# Patient Record
Sex: MALE | Race: WHITE | NOT HISPANIC OR LATINO | Employment: OTHER | URBAN - METROPOLITAN AREA
[De-identification: names, ages, dates, MRNs, and addresses within clinical notes are randomized per-mention and may not be internally consistent; named-entity substitution may affect disease eponyms.]

---

## 2017-01-13 PROBLEM — Z72.0 TOBACCO USE: Status: ACTIVE | Noted: 2017-01-13

## 2017-01-13 PROBLEM — I10 HYPERTENSION: Status: ACTIVE | Noted: 2017-01-13

## 2017-01-13 PROBLEM — F41.9 ANXIETY AND DEPRESSION: Status: ACTIVE | Noted: 2017-01-13

## 2017-01-13 PROBLEM — I25.10 CAD (CORONARY ARTERY DISEASE): Status: ACTIVE | Noted: 2017-01-13

## 2017-01-13 PROBLEM — K57.90 DIVERTICULOSIS: Status: ACTIVE | Noted: 2017-01-13

## 2017-01-13 PROBLEM — G62.9 NEUROPATHY: Status: ACTIVE | Noted: 2017-01-13

## 2017-01-13 PROBLEM — F32.A ANXIETY AND DEPRESSION: Status: ACTIVE | Noted: 2017-01-13

## 2017-01-13 PROBLEM — R00.1 SYMPTOMATIC BRADYCARDIA: Status: ACTIVE | Noted: 2017-01-13

## 2017-01-13 PROBLEM — E78.5 DYSLIPIDEMIA: Status: ACTIVE | Noted: 2017-01-13

## 2017-03-07 ENCOUNTER — OFFICE VISIT (OUTPATIENT)
Dept: CARDIOLOGY | Facility: CLINIC | Age: 62
End: 2017-03-07

## 2017-03-07 VITALS
WEIGHT: 272 LBS | DIASTOLIC BLOOD PRESSURE: 80 MMHG | HEIGHT: 70 IN | SYSTOLIC BLOOD PRESSURE: 115 MMHG | HEART RATE: 75 BPM | BODY MASS INDEX: 38.94 KG/M2

## 2017-03-07 DIAGNOSIS — R00.1 SYMPTOMATIC BRADYCARDIA: ICD-10-CM

## 2017-03-07 DIAGNOSIS — I10 ESSENTIAL HYPERTENSION: ICD-10-CM

## 2017-03-07 DIAGNOSIS — E78.5 DYSLIPIDEMIA: ICD-10-CM

## 2017-03-07 DIAGNOSIS — I25.10 CORONARY ARTERY DISEASE INVOLVING NATIVE CORONARY ARTERY OF NATIVE HEART WITHOUT ANGINA PECTORIS: Primary | ICD-10-CM

## 2017-03-07 PROCEDURE — 99212 OFFICE O/P EST SF 10 MIN: CPT | Performed by: INTERNAL MEDICINE

## 2017-03-07 PROCEDURE — 93288 INTERROG EVL PM/LDLS PM IP: CPT | Performed by: INTERNAL MEDICINE

## 2017-03-07 RX ORDER — ROPINIROLE 3 MG/1
3 TABLET, FILM COATED ORAL 2 TIMES DAILY
COMMUNITY

## 2017-03-07 RX ORDER — CETIRIZINE HYDROCHLORIDE 10 MG/1
10 TABLET ORAL DAILY
COMMUNITY

## 2017-03-07 RX ORDER — ASPIRIN 325 MG
325 TABLET ORAL DAILY
COMMUNITY

## 2017-03-07 RX ORDER — FOLIC ACID 1 MG/1
1 TABLET ORAL DAILY
COMMUNITY

## 2017-03-07 RX ORDER — LISINOPRIL 10 MG/1
10 TABLET ORAL DAILY
COMMUNITY
End: 2020-04-13

## 2017-03-07 RX ORDER — TRAZODONE HYDROCHLORIDE 150 MG/1
150 TABLET ORAL NIGHTLY
COMMUNITY
End: 2017-05-11 | Stop reason: SDUPTHER

## 2017-03-07 RX ORDER — HYDRALAZINE HYDROCHLORIDE 10 MG/1
20 TABLET, FILM COATED ORAL 3 TIMES DAILY
COMMUNITY
End: 2017-05-11 | Stop reason: DRUGHIGH

## 2017-03-07 RX ORDER — MONTELUKAST SODIUM 10 MG/1
10 TABLET ORAL NIGHTLY
COMMUNITY

## 2017-03-07 RX ORDER — PROMETHAZINE HYDROCHLORIDE 25 MG/1
25 TABLET ORAL EVERY 6 HOURS PRN
COMMUNITY
End: 2017-11-14

## 2017-03-07 RX ORDER — DILTIAZEM HYDROCHLORIDE 180 MG/1
360 CAPSULE, COATED, EXTENDED RELEASE ORAL DAILY
COMMUNITY
End: 2017-05-11 | Stop reason: DRUGHIGH
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
## 2017-04-07 ENCOUNTER — TELEPHONE (OUTPATIENT)
Dept: CARDIOLOGY | Facility: CLINIC | Age: 62
End: 2017-04-07

## 2017-04-07 NOTE — TELEPHONE ENCOUNTER
Frances called for clearance for colonoscopy. Reviewed with MRVICENTE, minor, nonobstructive CAD two years ago, normal LV function. No issues for colonoscopy. Clearance faxed. AYALA

## 2017-05-11 ENCOUNTER — OFFICE VISIT (OUTPATIENT)
Dept: PSYCHIATRY | Facility: CLINIC | Age: 62
End: 2017-05-11

## 2017-05-11 VITALS
HEIGHT: 70 IN | DIASTOLIC BLOOD PRESSURE: 69 MMHG | BODY MASS INDEX: 38.65 KG/M2 | SYSTOLIC BLOOD PRESSURE: 104 MMHG | HEART RATE: 75 BPM | WEIGHT: 270 LBS

## 2017-05-11 DIAGNOSIS — F33.40 MAJOR DEPRESSIVE DISORDER, RECURRENT, IN REMISSION (HCC): Primary | ICD-10-CM

## 2017-05-11 DIAGNOSIS — F10.21 ALCOHOL DEPENDENCE IN REMISSION (HCC): ICD-10-CM

## 2017-05-11 PROCEDURE — 99213 OFFICE O/P EST LOW 20 MIN: CPT | Performed by: PSYCHIATRY & NEUROLOGY

## 2017-05-11 RX ORDER — DILTIAZEM HYDROCHLORIDE 360 MG/1
360 CAPSULE, EXTENDED RELEASE ORAL DAILY
COMMUNITY
Start: 2017-05-02 | End: 2018-04-11 | Stop reason: DRUGHIGH

## 2017-05-11 RX ORDER — SERTRALINE HYDROCHLORIDE 100 MG/1
TABLET, FILM COATED ORAL
Qty: 138 TABLET | Refills: 1 | Status: SHIPPED | OUTPATIENT
Start: 2017-05-11 | End: 2017-07-11

## 2017-05-11 RX ORDER — HYDRALAZINE HYDROCHLORIDE 25 MG/1
TABLET, FILM COATED ORAL
COMMUNITY
Start: 2017-05-02 | End: 2017-07-11

## 2017-05-11 RX ORDER — AMILORIDE HYDROCHLORIDE 5 MG/1
5 TABLET ORAL DAILY
COMMUNITY
Start: 2017-05-02 | End: 2017-08-28

## 2017-05-11 RX ORDER — PRAVASTATIN SODIUM 40 MG
40 TABLET ORAL DAILY
COMMUNITY
Start: 2017-05-02

## 2017-05-11 RX ORDER — BUMETANIDE 1 MG/1
1 TABLET ORAL 2 TIMES DAILY
COMMUNITY
Start: 2017-05-02

## 2017-05-11 RX ORDER — METOLAZONE 2.5 MG/1
2.5 TABLET ORAL DAILY
COMMUNITY
End: 2017-07-11

## 2017-05-11 RX ORDER — SERTRALINE HYDROCHLORIDE 100 MG/1
TABLET, FILM COATED ORAL
COMMUNITY
Start: 2017-05-02 | End: 2017-05-11 | Stop reason: SDUPTHER

## 2017-05-11 RX ORDER — CYCLOBENZAPRINE HCL 10 MG
TABLET ORAL
COMMUNITY
Start: 2017-05-02 | End: 2017-05-11

## 2017-05-11 RX ORDER — TRAZODONE HYDROCHLORIDE 150 MG/1
150 TABLET ORAL NIGHTLY
Qty: 90 TABLET | Refills: 1 | Status: SHIPPED | OUTPATIENT
Start: 2017-05-11 | End: 2017-07-31 | Stop reason: SDUPTHER

## 2017-05-26 ENCOUNTER — TELEPHONE (OUTPATIENT)
Dept: CARDIOLOGY | Facility: CLINIC | Age: 62
End: 2017-05-26

## 2017-07-11 ENCOUNTER — OFFICE VISIT (OUTPATIENT)
Dept: CARDIOLOGY | Facility: CLINIC | Age: 62
End: 2017-07-11

## 2017-07-11 VITALS
DIASTOLIC BLOOD PRESSURE: 76 MMHG | WEIGHT: 272.2 LBS | BODY MASS INDEX: 38.97 KG/M2 | SYSTOLIC BLOOD PRESSURE: 115 MMHG | HEIGHT: 70 IN | HEART RATE: 76 BPM

## 2017-07-11 DIAGNOSIS — R00.2 PALPITATIONS: Primary | ICD-10-CM

## 2017-07-11 DIAGNOSIS — R00.1 SYMPTOMATIC BRADYCARDIA: ICD-10-CM

## 2017-07-11 PROCEDURE — 93280 PM DEVICE PROGR EVAL DUAL: CPT | Performed by: INTERNAL MEDICINE
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
## 2017-07-24 ENCOUNTER — TELEPHONE (OUTPATIENT)
Dept: CARDIOLOGY | Facility: CLINIC | Age: 62
End: 2017-07-24

## 2017-07-24 NOTE — TELEPHONE ENCOUNTER
"Spoke with patient and palpitations and weakness are \"no better\" since PPM changes made at last two visits with [facility]. Pt needs followup with EP to evaluate further. Chawla implanted in 2003 and Nicole did gen change in 2007. Message with Lore to schedule with EP ASAP. Pt aware. KH  "

## 2017-07-31 ENCOUNTER — OFFICE VISIT (OUTPATIENT)
Dept: PSYCHIATRY | Facility: CLINIC | Age: 62
End: 2017-07-31

## 2017-07-31 VITALS
WEIGHT: 273 LBS | BODY MASS INDEX: 39.08 KG/M2 | HEART RATE: 69 BPM | HEIGHT: 70 IN | SYSTOLIC BLOOD PRESSURE: 144 MMHG | DIASTOLIC BLOOD PRESSURE: 90 MMHG

## 2017-07-31 DIAGNOSIS — F33.1 MAJOR DEPRESSIVE DISORDER, RECURRENT EPISODE, MODERATE (HCC): Primary | ICD-10-CM

## 2017-07-31 DIAGNOSIS — F10.21 ALCOHOL DEPENDENCE IN REMISSION (HCC): ICD-10-CM

## 2017-07-31 PROCEDURE — 99213 OFFICE O/P EST LOW 20 MIN: CPT | Performed by: PSYCHIATRY & NEUROLOGY

## 2017-07-31 RX ORDER — HYDRALAZINE HYDROCHLORIDE 25 MG/1
TABLET, FILM COATED ORAL
COMMUNITY
Start: 2017-07-24 | End: 2017-08-28

## 2017-07-31 RX ORDER — EPLERENONE 50 MG/1
TABLET, FILM COATED ORAL
COMMUNITY
Start: 2017-07-24 | End: 2017-08-28

## 2017-07-31 RX ORDER — DULOXETIN HYDROCHLORIDE 60 MG/1
CAPSULE, DELAYED RELEASE ORAL
COMMUNITY
Start: 2017-07-24 | End: 2017-08-28

## 2017-07-31 RX ORDER — SERTRALINE HYDROCHLORIDE 100 MG/1
TABLET, FILM COATED ORAL
COMMUNITY
Start: 2017-07-24 | End: 2017-08-28

## 2017-07-31 RX ORDER — ALPRAZOLAM 0.5 MG/1
TABLET, EXTENDED RELEASE ORAL
Qty: 60 TABLET | Refills: 0 | Status: SHIPPED | OUTPATIENT
Start: 2017-07-31 | End: 2017-08-28

## 2017-07-31 RX ORDER — TRAZODONE HYDROCHLORIDE 150 MG/1
150 TABLET ORAL NIGHTLY
Qty: 90 TABLET | Refills: 0 | Status: SHIPPED | OUTPATIENT
Start: 2017-07-31 | End: 2017-09-14 | Stop reason: DRUGHIGH

## 2017-07-31 RX ORDER — TAMSULOSIN HYDROCHLORIDE 0.4 MG/1
CAPSULE ORAL
COMMUNITY
Start: 2017-07-24 | End: 2017-08-28

## 2017-07-31 RX ORDER — SPIRONOLACTONE 25 MG/1
TABLET ORAL
COMMUNITY
Start: 2017-07-24 | End: 2017-07-31 | Stop reason: ALTCHOICE

## 2017-07-31 NOTE — PROGRESS NOTES
"Patient ID: Cecilio Fierro is a 62 y.o. male    SERVICE TYPE: EVALUATION AND MANAGEMENT (greater than 50% of the time spent for supportive psychotherapy).      /90  Pulse 69  Ht 70\" (177.8 cm)  Wt 273 lb (124 kg)  BMI 39.17 kg/m2    ALLERGIES:  Review of patient's allergies indicates no known allergies.    CC: \"Nervous wreak, can't sleep\"    FOLLOWED FOR: Depression/alcohol dependency in remission    HPI: Severe back pain that radiates to his left leg 6-7 months, palpations (possible canidate for ablation), \"went back to smoking to calm myself\" Irritable, agitated, not feeling hopeless, no SI. Also having probably with hypokaliemia.   Very restless, anxious, today and distressed, the pain is severe in his back and left leg. .   \"Will never touch alcohol again\".     Rx'ed Cymbalta 60 mg daily per PCP Jose R for the past month, patient thinks it has helped his RLS (also Rx'ed Requip).   Will stop the Zoloft and Rx Alprazolam XR temporarily. ALYSSA done 7/28 negative.     PFSH: reports home life stable, watches his 16 month old Grandson. Also 12 and 8 y/o Grand children till school starts.     Review of Systems   Respiratory: Positive for shortness of breath.    Cardiovascular: Positive for palpitations.   Gastrointestinal: Negative.    Musculoskeletal: Positive for back pain.       SUPPORTIVE PSYCHOTHERAPY: continuing efforts to promote the therapeutic alliance, address the patient’s issues, and strengthen self awareness, insights, and coping skills.       Mental Status Exam  Appearance:  clean and casually dressed, appropriate  Attitude toward clinician:  cooperative and agreeable   Speech:    Rate:  regular rate and rhythm   Volume: normal  Motor:  no abnormal movements present  Mood:  Good  Affect:  euthymic  Thought Processes:  linear, logical, and goal directed  Thought Content:  Normal   Feeling Hopeless: absent  Suicidal Thoughts:  absent  Homicidal Thoughts:  absent  Perceptual Disturbance: no " perceptual disturbance  Attention and Concentration:  good  Insight and Judgement:  good  Memory:  memory appears to be intact    LABS:   Lab Results (last 7 days)     ** No results found for the last 168 hours. **          MEDICATION ISSUES: Have discussed with the patient the medications Risks, Benefits, and Side effects including potential falls, possible impaired driving and  metabolic adversities among others. No medication side effects or related complaints today.     TREATMENT PLAN/GOALS: Continue supportive psychotherapy efforts and medications as indicated.     Current Outpatient Prescriptions   Medication Sig Dispense Refill   • aMILoride (MIDAMOR) 5 MG tablet Take 5 mg by mouth Daily.     • aspirin 325 MG tablet Take 325 mg by mouth Daily.     • bumetanide (BUMEX) 1 MG tablet Take 1 mg by mouth Daily.     • cetirizine (zyrTEC) 10 MG tablet Take 10 mg by mouth Daily.     • diclofenac (VOLTAREN) 1 % gel gel      • diltiaZEM (TIAZAC) 360 MG 24 hr capsule Take 360 mg by mouth Daily.     • DULoxetine (CYMBALTA) 60 MG capsule      • eplerenone (INSPRA) 50 MG tablet      • folic acid (FOLVITE) 1 MG tablet Take 1 mg by mouth Daily.     • hydrALAZINE (APRESOLINE) 25 MG tablet      • lisinopril (PRINIVIL,ZESTRIL) 10 MG tablet Take 10 mg by mouth 2 (Two) Times a Day.     • metoprolol tartrate (LOPRESSOR) 100 MG tablet Take  by mouth 2 (Two) Times a Day.     • montelukast (SINGULAIR) 10 MG tablet Take 10 mg by mouth Every Night.     • omeprazole (priLOSEC) 40 MG capsule Take 40 mg by mouth Daily.     • potassium chloride (K-DUR,KLOR-CON) 20 MEQ CR tablet 20 mEq 2 (Two) Times a Day.     • pravastatin (PRAVACHOL) 40 MG tablet Take 40 mg by mouth Daily.     • promethazine (PHENERGAN) 25 MG tablet Take 25 mg by mouth Every 6 (Six) Hours As Needed for nausea or vomiting.     • raNITIdine (ZANTAC) 300 MG tablet Take 300 mg by mouth 2 (Two) Times a Day.     • rOPINIRole (REQUIP) 1 MG tablet Take 1 mg by mouth Every Night.  Take 1 hour before bedtime.     • sertraline (ZOLOFT) 100 MG tablet      • tamsulosin (FLOMAX) 0.4 MG capsule 24 hr capsule      • traZODone (DESYREL) 150 MG tablet Take 1 tablet by mouth Every Night. 90 tablet 0   • ALPRAZolam XR (XANAX XR) 0.5 MG 24 hr tablet Take one bid 60 tablet 0     No current facility-administered medications for this visit.        COLLATERAL PSYCHOTHERAPEUTIC INTERVENTION: patient not interested in additional psychotherapy.     Encounter Diagnoses   Name Primary?   • Major depressive disorder, recurrent episode, moderate Yes   • Alcohol dependence in remission        Return in about 4 weeks (around 8/28/2017).  Patient knows to call if symptoms worsen or fail to improve between appointments.

## 2017-08-01 RX ORDER — CLONAZEPAM 0.5 MG/1
0.5 TABLET ORAL 2 TIMES DAILY
Qty: 60 TABLET | Refills: 0 | OUTPATIENT
Start: 2017-08-01 | End: 2017-08-28

## 2017-08-28 ENCOUNTER — CONSULT (OUTPATIENT)
Dept: CARDIOLOGY | Facility: CLINIC | Age: 62
End: 2017-08-28

## 2017-08-28 VITALS
HEART RATE: 70 BPM | DIASTOLIC BLOOD PRESSURE: 72 MMHG | BODY MASS INDEX: 38.71 KG/M2 | WEIGHT: 270.4 LBS | HEIGHT: 70 IN | SYSTOLIC BLOOD PRESSURE: 118 MMHG

## 2017-08-28 DIAGNOSIS — R00.1 SYMPTOMATIC BRADYCARDIA: Primary | ICD-10-CM

## 2017-08-28 DIAGNOSIS — I10 ESSENTIAL HYPERTENSION: ICD-10-CM

## 2017-08-28 PROCEDURE — 99243 OFF/OP CNSLTJ NEW/EST LOW 30: CPT | Performed by: INTERNAL MEDICINE

## 2017-08-28 PROCEDURE — 93280 PM DEVICE PROGR EVAL DUAL: CPT | Performed by: INTERNAL MEDICINE
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
## 2017-08-29 RX ORDER — TRAZODONE HYDROCHLORIDE 150 MG/1
150 TABLET ORAL NIGHTLY
Qty: 90 TABLET | Refills: 0 | OUTPATIENT
Start: 2017-08-29

## 2017-08-30 ENCOUNTER — TELEPHONE (OUTPATIENT)
Dept: PSYCHIATRY | Facility: CLINIC | Age: 62
End: 2017-08-30

## 2017-08-30 NOTE — TELEPHONE ENCOUNTER
Patient is requesting refill on Klonopin 0.5mg #60 0 refills. It was last called into [facility] on 7/31/17

## 2017-09-14 ENCOUNTER — TELEPHONE (OUTPATIENT)
Dept: PSYCHIATRY | Facility: CLINIC | Age: 62
End: 2017-09-14

## 2017-09-14 ENCOUNTER — OFFICE VISIT (OUTPATIENT)
Dept: PSYCHIATRY | Facility: CLINIC | Age: 62
End: 2017-09-14

## 2017-09-14 VITALS
WEIGHT: 267.7 LBS | BODY MASS INDEX: 38.33 KG/M2 | DIASTOLIC BLOOD PRESSURE: 76 MMHG | SYSTOLIC BLOOD PRESSURE: 115 MMHG | HEIGHT: 70 IN | HEART RATE: 71 BPM

## 2017-09-14 DIAGNOSIS — F10.21 ALCOHOL DEPENDENCE IN REMISSION (HCC): ICD-10-CM

## 2017-09-14 DIAGNOSIS — F33.1 MAJOR DEPRESSIVE DISORDER, RECURRENT EPISODE, MODERATE (HCC): Primary | ICD-10-CM

## 2017-09-14 PROCEDURE — 99213 OFFICE O/P EST LOW 20 MIN: CPT | Performed by: PSYCHIATRY & NEUROLOGY

## 2017-09-14 RX ORDER — TRAZODONE HYDROCHLORIDE 100 MG/1
TABLET ORAL
Qty: 60 TABLET | Refills: 1 | Status: SHIPPED | OUTPATIENT
Start: 2017-09-14 | End: 2017-11-14 | Stop reason: SDUPTHER

## 2017-09-14 RX ORDER — LORAZEPAM 2 MG/1
TABLET ORAL
Qty: 60 TABLET | Refills: 1 | Status: SHIPPED | OUTPATIENT
Start: 2017-09-14 | End: 2017-11-14 | Stop reason: SDUPTHER

## 2017-09-14 RX ORDER — DULOXETIN HYDROCHLORIDE 60 MG/1
CAPSULE, DELAYED RELEASE ORAL
Qty: 60 CAPSULE | Refills: 1 | Status: SHIPPED | OUTPATIENT
Start: 2017-09-14 | End: 2017-11-14 | Stop reason: SDUPTHER

## 2017-09-14 NOTE — PROGRESS NOTES
"Patient ID: Cecilio Fierro is a 62 y.o. male    SERVICE TYPE: EVALUATION AND MANAGEMENT (greater than 50% of the time spent for supportive psychotherapy).      /76  Pulse 71  Ht 70\" (177.8 cm)  Wt 267 lb 11.2 oz (121 kg)  BMI 38.41 kg/m2    ALLERGIES:  Review of patient's allergies indicates no known allergies.    CC: \"The back and knee are killing me, also have bronchitis\"    FOLLOWED FOR: Depression/alcohol dependency in remission    HPI: Depression worse, not feeling hopeless, no SI/SP. \"Not his happy jolly self\": wife. Sleep interrupted averaging 3-4 hours per night. Weight down 6 lbs, \"no appetite\". Enjoyed his annual \"pig roast\" Labor Day week and projects, so his interest and activity are mostly normal.   ED concerns, chronic pain problems, all superimposed on cardiopulmonary medical issues.     PFSH: Home life stable, continues to enjoy being with his grandson.  Seen with his wife today who supportive.    Review of Systems   Respiratory: Positive for shortness of breath.    Cardiovascular: Positive for palpitations.   Musculoskeletal: Positive for back pain and joint swelling.   Slowly addressing the knee and back issues.     SUPPORTIVE PSYCHOTHERAPY: continuing efforts to promote the therapeutic alliance, address the patient’s issues, and strengthen self awareness, insights, and coping skills.       Mental Status Exam  Appearance:  clean and casually dressed, appropriate  Attitude toward clinician:  cooperative and agreeable   Speech:    Rate:  regular rate and rhythm   Volume: normal  Motor:  no abnormal movements present  Mood:  Depressed/discouraged  Affect:  Stoic as usual  Thought Processes:  linear, logical, and goal directed  Thought Content:  Normal   Feeling Hopeless: absent  Suicidal Thoughts:  absent  Homicidal Thoughts:  absent  Perceptual Disturbance: no perceptual disturbance  Attention and Concentration:  good  Insight and Judgement:  good  Memory:  memory appears to be " intact    LABS: No results found for this or any previous visit (from the past 168 hour(s)).    MEDICATION ISSUES: Have discussed with the patient the medications Risks, Benefits, and Side effects including potential falls, possible impaired driving and  metabolic adversities among others. No medication side effects or related complaints today.     TREATMENT PLAN/GOALS: Continue supportive psychotherapy efforts and medications as indicated.  Increase the Cymbalta to 60 mg twice a day, will try lorazepam 1-2 mg PRN twice a day reluctantly, increase the trazodone to 200 mg at bedtime.    Current Outpatient Prescriptions   Medication Sig Dispense Refill   • aspirin 325 MG tablet Take 325 mg by mouth Daily.     • bumetanide (BUMEX) 1 MG tablet Take 1 mg by mouth Daily.     • cetirizine (zyrTEC) 10 MG tablet Take 10 mg by mouth Daily.     • diltiaZEM (TIAZAC) 360 MG 24 hr capsule Take 360 mg by mouth Daily.     • folic acid (FOLVITE) 1 MG tablet Take 1 mg by mouth Daily.     • lisinopril (PRINIVIL,ZESTRIL) 10 MG tablet Take 10 mg by mouth 2 (Two) Times a Day.     • metoprolol tartrate (LOPRESSOR) 100 MG tablet Take 100 mg by mouth 2 (Two) Times a Day.     • montelukast (SINGULAIR) 10 MG tablet Take 10 mg by mouth Every Night.     • omeprazole (priLOSEC) 40 MG capsule Take 40 mg by mouth Daily.     • potassium chloride (K-DUR,KLOR-CON) 20 MEQ CR tablet Take 20 mEq by mouth 2 (Two) Times a Day.     • pravastatin (PRAVACHOL) 40 MG tablet Take 40 mg by mouth Daily.     • promethazine (PHENERGAN) 25 MG tablet Take 25 mg by mouth Every 6 (Six) Hours As Needed for nausea or vomiting.     • raNITIdine (ZANTAC) 300 MG tablet Take 300 mg by mouth 2 (Two) Times a Day.     • rOPINIRole (REQUIP) 1 MG tablet Take 1 mg by mouth Every Night. Take 1 hour before bedtime.     • DULoxetine (CYMBALTA) 60 MG capsule Take one bid. 60 capsule 1   • LORazepam (ATIVAN) 2 MG tablet One half or 1 twice a day when necessary anxiety 60 tablet 1   •  traZODone (DESYREL) 100 MG tablet Take two at hs 60 tablet 1     No current facility-administered medications for this visit.        COLLATERAL PSYCHOTHERAPEUTIC INTERVENTION: patient not interested in additional psychotherapy.     Encounter Diagnoses   Name Primary?   • Major depressive disorder, recurrent episode, moderate Yes   • Alcohol dependence in remission        Return in about 2 months (around 11/14/2017).  Patient knows to call if symptoms worsen or fail to improve between appointments.

## 2017-09-14 NOTE — TELEPHONE ENCOUNTER
Called in Lorazepam (ATIVAN) 2mg tablet #60   1 refill  Directions: take one half or 1 twice a day when necessary for anxiety

## 2017-11-13 RX ORDER — LORAZEPAM 2 MG/1
TABLET ORAL
Qty: 60 TABLET | Refills: 1 | Status: CANCELLED | OUTPATIENT
Start: 2017-11-13

## 2017-11-13 RX ORDER — TRAZODONE HYDROCHLORIDE 100 MG/1
TABLET ORAL
Qty: 60 TABLET | Refills: 1 | Status: CANCELLED | OUTPATIENT
Start: 2017-11-13

## 2017-11-13 RX ORDER — DULOXETIN HYDROCHLORIDE 60 MG/1
CAPSULE, DELAYED RELEASE ORAL
Qty: 60 CAPSULE | Refills: 1 | Status: CANCELLED | OUTPATIENT
Start: 2017-11-13

## 2017-11-14 ENCOUNTER — OFFICE VISIT (OUTPATIENT)
Dept: PSYCHIATRY | Facility: CLINIC | Age: 62
End: 2017-11-14

## 2017-11-14 ENCOUNTER — TELEPHONE (OUTPATIENT)
Dept: PSYCHIATRY | Facility: CLINIC | Age: 62
End: 2017-11-14

## 2017-11-14 VITALS
DIASTOLIC BLOOD PRESSURE: 78 MMHG | HEIGHT: 70 IN | HEART RATE: 79 BPM | SYSTOLIC BLOOD PRESSURE: 126 MMHG | BODY MASS INDEX: 37.94 KG/M2 | WEIGHT: 265 LBS

## 2017-11-14 DIAGNOSIS — F33.41 MDD (MAJOR DEPRESSIVE DISORDER), RECURRENT, IN PARTIAL REMISSION (HCC): Primary | ICD-10-CM

## 2017-11-14 DIAGNOSIS — F10.21 ALCOHOL DEPENDENCE IN REMISSION (HCC): ICD-10-CM

## 2017-11-14 PROCEDURE — 99213 OFFICE O/P EST LOW 20 MIN: CPT | Performed by: PSYCHIATRY & NEUROLOGY

## 2017-11-14 RX ORDER — HYDRALAZINE HYDROCHLORIDE 25 MG/1
25 TABLET, FILM COATED ORAL 3 TIMES DAILY
COMMUNITY
Start: 2017-11-13 | End: 2018-10-18 | Stop reason: ALTCHOICE

## 2017-11-14 RX ORDER — TAMSULOSIN HYDROCHLORIDE 0.4 MG/1
CAPSULE ORAL DAILY
COMMUNITY
Start: 2017-11-13 | End: 2017-11-14

## 2017-11-14 RX ORDER — EPLERENONE 50 MG/1
50 TABLET, FILM COATED ORAL DAILY
COMMUNITY
Start: 2017-11-13 | End: 2019-02-01

## 2017-11-14 RX ORDER — SPIRONOLACTONE 25 MG/1
TABLET ORAL DAILY
COMMUNITY
Start: 2017-11-13 | End: 2017-11-14 | Stop reason: ALTCHOICE

## 2017-11-14 RX ORDER — DULOXETIN HYDROCHLORIDE 60 MG/1
CAPSULE, DELAYED RELEASE ORAL
Qty: 60 CAPSULE | Refills: 2 | Status: SHIPPED | OUTPATIENT
Start: 2017-11-14 | End: 2018-01-11 | Stop reason: SDUPTHER

## 2017-11-14 RX ORDER — LORAZEPAM 2 MG/1
TABLET ORAL
Qty: 60 TABLET | Refills: 2 | Status: SHIPPED | OUTPATIENT
Start: 2017-11-14 | End: 2018-01-11 | Stop reason: SDUPTHER

## 2017-11-14 RX ORDER — AMILORIDE HYDROCHLORIDE 5 MG/1
5 TABLET ORAL DAILY
COMMUNITY
Start: 2017-11-13 | End: 2019-02-01

## 2017-11-14 RX ORDER — TRAZODONE HYDROCHLORIDE 100 MG/1
TABLET ORAL
Qty: 60 TABLET | Refills: 2 | Status: SHIPPED | OUTPATIENT
Start: 2017-11-14 | End: 2018-01-11 | Stop reason: SDUPTHER

## 2017-11-14 RX ORDER — NITROGLYCERIN 0.4 MG/1
0.4 TABLET SUBLINGUAL
COMMUNITY
Start: 2017-11-13

## 2017-11-14 NOTE — PROGRESS NOTES
"Patient ID: Cecilio Fierro is a 62 y.o. male    SERVICE TYPE: EVALUATION AND MANAGEMENT (greater than 50% of the time spent for supportive psychotherapy).      /78  Pulse 79  Ht 70\" (177.8 cm)  Wt 265 lb (120 kg)  BMI 38.02 kg/m2    ALLERGIES:  Review of patient's allergies indicates no known allergies.    CC: \"So, so, problem with my back and legs\"     FOLLOWED FOR: Depression/ ETOH.     HPI: \"agravated can't do nothing, not sleeping\". Rates his depression @ 6/10, no hopelessness or suicidal thoughts or plains. \"Too much to live for for that\". Interest and activities primarily limited by his physical status.   No ETOH.   Adding Rx for Suvorenxant (Belsomra) 10 mg at hs.     PFSH: continues to enjoy the 18 month grandson who is being raised by the child. The mother is pregnant again. Best friend with recent CVA, terminally ill.     Review of Systems   Respiratory: Positive for shortness of breath.    Cardiovascular: Positive for palpitations.   Gastrointestinal: Positive for diarrhea.   Musculoskeletal: Positive for back pain.    Schedule for a spinal tap and dye study next week.  Pain and paresthesias in both lower extremities    SUPPORTIVE PSYCHOTHERAPY: continuing efforts to promote the therapeutic alliance, address the patient’s issues, and strengthen self awareness, insights, and coping skills.       Mental Status Exam  Appearance:  clean and casually dressed, appropriate  Attitude toward clinician:  cooperative and agreeable   Speech:    Rate:  regular rate and rhythm   Volume: normal  Motor:  no abnormal movements present  Mood:  Mildly depressed, discouraged.   Affect:  euthymic  Thought Processes:  linear, logical, and goal directed  Thought Content:  Normal   Feeling Hopeless: absent  Suicidal Thoughts:  absent  Homicidal Thoughts:  absent  Perceptual Disturbance: no perceptual disturbance  Attention and Concentration:  good  Insight and Judgement:  good  Memory:  memory appears to be " intact    LABS: No results found for this or any previous visit (from the past 168 hour(s)).    MEDICATION ISSUES: Have discussed with the patient the medications Risks, Benefits, and Side effects including potential falls, possible impaired driving and  metabolic adversities among others. No medication side effects or related complaints today.     TREATMENT PLAN/GOALS: Continue supportive psychotherapy efforts and medications as indicated.     Current Outpatient Prescriptions   Medication Sig Dispense Refill   • aMILoride (MIDAMOR) 5 MG tablet 2 (Two) Times a Day.     • aspirin 325 MG tablet Take 325 mg by mouth Daily.     • bumetanide (BUMEX) 1 MG tablet Take 1 mg by mouth Daily.     • cetirizine (zyrTEC) 10 MG tablet Take 10 mg by mouth Daily.     • diltiaZEM (TIAZAC) 360 MG 24 hr capsule Take 360 mg by mouth Daily.     • DULoxetine (CYMBALTA) 60 MG capsule Take one bid. 60 capsule 2   • eplerenone (INSPRA) 50 MG tablet Daily.     • folic acid (FOLVITE) 1 MG tablet Take 1 mg by mouth Daily.     • hydrALAZINE (APRESOLINE) 25 MG tablet 3 (Three) Times a Day.     • lisinopril (PRINIVIL,ZESTRIL) 10 MG tablet Take 10 mg by mouth 2 (Two) Times a Day.     • LORazepam (ATIVAN) 2 MG tablet One half or 1 twice a day when necessary anxiety 60 tablet 2   • metoprolol tartrate (LOPRESSOR) 100 MG tablet Take 100 mg by mouth 2 (Two) Times a Day.     • montelukast (SINGULAIR) 10 MG tablet Take 10 mg by mouth Every Night.     • nitroglycerin (NITROSTAT) 0.4 MG SL tablet      • omeprazole (priLOSEC) 40 MG capsule Take 40 mg by mouth Daily.     • potassium chloride (K-DUR,KLOR-CON) 20 MEQ CR tablet Take 20 mEq by mouth 2 (Two) Times a Day.     • pravastatin (PRAVACHOL) 40 MG tablet Take 40 mg by mouth Daily.     • raNITIdine (ZANTAC) 300 MG tablet Take 300 mg by mouth 2 (Two) Times a Day.     • rOPINIRole (REQUIP) 1 MG tablet Take 1 mg by mouth Every Night. Take 1 hour before bedtime.     • traZODone (DESYREL) 100 MG tablet Take  two at hs 60 tablet 2     No current facility-administered medications for this visit.        COLLATERAL PSYCHOTHERAPEUTIC INTERVENTION: patient not interested in additional psychotherapy.     Encounter Diagnoses   Name Primary?   • MDD (major depressive disorder), recurrent, in partial remission Yes   • Alcohol dependence in remission        Return in about 3 months (around 2/14/2018).  Patient knows to call if symptoms worsen or fail to improve between appointments.

## 2017-11-17 ENCOUNTER — TELEPHONE (OUTPATIENT)
Dept: PSYCHIATRY | Facility: CLINIC | Age: 62
End: 2017-11-17

## 2017-11-17 RX ORDER — ESZOPICLONE 2 MG/1
2 TABLET, FILM COATED ORAL NIGHTLY PRN
Qty: 30 TABLET | Refills: 0 | Status: SHIPPED | OUTPATIENT
Start: 2017-11-17 | End: 2017-12-11

## 2017-11-17 NOTE — TELEPHONE ENCOUNTER
[employer] has denied the medication Belsomra 10 MG. Is there something else you can give him? I will scan in his denial paper in his chart.

## 2017-11-17 NOTE — TELEPHONE ENCOUNTER
Advised patient he could increase the trazodone to 1-1/2 (150 mg) at bedtime, call if and when he needs additional prescription.

## 2017-11-20 ENCOUNTER — TELEPHONE (OUTPATIENT)
Dept: PSYCHIATRY | Facility: CLINIC | Age: 62
End: 2017-11-20

## 2017-11-21 ENCOUNTER — TELEPHONE (OUTPATIENT)
Dept: PSYCHIATRY | Facility: CLINIC | Age: 62
End: 2017-11-21

## 2017-11-21 RX ORDER — DOXEPIN HYDROCHLORIDE 25 MG/1
25 CAPSULE ORAL NIGHTLY
Qty: 30 CAPSULE | Refills: 2 | Status: SHIPPED | OUTPATIENT
Start: 2017-11-21 | End: 2018-01-11 | Stop reason: SDUPTHER

## 2017-12-11 ENCOUNTER — OFFICE VISIT (OUTPATIENT)
Dept: CARDIOLOGY | Facility: CLINIC | Age: 62
End: 2017-12-11

## 2017-12-11 VITALS
DIASTOLIC BLOOD PRESSURE: 88 MMHG | HEART RATE: 70 BPM | BODY MASS INDEX: 37.57 KG/M2 | WEIGHT: 268.4 LBS | HEIGHT: 71 IN | SYSTOLIC BLOOD PRESSURE: 130 MMHG

## 2017-12-11 DIAGNOSIS — F32.A ANXIETY AND DEPRESSION: ICD-10-CM

## 2017-12-11 DIAGNOSIS — F41.9 ANXIETY AND DEPRESSION: ICD-10-CM

## 2017-12-11 DIAGNOSIS — Z95.0 PACEMAKER: ICD-10-CM

## 2017-12-11 DIAGNOSIS — I25.10 CORONARY ARTERY DISEASE INVOLVING NATIVE CORONARY ARTERY OF NATIVE HEART WITHOUT ANGINA PECTORIS: Primary | ICD-10-CM

## 2017-12-11 DIAGNOSIS — R00.1 SYMPTOMATIC BRADYCARDIA: ICD-10-CM

## 2017-12-11 DIAGNOSIS — M54.40 LOW BACK PAIN WITH SCIATICA, SCIATICA LATERALITY UNSPECIFIED, UNSPECIFIED BACK PAIN LATERALITY, UNSPECIFIED CHRONICITY: ICD-10-CM

## 2017-12-11 PROBLEM — M54.9 BACK PAIN: Status: ACTIVE | Noted: 2017-12-11

## 2017-12-11 PROCEDURE — 99213 OFFICE O/P EST LOW 20 MIN: CPT | Performed by: INTERNAL MEDICINE

## 2017-12-11 PROCEDURE — 93280 PM DEVICE PROGR EVAL DUAL: CPT | Performed by: INTERNAL MEDICINE
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
## 2018-01-08 ENCOUNTER — TELEPHONE (OUTPATIENT)
Dept: CARDIOLOGY | Facility: CLINIC | Age: 63
End: 2018-01-08

## 2018-01-08 NOTE — TELEPHONE ENCOUNTER
Patient has still been having palpitations with chest pain. He needs to have a myelogram on his back Friday. He wants to know if you think this is safe. He said that he was having these same symptoms on his last visit.

## 2018-01-08 NOTE — TELEPHONE ENCOUNTER
I tried to call the patient back. No answer. I left a message to let him know that it is ok with Dr. Huizar for him to have the myelogram on Friday.(He said that he had minor nonobstructive CAD at the time of his last heart cath and there has been no change in symptoms from his last visit).

## 2018-01-11 ENCOUNTER — OFFICE VISIT (OUTPATIENT)
Dept: PSYCHIATRY | Facility: CLINIC | Age: 63
End: 2018-01-11

## 2018-01-11 VITALS
WEIGHT: 278 LBS | BODY MASS INDEX: 38.92 KG/M2 | HEART RATE: 78 BPM | SYSTOLIC BLOOD PRESSURE: 134 MMHG | DIASTOLIC BLOOD PRESSURE: 87 MMHG | HEIGHT: 71 IN

## 2018-01-11 DIAGNOSIS — F10.21 ALCOHOL DEPENDENCE IN REMISSION (HCC): ICD-10-CM

## 2018-01-11 DIAGNOSIS — F33.1 MAJOR DEPRESSIVE DISORDER, RECURRENT EPISODE, MODERATE (HCC): Primary | ICD-10-CM

## 2018-01-11 PROCEDURE — 99214 OFFICE O/P EST MOD 30 MIN: CPT | Performed by: PSYCHIATRY & NEUROLOGY

## 2018-01-11 RX ORDER — TRAZODONE HYDROCHLORIDE 100 MG/1
TABLET ORAL
Qty: 60 TABLET | Refills: 2 | Status: SHIPPED | OUTPATIENT
Start: 2018-01-11 | End: 2018-04-11 | Stop reason: SDUPTHER

## 2018-01-11 RX ORDER — DULOXETIN HYDROCHLORIDE 60 MG/1
CAPSULE, DELAYED RELEASE ORAL
Qty: 60 CAPSULE | Refills: 2 | Status: SHIPPED | OUTPATIENT
Start: 2018-01-11 | End: 2018-04-11 | Stop reason: SDUPTHER

## 2018-01-11 RX ORDER — LORAZEPAM 2 MG/1
TABLET ORAL
Qty: 60 TABLET | Refills: 2 | Status: SHIPPED | OUTPATIENT
Start: 2018-01-11 | End: 2018-04-11 | Stop reason: SDUPTHER

## 2018-01-11 RX ORDER — DOXEPIN HYDROCHLORIDE 25 MG/1
25 CAPSULE ORAL NIGHTLY
Qty: 30 CAPSULE | Refills: 2 | Status: SHIPPED | OUTPATIENT
Start: 2018-01-11 | End: 2018-04-11

## 2018-01-11 RX ORDER — ARIPIPRAZOLE 2 MG/1
2 TABLET ORAL DAILY
Qty: 30 TABLET | Refills: 2 | Status: SHIPPED | OUTPATIENT
Start: 2018-01-11 | End: 2018-02-13

## 2018-01-11 NOTE — PROGRESS NOTES
"Patient ID: Cecilio Fierro is a 62 y.o. male    SERVICE TYPE: EVALUATION AND MANAGEMENT (greater than 50% of the time spent for supportive psychotherapy).  Time in: 1035   Time out: 1105    /87  Pulse 78  Ht 180 cm (70.87\")  Wt 126 kg (278 lb)  BMI 38.92 kg/m2    ALLERGIES:  Review of patient's allergies indicates no known allergies.    CC: \"Nor too good a nervous wreck\"     FOLLOWED FOR: Depression/ chronic pain.     HPI: \"Shaking all over, can't breath, ear and sinus infection\". Scheduled tomorrow for mylogram of Lower back, chest pain, scheduled for pacemaker change. Pain complaints: knee, back. Hot and cold spills.   Depressed, not feeling hopeless, \"just aggravated and nervous/ restless, \"not worried about nothing. NO DRINKING. Up at 4-5 AM, naps occasionally during the day. No SI. Certainly discouraged with his multiple, serious medical problems.    Will add low dose Abilify to his medication as a adjunct to the Cymbalta. Is also on low dose Doxepin at ha.     Wife on alert with nature of patient's situation and depression with its suicidal risk.     PFSH: Tends to 21 month old Great grandson 11 hours/day. Wife supportive. Patient stays busy as best he can with his hobbies. Lost his best friend in November.     Review of Systems   Respiratory: Positive for chest tightness.    Cardiovascular: Positive for chest pain.   Gastrointestinal: Negative.    Genitourinary: Positive for frequency.   Musculoskeletal: Positive for arthralgias and back pain.   Neurological: Positive for headaches.       SUPPORTIVE PSYCHOTHERAPY: continuing efforts to promote the therapeutic alliance, address the patient’s issues, and strengthen self awareness, insights, and coping skills.       Mental Status Exam  Appearance:  clean and casually dressed, appropriate  Attitude toward clinician:  cooperative and agreeable   Speech:    Rate:  regular rate and rhythm   Volume: normal  Motor:  no abnormal movements present  Mood:  " Good  Affect:  euthymic  Thought Processes:  linear, logical, and goal directed  Thought Content:  Normal   Feeling Hopeless: absent  Suicidal Thoughts:  absent  Homicidal Thoughts:  absent  Perceptual Disturbance: no perceptual disturbance  Attention and Concentration:  good  Insight and Judgement:  good  Memory:  memory appears to be intact    LABS: No results found for this or any previous visit (from the past 168 hour(s)).    MEDICATION ISSUES: Have discussed with the patient the medications Risks, Benefits, and Side effects including potential falls, possible impaired driving and  metabolic adversities among others. No medication side effects or related complaints today.     TREATMENT PLAN/GOALS: Continue supportive psychotherapy efforts and medications as indicated.     Current Outpatient Prescriptions   Medication Sig Dispense Refill   • aMILoride (MIDAMOR) 5 MG tablet 5 mg Daily.     • aspirin 325 MG tablet Take 325 mg by mouth Daily.     • bumetanide (BUMEX) 1 MG tablet Take 1 mg by mouth Daily.     • cetirizine (zyrTEC) 10 MG tablet Take 10 mg by mouth Daily.     • diltiaZEM (TIAZAC) 360 MG 24 hr capsule Take 360 mg by mouth Daily.     • doxepin (SINEquan) 25 MG capsule Take 1 capsule by mouth Every Night. 30 capsule 2   • DULoxetine (CYMBALTA) 60 MG capsule Take one bid. 60 capsule 2   • eplerenone (INSPRA) 50 MG tablet Daily.     • folic acid (FOLVITE) 1 MG tablet Take 1 mg by mouth Daily.     • hydrALAZINE (APRESOLINE) 25 MG tablet 3 (Three) Times a Day.     • lisinopril (PRINIVIL,ZESTRIL) 10 MG tablet Take 10 mg by mouth 2 (Two) Times a Day.     • LORazepam (ATIVAN) 2 MG tablet One half or 1 twice a day when necessary anxiety 60 tablet 2   • metoprolol tartrate (LOPRESSOR) 100 MG tablet Take 100 mg by mouth 2 (Two) Times a Day.     • montelukast (SINGULAIR) 10 MG tablet Take 10 mg by mouth Every Night.     • nitroglycerin (NITROSTAT) 0.4 MG SL tablet      • omeprazole (priLOSEC) 40 MG capsule Take 40  mg by mouth Daily.     • potassium chloride (K-DUR,KLOR-CON) 20 MEQ CR tablet Take 20 mEq by mouth 2 (Two) Times a Day.     • pravastatin (PRAVACHOL) 40 MG tablet Take 40 mg by mouth Daily.     • raNITIdine (ZANTAC) 300 MG tablet Take 300 mg by mouth 2 (Two) Times a Day.     • rOPINIRole (REQUIP) 1 MG tablet Take 1 mg by mouth Every Night. Take 1 hour before bedtime.     • traZODone (DESYREL) 100 MG tablet Take two at hs 60 tablet 2   • ARIPiprazole (ABILIFY) 2 MG tablet Take 1 tablet by mouth Daily. 30 tablet 2     No current facility-administered medications for this visit.        COLLATERAL PSYCHOTHERAPEUTIC INTERVENTION: patient not interested in additional psychotherapy.     Encounter Diagnoses   Name Primary?   • Major depressive disorder, recurrent episode, moderate Yes   • Alcohol dependence in remission        Return in about 3 months (around 4/11/2018).  Patient knows to call if symptoms worsen or fail to improve between appointments.

## 2018-01-12 ENCOUNTER — TRANSCRIBE ORDERS (OUTPATIENT)
Dept: ADMINISTRATIVE | Facility: HOSPITAL | Age: 63
End: 2018-01-12

## 2018-01-12 DIAGNOSIS — F17.210 CIGARETTE SMOKER: ICD-10-CM

## 2018-01-12 DIAGNOSIS — Z80.1 FAMILY HISTORY OF MALIGNANT NEOPLASM OF TRACHEA, BRONCHUS, AND LUNG: Primary | ICD-10-CM

## 2018-01-26 ENCOUNTER — HOSPITAL ENCOUNTER (OUTPATIENT)
Dept: CT IMAGING | Facility: HOSPITAL | Age: 63
Discharge: HOME | End: 2018-01-26
Admitting: FAMILY MEDICINE

## 2018-01-26 DIAGNOSIS — Z80.1 FAMILY HISTORY OF MALIGNANT NEOPLASM OF TRACHEA, BRONCHUS, AND LUNG: ICD-10-CM

## 2018-01-26 DIAGNOSIS — F17.210 CIGARETTE SMOKER: ICD-10-CM

## 2018-01-26 PROCEDURE — G0297 LDCT FOR LUNG CA SCREEN: HCPCS

## 2018-01-26 PROCEDURE — 71250 CT THORAX DX C-: CPT | Performed by: RADIOLOGY

## 2018-02-08 ENCOUNTER — PREP FOR SURGERY (OUTPATIENT)
Dept: OTHER | Facility: HOSPITAL | Age: 63
End: 2018-02-08

## 2018-02-08 DIAGNOSIS — R00.1 SYMPTOMATIC BRADYCARDIA: Primary | ICD-10-CM

## 2018-02-08 RX ORDER — PROMETHAZINE HYDROCHLORIDE 25 MG/ML
12.5 INJECTION, SOLUTION INTRAMUSCULAR; INTRAVENOUS EVERY 4 HOURS PRN
Status: CANCELLED | OUTPATIENT
Start: 2018-02-08

## 2018-02-08 RX ORDER — NITROGLYCERIN 0.4 MG/1
0.4 TABLET SUBLINGUAL
Status: CANCELLED | OUTPATIENT
Start: 2018-02-08

## 2018-02-08 RX ORDER — SODIUM CHLORIDE 0.9 % (FLUSH) 0.9 %
1-10 SYRINGE (ML) INJECTION AS NEEDED
Status: CANCELLED | OUTPATIENT
Start: 2018-02-08

## 2018-02-08 RX ORDER — ACETAMINOPHEN 325 MG/1
650 TABLET ORAL EVERY 4 HOURS PRN
Status: CANCELLED | OUTPATIENT
Start: 2018-02-08

## 2018-02-13 ENCOUNTER — OFFICE VISIT (OUTPATIENT)
Dept: CARDIOLOGY | Facility: CLINIC | Age: 63
End: 2018-02-13

## 2018-02-13 VITALS
HEIGHT: 71 IN | HEART RATE: 66 BPM | WEIGHT: 284.2 LBS | BODY MASS INDEX: 39.79 KG/M2 | SYSTOLIC BLOOD PRESSURE: 120 MMHG | DIASTOLIC BLOOD PRESSURE: 79 MMHG

## 2018-02-13 DIAGNOSIS — I10 ESSENTIAL HYPERTENSION: ICD-10-CM

## 2018-02-13 DIAGNOSIS — I25.10 CORONARY ARTERY DISEASE INVOLVING NATIVE CORONARY ARTERY OF NATIVE HEART WITHOUT ANGINA PECTORIS: Primary | ICD-10-CM

## 2018-02-13 PROCEDURE — 99212 OFFICE O/P EST SF 10 MIN: CPT | Performed by: INTERNAL MEDICINE
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
## 2018-02-20 ENCOUNTER — APPOINTMENT (OUTPATIENT)
Dept: PREADMISSION TESTING | Facility: HOSPITAL | Age: 63
End: 2018-02-20

## 2018-02-20 DIAGNOSIS — R00.1 SYMPTOMATIC BRADYCARDIA: ICD-10-CM

## 2018-02-20 LAB
ANION GAP SERPL CALCULATED.3IONS-SCNC: 7 MMOL/L (ref 3–11)
BUN BLD-MCNC: 12 MG/DL (ref 9–23)
BUN/CREAT SERPL: 12 (ref 7–25)
CALCIUM SPEC-SCNC: 9 MG/DL (ref 8.7–10.4)
CHLORIDE SERPL-SCNC: 110 MMOL/L (ref 99–109)
CO2 SERPL-SCNC: 25 MMOL/L (ref 20–31)
CREAT BLD-MCNC: 1 MG/DL (ref 0.6–1.3)
DEPRECATED RDW RBC AUTO: 50.5 FL (ref 37–54)
ERYTHROCYTE [DISTWIDTH] IN BLOOD BY AUTOMATED COUNT: 14.9 % (ref 11.3–14.5)
GFR SERPL CREATININE-BSD FRML MDRD: 76 ML/MIN/1.73
GLUCOSE BLD-MCNC: 92 MG/DL (ref 70–100)
HBA1C MFR BLD: 5.8 % (ref 4.8–5.6)
HCT VFR BLD AUTO: 37.3 % (ref 38.9–50.9)
HGB BLD-MCNC: 12.4 G/DL (ref 13.1–17.5)
INR PPP: 0.9 (ref 0.91–1.09)
MCH RBC QN AUTO: 30.8 PG (ref 27–31)
MCHC RBC AUTO-ENTMCNC: 33.2 G/DL (ref 32–36)
MCV RBC AUTO: 92.8 FL (ref 80–99)
PLATELET # BLD AUTO: 221 10*3/MM3 (ref 150–450)
PMV BLD AUTO: 8.9 FL (ref 6–12)
POTASSIUM BLD-SCNC: 3.9 MMOL/L (ref 3.5–5.5)
PROTHROMBIN TIME: 9.4 SECONDS (ref 9.6–11.5)
RBC # BLD AUTO: 4.02 10*6/MM3 (ref 4.2–5.76)
SODIUM BLD-SCNC: 142 MMOL/L (ref 132–146)
WBC NRBC COR # BLD: 8.93 10*3/MM3 (ref 3.5–10.8)

## 2018-02-20 PROCEDURE — 85610 PROTHROMBIN TIME: CPT | Performed by: NURSE PRACTITIONER

## 2018-02-20 PROCEDURE — 85027 COMPLETE CBC AUTOMATED: CPT | Performed by: NURSE PRACTITIONER

## 2018-02-20 PROCEDURE — 83036 HEMOGLOBIN GLYCOSYLATED A1C: CPT | Performed by: NURSE PRACTITIONER

## 2018-02-20 PROCEDURE — 80048 BASIC METABOLIC PNL TOTAL CA: CPT | Performed by: NURSE PRACTITIONER

## 2018-02-20 PROCEDURE — 36415 COLL VENOUS BLD VENIPUNCTURE: CPT

## 2018-02-20 RX ORDER — BENZONATATE 200 MG/1
200 CAPSULE ORAL 3 TIMES DAILY PRN
COMMUNITY
End: 2020-06-09

## 2018-02-20 RX ORDER — DICYCLOMINE HYDROCHLORIDE 10 MG/1
10 CAPSULE ORAL 3 TIMES DAILY PRN
COMMUNITY
End: 2020-04-13

## 2018-02-20 RX ORDER — PROMETHAZINE HYDROCHLORIDE 25 MG/1
25 TABLET ORAL AS NEEDED
COMMUNITY

## 2018-02-20 RX ORDER — SERTRALINE HYDROCHLORIDE 100 MG/1
150 TABLET, FILM COATED ORAL DAILY
COMMUNITY
End: 2018-04-11

## 2018-02-20 NOTE — DISCHARGE INSTRUCTIONS
"Dear Patient,    Do NOT eat or drink anything after midnight except for sips of water with your AM prescription medications unless otherwise instructed by your physician.    Do NOT smoke after midnight the night before your procedure.    Glasses and jewelry may be worn, but dentures must be removed prior to your procedure.    Leave any items you consider valuable at home.      MORNING of your Procedure, please bring the following:     -Photo ID and insurance card(s)    -ALL medications in their ORIGINAL CONTAINERS    -Co-pay and/or deductible required by your insurance   -Copy of living will or power of  document (if not brought to    Pre-Admission Testing department)   -CPAP mask and tubing, not your machine (if applicable)   -Skin Prep Instruction Sheet (if applicable)    Family members may wait in [facility] waiting area during procedure.    Need to make arrangements for transportation prior to discharge.    A handout regarding \"Heart Healthy Eating\" was provided today to encourage healthy eating habits.    Booklet published by [employer] was given in Pre-Admission testing.  This booklet is for informational purposes only.  If you have any questions about your procedure, please speak with your physician.    "

## 2018-02-21 ENCOUNTER — HOSPITAL ENCOUNTER (OUTPATIENT)
Facility: HOSPITAL | Age: 63
Discharge: HOME | End: 2018-02-21
Attending: INTERNAL MEDICINE | Admitting: INTERNAL MEDICINE

## 2018-02-21 VITALS
BODY MASS INDEX: 39.26 KG/M2 | OXYGEN SATURATION: 95 % | WEIGHT: 280.43 LBS | TEMPERATURE: 97.6 F | RESPIRATION RATE: 13 BRPM | DIASTOLIC BLOOD PRESSURE: 87 MMHG | SYSTOLIC BLOOD PRESSURE: 128 MMHG | HEIGHT: 71 IN | HEART RATE: 69 BPM

## 2018-02-21 DIAGNOSIS — R00.1 SYMPTOMATIC BRADYCARDIA: ICD-10-CM

## 2018-02-21 DIAGNOSIS — Z95.0 PACEMAKER: ICD-10-CM

## 2018-02-21 PROCEDURE — 33228 REMV&REPLC PM GEN DUAL LEAD: CPT | Performed by: INTERNAL MEDICINE

## 2018-02-21 PROCEDURE — 25010000002 FENTANYL CITRATE (PF) 100 MCG/2ML SOLUTION: Performed by: INTERNAL MEDICINE

## 2018-02-21 PROCEDURE — 25010000002 DIPHENHYDRAMINE PER 50 MG: Performed by: INTERNAL MEDICINE

## 2018-02-21 PROCEDURE — 94660 CPAP INITIATION&MGMT: CPT

## 2018-02-21 PROCEDURE — 94799 UNLISTED PULMONARY SVC/PX: CPT

## 2018-02-21 PROCEDURE — 25010000002 VANCOMYCIN HCL IN NACL 2-0.9 GM/500ML-% SOLUTION: Performed by: NURSE PRACTITIONER

## 2018-02-21 PROCEDURE — 25010000002 MIDAZOLAM PER 1 MG: Performed by: INTERNAL MEDICINE

## 2018-02-21 PROCEDURE — C1785 PMKR, DUAL, RATE-RESP: HCPCS | Performed by: INTERNAL MEDICINE

## 2018-02-21 PROCEDURE — 25010000002 PROMETHAZINE PER 50 MG: Performed by: INTERNAL MEDICINE

## 2018-02-21 DEVICE — GEN PM ASSURITY DR RF PM2240 MERLIN: Type: IMPLANTABLE DEVICE | Site: CHEST WALL | Status: FUNCTIONAL

## 2018-02-21 RX ORDER — SODIUM CHLORIDE 0.9 % (FLUSH) 0.9 %
1-10 SYRINGE (ML) INJECTION AS NEEDED
Status: DISCONTINUED | OUTPATIENT
Start: 2018-02-21 | End: 2018-02-21 | Stop reason: HOSPADM

## 2018-02-21 RX ORDER — VANCOMYCIN 2 GRAM/500 ML IN 0.9 % SODIUM CHLORIDE INTRAVENOUS
2000
Status: COMPLETED | OUTPATIENT
Start: 2018-02-21 | End: 2018-02-21

## 2018-02-21 RX ORDER — PROMETHAZINE HYDROCHLORIDE 25 MG/ML
12.5 INJECTION, SOLUTION INTRAMUSCULAR; INTRAVENOUS EVERY 4 HOURS PRN
Status: DISCONTINUED | OUTPATIENT
Start: 2018-02-21 | End: 2018-02-21 | Stop reason: HOSPADM

## 2018-02-21 RX ORDER — MIDAZOLAM HYDROCHLORIDE 1 MG/ML
INJECTION INTRAMUSCULAR; INTRAVENOUS AS NEEDED
Status: DISCONTINUED | OUTPATIENT
Start: 2018-02-21 | End: 2018-02-21 | Stop reason: HOSPADM

## 2018-02-21 RX ORDER — SODIUM CHLORIDE 9 MG/ML
INJECTION, SOLUTION INTRAVENOUS CONTINUOUS PRN
Status: DISCONTINUED | OUTPATIENT
Start: 2018-02-21 | End: 2018-02-21 | Stop reason: HOSPADM

## 2018-02-21 RX ORDER — ACETAMINOPHEN 325 MG/1
650 TABLET ORAL EVERY 4 HOURS PRN
Status: DISCONTINUED | OUTPATIENT
Start: 2018-02-21 | End: 2018-02-21 | Stop reason: HOSPADM

## 2018-02-21 RX ORDER — DIPHENHYDRAMINE HYDROCHLORIDE 50 MG/ML
INJECTION INTRAMUSCULAR; INTRAVENOUS AS NEEDED
Status: DISCONTINUED | OUTPATIENT
Start: 2018-02-21 | End: 2018-02-21 | Stop reason: HOSPADM

## 2018-02-21 RX ORDER — PROMETHAZINE HYDROCHLORIDE 25 MG/ML
INJECTION, SOLUTION INTRAMUSCULAR; INTRAVENOUS AS NEEDED
Status: DISCONTINUED | OUTPATIENT
Start: 2018-02-21 | End: 2018-02-21 | Stop reason: HOSPADM

## 2018-02-21 RX ORDER — FENTANYL CITRATE 50 UG/ML
INJECTION, SOLUTION INTRAMUSCULAR; INTRAVENOUS AS NEEDED
Status: DISCONTINUED | OUTPATIENT
Start: 2018-02-21 | End: 2018-02-21 | Stop reason: HOSPADM

## 2018-02-21 RX ORDER — NITROGLYCERIN 0.4 MG/1
0.4 TABLET SUBLINGUAL
Status: DISCONTINUED | OUTPATIENT
Start: 2018-02-21 | End: 2018-02-21 | Stop reason: HOSPADM

## 2018-02-21 RX ORDER — BUPIVACAINE HYDROCHLORIDE 5 MG/ML
INJECTION, SOLUTION EPIDURAL; INTRACAUDAL AS NEEDED
Status: DISCONTINUED | OUTPATIENT
Start: 2018-02-21 | End: 2018-02-21 | Stop reason: HOSPADM

## 2018-02-21 RX ORDER — LIDOCAINE HYDROCHLORIDE 10 MG/ML
INJECTION, SOLUTION INFILTRATION; PERINEURAL AS NEEDED
Status: DISCONTINUED | OUTPATIENT
Start: 2018-02-21 | End: 2018-02-21 | Stop reason: HOSPADM

## 2018-02-21 RX ADMIN — Medication 2000 MG: at 08:48
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
## 2018-02-21 NOTE — PLAN OF CARE
Problem: Patient Care Overview (Adult)  Goal: Plan of Care Review  Outcome: Ongoing (interventions implemented as appropriate)   02/21/18 1215   Coping/Psychosocial Response Interventions   Plan Of Care Reviewed With patient;family   Patient Care Overview   Progress improving   Outcome Evaluation   Outcome Summary/Follow up Plan PT AND FAMILY ABLE TO VERBALIZE UNDERSTANDING OF DC INSTRUCTIONS- NO QUESTIONS AT THIS TIME- SITE STABLE AND AMBULATING WITH NO ISSUES.     Goal: Discharge Needs Assessment  Outcome: Ongoing (interventions implemented as appropriate)   02/21/18 1215   Discharge Needs Assessment   Concerns To Be Addressed no discharge needs identified       Problem: Cardiac Rhythm Management Device (Adult)  Goal: Signs and Symptoms of Listed Potential Problems Will be Absent or Manageable (Cardiac Rhythm Management Device)  Outcome: Ongoing (interventions implemented as appropriate)   02/21/18 1215   Cardiac Rhythm Management Device   Problems Assessed (Cardiac Rhythm Management Device) all   Problems Present (Cardiac Rhythm Management Device) none

## 2018-02-22 ENCOUNTER — TELEPHONE (OUTPATIENT)
Dept: CARDIOLOGY | Facility: CLINIC | Age: 63
End: 2018-02-22

## 2018-02-22 NOTE — TELEPHONE ENCOUNTER
Pt wife called his side is some swollen and is red next to bandage. No fever, No drainage. 1 day post op. Wife will watch for fever, drainage or streaking and call us back. They live in [facility] and are unable to come to the office for a wound check. They will be getting wound check on 3/1/2018. Stressed to wife to watch for signs and symptoms of infection.

## 2018-03-01 ENCOUNTER — OFFICE VISIT (OUTPATIENT)
Dept: CARDIOLOGY | Facility: CLINIC | Age: 63
End: 2018-03-01

## 2018-03-01 ENCOUNTER — TELEPHONE (OUTPATIENT)
Dept: CARDIOLOGY | Facility: CLINIC | Age: 63
End: 2018-03-01

## 2018-03-01 DIAGNOSIS — R00.1 SYMPTOMATIC BRADYCARDIA: Primary | ICD-10-CM

## 2018-03-01 PROCEDURE — 99024 POSTOP FOLLOW-UP VISIT: CPT | Performed by: INTERNAL MEDICINE

## 2018-03-01 NOTE — TELEPHONE ENCOUNTER
I tried to call the patient. No answer. I left a message. I successfully faxed the clearance to [phone].

## 2018-03-01 NOTE — TELEPHONE ENCOUNTER
Patient had a PM generator change on 2/21/18. He needs clearance for hemorrhoid surgery. How long does he need to hold Aspirin prior to?                Dr. Yemi Mclaughlin  (P)[phone]

## 2018-03-01 NOTE — PROGRESS NOTES
WOUND CHECK    2018    Cecilio Fierro, : 1955    WOUND CHECK    B/P:  (Sitting)  130/82 LEFT ARM  (Standing)     Pulse: 70     Patient has fever: [] Temperature if indicated: 98.4    Wound Location:  Dressing Removed [x]        Old Dressing Appearance:  Clean, dry [x]                 Old, bloody drainage []                            Moist, serous drainage []                Moist, thick yellow/green drainage []       Wound Appearance: Redness []                  Drainage []                  Culture obtained []        Color: N/A     Consistency: NA     Amount: none         Gloves used, wound cleansed with sterile 4x4 and peroxide [x]       MD notified [] MD orders:   Antibiotic started []  If checked, type   Other:     Appointment for follow-up scheduled for 3 months post procedure [x]    Future Appointments  Date Time Provider Department Center   3/1/2018 11:30 AM WOUND CHECK [facility] None   2018 11:30 AM Rob Almonte MD [facility] None   2018 4:00 PM Montez Latham DO [facility] None   3/12/2019 12:45 PM Davian Huizar MD [facility] None           Nuris Medrano MA, 18      MD Signature:______________________________ Completed By/Date:

## 2018-03-08 RX ORDER — SERTRALINE HYDROCHLORIDE 100 MG/1
TABLET, FILM COATED ORAL
Qty: 138 TABLET | Refills: 1 | OUTPATIENT
Start: 2018-03-08

## 2018-03-12 ENCOUNTER — TELEPHONE (OUTPATIENT)
Dept: CARDIOLOGY | Facility: CLINIC | Age: 63
End: 2018-03-12

## 2018-03-12 NOTE — TELEPHONE ENCOUNTER
Pt needs cardiac clearance to hold ASA 5 days prior to hemorrhoidectomy planned with Dr. Cole Mclaughlin. Pt was advised not to hold aspirin however may decrease to 81 mg daily. Cleared by [facility]. Clearance letter faxed in Epic. Pt does have dual chamber SJM PPM followed by Dr. Latham. KH

## 2018-04-03 RX ORDER — SERTRALINE HYDROCHLORIDE 100 MG/1
TABLET, FILM COATED ORAL
Qty: 138 TABLET | Refills: 1 | OUTPATIENT
Start: 2018-04-03

## 2018-04-03 RX ORDER — ARIPIPRAZOLE 2 MG/1
TABLET ORAL
Qty: 30 TABLET | Refills: 2 | OUTPATIENT
Start: 2018-04-03

## 2018-04-11 ENCOUNTER — OFFICE VISIT (OUTPATIENT)
Dept: PSYCHIATRY | Facility: CLINIC | Age: 63
End: 2018-04-11

## 2018-04-11 VITALS
DIASTOLIC BLOOD PRESSURE: 101 MMHG | WEIGHT: 291 LBS | HEIGHT: 71 IN | HEART RATE: 75 BPM | BODY MASS INDEX: 40.74 KG/M2 | SYSTOLIC BLOOD PRESSURE: 158 MMHG

## 2018-04-11 DIAGNOSIS — F33.41 MDD (MAJOR DEPRESSIVE DISORDER), RECURRENT, IN PARTIAL REMISSION (HCC): ICD-10-CM

## 2018-04-11 DIAGNOSIS — F10.21 ALCOHOL DEPENDENCE IN REMISSION (HCC): Primary | ICD-10-CM

## 2018-04-11 PROCEDURE — 99213 OFFICE O/P EST LOW 20 MIN: CPT | Performed by: PSYCHIATRY & NEUROLOGY

## 2018-04-11 RX ORDER — DULOXETIN HYDROCHLORIDE 60 MG/1
CAPSULE, DELAYED RELEASE ORAL
Qty: 60 CAPSULE | Refills: 3 | Status: SHIPPED | OUTPATIENT
Start: 2018-04-11 | End: 2018-09-05 | Stop reason: SDUPTHER

## 2018-04-11 RX ORDER — DOXEPIN HYDROCHLORIDE 50 MG/1
50 CAPSULE ORAL NIGHTLY
Qty: 30 CAPSULE | Refills: 3 | Status: SHIPPED | OUTPATIENT
Start: 2018-04-11 | End: 2018-07-21

## 2018-04-11 RX ORDER — ARIPIPRAZOLE 2 MG/1
TABLET ORAL
COMMUNITY
Start: 2018-03-07 | End: 2018-04-11 | Stop reason: SINTOL

## 2018-04-11 RX ORDER — TRAZODONE HYDROCHLORIDE 100 MG/1
TABLET ORAL
Qty: 60 TABLET | Refills: 3 | Status: SHIPPED | OUTPATIENT
Start: 2018-04-11 | End: 2018-09-05 | Stop reason: SDUPTHER

## 2018-04-11 RX ORDER — LORAZEPAM 2 MG/1
TABLET ORAL
Qty: 60 TABLET | Refills: 3 | Status: SHIPPED | OUTPATIENT
Start: 2018-04-11 | End: 2018-09-05 | Stop reason: SDUPTHER

## 2018-04-11 NOTE — PROGRESS NOTES
"Patient ID: Cecilio Fierro is a 63 y.o. male    SERVICE TYPE: EVALUATION AND MANAGEMENT (greater than 50% of the time spent for supportive psychotherapy).      BP (!) 158/101   Pulse 75   Ht 180.3 cm (70.98\")   Wt 132 kg (291 lb)   BMI 40.60 kg/m²     ALLERGIES:  Zofran [ondansetron hcl]    CC: \"    FOLLOWED FOR: Depression/ chronic pain.     HPI:reports his depression \"rough at times, get tore up easy\". No hopeless or SI. \"Just aggravated with his medical/orthopedic limitations and not being able to do what he is accustomed to doing. Real problems with insomnia - to bed @ 9 PM up @ 2-3 AM, 5-6 hours restless sleep with his BPAP (O2). No alcohol.     PFSH: home life stable, still involved and enjoying the 2 y.o great grandson, primary  is the Grandmother. .     Review of Systems   Respiratory: Positive for shortness of breath.    Cardiovascular: Negative.    Gastrointestinal: Negative.    Neurological: Positive for weakness.   Since last contact:    Pacemaker  Hemorrhoid surgery.   Scheduled for Lumbar back surgery per Sadia GOZNALES in May.    \"Just not up to par\"    SUPPORTIVE PSYCHOTHERAPY: continuing efforts to promote the therapeutic alliance, address the patient’s issues, and strengthen self awareness, insights, and coping skills.       Mental Status Exam  Appearance:  clean and casually dressed, appropriate  Attitude toward clinician:  cooperative and agreeable   Speech:    Rate:  regular rate and rhythm   Volume: normal  Motor:  no abnormal movements present  Mood:  Good  Affect:  euthymic  Thought Processes:  linear, logical, and goal directed  Thought Content:  Normal   Feeling Hopeless: absent  Suicidal Thoughts:  absent  Homicidal Thoughts:  absent  Perceptual Disturbance: no perceptual disturbance  Attention and Concentration:  good  Insight and Judgement:  good  Memory:  memory appears to be intact    LABS: No results found for this or any previous visit (from the past 168 " hour(s)).    MEDICATION ISSUES: Have discussed with the patient the medications Risks, Benefits, and Side effects including potential falls, possible impaired driving and  metabolic adversities among others. No medication side effects or related complaints today.     TREATMENT PLAN/GOALS: Continue supportive psychotherapy efforts and medications as indicated.     Current Outpatient Prescriptions   Medication Sig Dispense Refill   • aMILoride (MIDAMOR) 5 MG tablet Take 5 mg by mouth Daily. Prescribed BID but takes daily only     • aspirin 325 MG tablet Take 325 mg by mouth Daily.     • benzonatate (TESSALON) 200 MG capsule Take 200 mg by mouth 3 (Three) Times a Day As Needed for Cough.     • bumetanide (BUMEX) 1 MG tablet Take 1 mg by mouth 2 (Two) Times a Day.     • cetirizine (zyrTEC) 10 MG tablet Take 10 mg by mouth Daily.     • dicyclomine (BENTYL) 10 MG capsule Take 10 mg by mouth 3 (Three) Times a Day.     • DilTIAZem HCl Coated Beads (DILTIAZEM CD PO) Take 180 mg by mouth Daily.     • DULoxetine (CYMBALTA) 60 MG capsule Take one bid. 60 capsule 3   • eplerenone (INSPRA) 50 MG tablet Take 50 mg by mouth Daily.     • folic acid (FOLVITE) 1 MG tablet Take 1 mg by mouth Daily.     • hydrALAZINE (APRESOLINE) 25 MG tablet Take 25 mg by mouth 3 (Three) Times a Day.     • lisinopril (PRINIVIL,ZESTRIL) 10 MG tablet Take 10 mg by mouth Daily.     • LORazepam (ATIVAN) 2 MG tablet One half or 1 twice a day when necessary anxiety 60 tablet 3   • metoprolol tartrate (LOPRESSOR) 100 MG tablet Take 100 mg by mouth 2 (Two) Times a Day.     • montelukast (SINGULAIR) 10 MG tablet Take 10 mg by mouth Every Night.     • nitroglycerin (NITROSTAT) 0.4 MG SL tablet Place 0.4 mg under the tongue Every 5 (Five) Minutes As Needed for Chest Pain.     • omeprazole (priLOSEC) 40 MG capsule Take 40 mg by mouth Daily.     • potassium chloride (K-DUR,KLOR-CON) 20 MEQ CR tablet Take 20 mEq by mouth 2 (Two) Times a Day.     • pravastatin  (PRAVACHOL) 40 MG tablet Take 40 mg by mouth Daily.     • promethazine (PHENERGAN) 25 MG tablet Take 25 mg by mouth Every 6 (Six) Hours As Needed for Nausea or Vomiting.     • raNITIdine (ZANTAC) 300 MG tablet Take 300 mg by mouth 2 (Two) Times a Day.     • rOPINIRole (REQUIP) 1 MG tablet Take 1 mg by mouth Every Night. Take 1 hour before bedtime.     • traZODone (DESYREL) 100 MG tablet Take two at hs 60 tablet 3   • doxepin (SINEquan) 50 MG capsule Take 1 capsule by mouth Every Night. 30 capsule 3     No current facility-administered medications for this visit.        COLLATERAL PSYCHOTHERAPEUTIC INTERVENTION: patient not interested in additional psychotherapy.     Encounter Diagnoses   Name Primary?   • Alcohol dependence in remission Yes   • MDD (major depressive disorder), recurrent, in partial remission        Return in about 4 months (around 8/11/2018).  Patient knows to call if symptoms worsen or fail to improve between appointments.

## 2018-04-17 ENCOUNTER — TELEPHONE (OUTPATIENT)
Dept: CARDIOLOGY | Facility: CLINIC | Age: 63
End: 2018-04-17

## 2018-04-17 NOTE — TELEPHONE ENCOUNTER
Patient had a pacemaker generator change on 2/21/18. He is c/o of tenderness at his incision site and he is still very weak and has a very raspy voice still. [facility].

## 2018-04-18 ENCOUNTER — HOSPITAL ENCOUNTER (OUTPATIENT)
Dept: CARDIOLOGY | Facility: HOSPITAL | Age: 63
Discharge: HOME | End: 2018-04-18
Attending: NURSE PRACTITIONER | Admitting: NURSE PRACTITIONER

## 2018-04-18 ENCOUNTER — HOSPITAL ENCOUNTER (OUTPATIENT)
Dept: GENERAL RADIOLOGY | Facility: HOSPITAL | Age: 63
Discharge: HOME | End: 2018-04-18
Attending: NURSE PRACTITIONER

## 2018-04-18 ENCOUNTER — OFFICE VISIT (OUTPATIENT)
Dept: CARDIOLOGY | Facility: HOSPITAL | Age: 63
End: 2018-04-18

## 2018-04-18 ENCOUNTER — LAB (OUTPATIENT)
Dept: LAB | Facility: HOSPITAL | Age: 63
End: 2018-04-18

## 2018-04-18 ENCOUNTER — OFFICE VISIT (OUTPATIENT)
Dept: CARDIOLOGY | Facility: CLINIC | Age: 63
End: 2018-04-18

## 2018-04-18 VITALS
HEIGHT: 71 IN | TEMPERATURE: 98.8 F | WEIGHT: 285.8 LBS | OXYGEN SATURATION: 96 % | DIASTOLIC BLOOD PRESSURE: 78 MMHG | RESPIRATION RATE: 16 BRPM | SYSTOLIC BLOOD PRESSURE: 121 MMHG | BODY MASS INDEX: 40.01 KG/M2 | HEART RATE: 76 BPM

## 2018-04-18 DIAGNOSIS — Z95.0 PACEMAKER: ICD-10-CM

## 2018-04-18 DIAGNOSIS — R06.02 SHORTNESS OF BREATH: ICD-10-CM

## 2018-04-18 DIAGNOSIS — R00.1 BRADYCARDIA: ICD-10-CM

## 2018-04-18 DIAGNOSIS — R00.1 SYMPTOMATIC BRADYCARDIA: Primary | ICD-10-CM

## 2018-04-18 DIAGNOSIS — R00.1 SYMPTOMATIC BRADYCARDIA: ICD-10-CM

## 2018-04-18 DIAGNOSIS — I10 ESSENTIAL HYPERTENSION: ICD-10-CM

## 2018-04-18 DIAGNOSIS — Z95.0 CARDIAC PACEMAKER IN SITU: Primary | ICD-10-CM

## 2018-04-18 LAB
ALBUMIN SERPL-MCNC: 4.1 G/DL (ref 3.2–4.8)
ALBUMIN/GLOB SERPL: 1.5 G/DL (ref 1.5–2.5)
ALP SERPL-CCNC: 127 U/L (ref 25–100)
ALT SERPL W P-5'-P-CCNC: 18 U/L (ref 7–40)
ANION GAP SERPL CALCULATED.3IONS-SCNC: 4 MMOL/L (ref 3–11)
AST SERPL-CCNC: 18 U/L (ref 0–33)
BASOPHILS # BLD AUTO: 0.03 10*3/MM3 (ref 0–0.2)
BASOPHILS NFR BLD AUTO: 0.2 % (ref 0–1)
BILIRUB SERPL-MCNC: 0.2 MG/DL (ref 0.3–1.2)
BNP SERPL-MCNC: 13 PG/ML (ref 0–100)
BUN BLD-MCNC: 14 MG/DL (ref 9–23)
BUN/CREAT SERPL: 12.7 (ref 7–25)
CALCIUM SPEC-SCNC: 9 MG/DL (ref 8.7–10.4)
CHLORIDE SERPL-SCNC: 100 MMOL/L (ref 99–109)
CO2 SERPL-SCNC: 33 MMOL/L (ref 20–31)
CREAT BLD-MCNC: 1.1 MG/DL (ref 0.6–1.3)
DEPRECATED RDW RBC AUTO: 47.6 FL (ref 37–54)
EOSINOPHIL # BLD AUTO: 0.22 10*3/MM3 (ref 0–0.3)
EOSINOPHIL NFR BLD AUTO: 1.6 % (ref 0–3)
ERYTHROCYTE [DISTWIDTH] IN BLOOD BY AUTOMATED COUNT: 14.9 % (ref 11.3–14.5)
GFR SERPL CREATININE-BSD FRML MDRD: 68 ML/MIN/1.73
GLOBULIN UR ELPH-MCNC: 2.7 GM/DL
GLUCOSE BLD-MCNC: 88 MG/DL (ref 70–100)
HCT VFR BLD AUTO: 39.5 % (ref 38.9–50.9)
HGB BLD-MCNC: 12.4 G/DL (ref 13.1–17.5)
IMM GRANULOCYTES # BLD: 0.07 10*3/MM3 (ref 0–0.03)
IMM GRANULOCYTES NFR BLD: 0.5 % (ref 0–0.6)
LYMPHOCYTES # BLD AUTO: 2.52 10*3/MM3 (ref 0.6–4.8)
LYMPHOCYTES NFR BLD AUTO: 18.5 % (ref 24–44)
MCH RBC QN AUTO: 27.7 PG (ref 27–31)
MCHC RBC AUTO-ENTMCNC: 31.4 G/DL (ref 32–36)
MCV RBC AUTO: 88.4 FL (ref 80–99)
MONOCYTES # BLD AUTO: 1.24 10*3/MM3 (ref 0–1)
MONOCYTES NFR BLD AUTO: 9.1 % (ref 0–12)
NEUTROPHILS # BLD AUTO: 9.6 10*3/MM3 (ref 1.5–8.3)
NEUTROPHILS NFR BLD AUTO: 70.6 % (ref 41–71)
PLATELET # BLD AUTO: 284 10*3/MM3 (ref 150–450)
PMV BLD AUTO: 9.8 FL (ref 6–12)
POTASSIUM BLD-SCNC: 3.6 MMOL/L (ref 3.5–5.5)
PROT SERPL-MCNC: 6.8 G/DL (ref 5.7–8.2)
RBC # BLD AUTO: 4.47 10*6/MM3 (ref 4.2–5.76)
SODIUM BLD-SCNC: 137 MMOL/L (ref 132–146)
WBC NRBC COR # BLD: 13.61 10*3/MM3 (ref 3.5–10.8)

## 2018-04-18 PROCEDURE — 99024 POSTOP FOLLOW-UP VISIT: CPT | Performed by: INTERNAL MEDICINE

## 2018-04-18 PROCEDURE — 99214 OFFICE O/P EST MOD 30 MIN: CPT | Performed by: NURSE PRACTITIONER

## 2018-04-18 PROCEDURE — 93010 ELECTROCARDIOGRAM REPORT: CPT | Performed by: INTERNAL MEDICINE

## 2018-04-18 PROCEDURE — 83880 ASSAY OF NATRIURETIC PEPTIDE: CPT

## 2018-04-18 PROCEDURE — 93005 ELECTROCARDIOGRAM TRACING: CPT | Performed by: NURSE PRACTITIONER

## 2018-04-18 PROCEDURE — 71046 X-RAY EXAM CHEST 2 VIEWS: CPT

## 2018-04-18 PROCEDURE — 36415 COLL VENOUS BLD VENIPUNCTURE: CPT

## 2018-04-18 PROCEDURE — 85025 COMPLETE CBC W/AUTO DIFF WBC: CPT

## 2018-04-18 PROCEDURE — 80053 COMPREHEN METABOLIC PANEL: CPT

## 2018-04-18 RX ORDER — METHOCARBAMOL 750 MG/1
750 TABLET, FILM COATED ORAL 4 TIMES DAILY PRN
COMMUNITY
End: 2020-04-13

## 2018-04-18 RX ORDER — DOXYCYCLINE HYCLATE 100 MG
100 TABLET ORAL 2 TIMES DAILY
Qty: 20 TABLET | Refills: 0 | Status: SHIPPED | OUTPATIENT
Start: 2018-04-18 | End: 2018-07-06 | Stop reason: ALTCHOICE

## 2018-04-18 RX ORDER — AMOXICILLIN 500 MG
1200 CAPSULE ORAL 2 TIMES DAILY
COMMUNITY
End: 2019-02-01

## 2018-04-18 NOTE — PROGRESS NOTES
Pt came today to let us look at wound, due to what he states is small amount of clear slightly pink drainage that occurs every morning and very tender around incision, s/p ppm gen chg 2/21/18.  He has had no chills or fevers.  I cleaned the left subclavian site with 4x4/peroxide and could not find any areas that appeared they could be draining.  Overall the site is well-approximated with no redness, swelling or drainage present at this time.  After talking with the patient more, he stated that he pulled a suture approx 1 inch long from the proximal site of the incision several weeks ago.  I can see an area that appears to be healing where the suture was likely pulled from. I discussed with Dr. Latham and we will prescribe him doxycycline 100mg BID for 10 days due to fact he pulled suture out on his own and that he states he has drainage from site even though today I cannot see any.

## 2018-04-30 ENCOUNTER — HOSPITAL ENCOUNTER (OUTPATIENT)
Dept: CARDIOLOGY | Facility: HOSPITAL | Age: 63
Discharge: HOME | End: 2018-04-30
Admitting: NURSE PRACTITIONER

## 2018-04-30 DIAGNOSIS — R06.02 SHORTNESS OF BREATH: ICD-10-CM

## 2018-04-30 DIAGNOSIS — R00.1 SYMPTOMATIC BRADYCARDIA: ICD-10-CM

## 2018-04-30 DIAGNOSIS — Z95.0 PACEMAKER: ICD-10-CM

## 2018-04-30 DIAGNOSIS — I10 ESSENTIAL HYPERTENSION: ICD-10-CM

## 2018-04-30 PROCEDURE — 93306 TTE W/DOPPLER COMPLETE: CPT | Performed by: INTERNAL MEDICINE

## 2018-04-30 PROCEDURE — 93306 TTE W/DOPPLER COMPLETE: CPT

## 2018-04-30 RX ORDER — ARIPIPRAZOLE 2 MG/1
TABLET ORAL
Qty: 30 TABLET | Refills: 2 | OUTPATIENT
Start: 2018-04-30

## 2018-04-30 RX ORDER — SERTRALINE HYDROCHLORIDE 100 MG/1
TABLET, FILM COATED ORAL
Qty: 138 TABLET | Refills: 1 | OUTPATIENT
Start: 2018-04-30

## 2018-05-01 LAB
BH CV ECHO MEAS - ACS: 2.8 CM
BH CV ECHO MEAS - AO MAX PG: 5.5 MMHG
BH CV ECHO MEAS - AO MEAN PG: 3.2 MMHG
BH CV ECHO MEAS - AO ROOT AREA (BSA CORRECTED): 1.6
BH CV ECHO MEAS - AO ROOT AREA: 12 CM^2
BH CV ECHO MEAS - AO ROOT DIAM: 3.9 CM
BH CV ECHO MEAS - AO V2 MAX: 117 CM/SEC
BH CV ECHO MEAS - AO V2 MEAN: 85.3 CM/SEC
BH CV ECHO MEAS - AO V2 VTI: 24.9 CM
BH CV ECHO MEAS - BSA(HAYCOCK): 2.6 M^2
BH CV ECHO MEAS - BSA: 2.5 M^2
BH CV ECHO MEAS - BZI_BMI: 39.7 KILOGRAMS/M^2
BH CV ECHO MEAS - BZI_METRIC_HEIGHT: 180.3 CM
BH CV ECHO MEAS - BZI_METRIC_WEIGHT: 129.3 KG
BH CV ECHO MEAS - CONTRAST EF 4CH: 52.2 ML/M^2
BH CV ECHO MEAS - EDV(CUBED): 108.7 ML
BH CV ECHO MEAS - EDV(MOD-SP4): 92 ML
BH CV ECHO MEAS - EDV(TEICH): 106 ML
BH CV ECHO MEAS - EF(CUBED): 9 %
BH CV ECHO MEAS - EF(MOD-SP4): 52.2 %
BH CV ECHO MEAS - EF(TEICH): 7.1 %
BH CV ECHO MEAS - ESV(CUBED): 98.8 ML
BH CV ECHO MEAS - ESV(MOD-SP4): 44 ML
BH CV ECHO MEAS - ESV(TEICH): 98.5 ML
BH CV ECHO MEAS - FS: 3.1 %
BH CV ECHO MEAS - IVS/LVPW: 1.2
BH CV ECHO MEAS - IVSD: 1.8 CM
BH CV ECHO MEAS - LA DIMENSION: 4 CM
BH CV ECHO MEAS - LA/AO: 1
BH CV ECHO MEAS - LV DIASTOLIC VOL/BSA (35-75): 37.5 ML/M^2
BH CV ECHO MEAS - LV MASS(C)D: 336.8 GRAMS
BH CV ECHO MEAS - LV MASS(C)DI: 137.4 GRAMS/M^2
BH CV ECHO MEAS - LV SYSTOLIC VOL/BSA (12-30): 17.9 ML/M^2
BH CV ECHO MEAS - LVIDD: 4.8 CM
BH CV ECHO MEAS - LVIDS: 4.6 CM
BH CV ECHO MEAS - LVLD AP4: 8.5 CM
BH CV ECHO MEAS - LVLS AP4: 7.4 CM
BH CV ECHO MEAS - LVOT AREA (M): 3.5 CM^2
BH CV ECHO MEAS - LVOT AREA: 3.6 CM^2
BH CV ECHO MEAS - LVOT DIAM: 2.1 CM
BH CV ECHO MEAS - LVPWD: 1.5 CM
BH CV ECHO MEAS - MV A MAX VEL: 78 CM/SEC
BH CV ECHO MEAS - MV E MAX VEL: 61.2 CM/SEC
BH CV ECHO MEAS - MV E/A: 0.78
BH CV ECHO MEAS - PA ACC SLOPE: 1499 CM/SEC^2
BH CV ECHO MEAS - PA ACC TIME: 0.06 SEC
BH CV ECHO MEAS - PA PR(ACCEL): 53.6 MMHG
BH CV ECHO MEAS - RAP SYSTOLE: 10 MMHG
BH CV ECHO MEAS - RVSP: 33.3 MMHG
BH CV ECHO MEAS - SI(AO): 121.7 ML/M^2
BH CV ECHO MEAS - SI(CUBED): 4 ML/M^2
BH CV ECHO MEAS - SI(MOD-SP4): 19.6 ML/M^2
BH CV ECHO MEAS - SI(TEICH): 3.1 ML/M^2
BH CV ECHO MEAS - SV(AO): 298.2 ML
BH CV ECHO MEAS - SV(CUBED): 9.8 ML
BH CV ECHO MEAS - SV(MOD-SP4): 48 ML
BH CV ECHO MEAS - SV(TEICH): 7.5 ML
BH CV ECHO MEAS - TR MAX VEL: 241.3 CM/SEC
MAXIMAL PREDICTED HEART RATE: 159 BPM
STRESS TARGET HR: 135 BPM

## 2018-05-15 ENCOUNTER — TELEPHONE (OUTPATIENT)
Dept: CARDIOLOGY | Facility: CLINIC | Age: 63
End: 2018-05-15

## 2018-05-15 NOTE — TELEPHONE ENCOUNTER
[facility] called to talk about cardiac clearance given at 3/2018 visit. Pt is having new CP she would like to discuss. He recently had gen change with Dr. Latham and ?related to pocket of device. Left message to return my call to discuss if appt needed. [phone]

## 2018-05-16 ENCOUNTER — TELEPHONE (OUTPATIENT)
Dept: CARDIOLOGY | Facility: CLINIC | Age: 63
End: 2018-05-16

## 2018-05-16 NOTE — TELEPHONE ENCOUNTER
Called pt to check on ppm site, pt's wife states he is having no issues with it and no drainage, they will keep f/u on 6/4.

## 2018-06-20 ENCOUNTER — OFFICE VISIT (OUTPATIENT)
Dept: PSYCHIATRY | Facility: CLINIC | Age: 63
End: 2018-06-20

## 2018-06-20 VITALS
HEIGHT: 71 IN | SYSTOLIC BLOOD PRESSURE: 150 MMHG | DIASTOLIC BLOOD PRESSURE: 89 MMHG | HEART RATE: 73 BPM | WEIGHT: 286 LBS | BODY MASS INDEX: 40.04 KG/M2

## 2018-06-20 DIAGNOSIS — F33.40 MAJOR DEPRESSIVE DISORDER, RECURRENT, IN REMISSION (HCC): ICD-10-CM

## 2018-06-20 DIAGNOSIS — F10.21 ALCOHOL DEPENDENCE IN REMISSION (HCC): ICD-10-CM

## 2018-06-20 DIAGNOSIS — Z79.899 MEDICATION MANAGEMENT: Primary | ICD-10-CM

## 2018-06-20 LAB
AMPHETAMINE CUT-OFF: 1000
BENZODIAZIPINE CUT-OFF: 300
BUPRENORPHINE CUT-OFF: 10
COCAINE CUT-OFF: 300
EXTERNAL AMPHETAMINE SCREEN URINE: NEGATIVE
EXTERNAL BENZODIAZEPINE SCREEN URINE: POSITIVE
EXTERNAL BUPRENORPHINE SCREEN URINE: NEGATIVE
EXTERNAL COCAINE SCREEN URINE: NEGATIVE
EXTERNAL MDMA: NEGATIVE
EXTERNAL METHADONE SCREEN URINE: NEGATIVE
EXTERNAL METHAMPHETAMINE SCREEN URINE: NEGATIVE
EXTERNAL OPIATES SCREEN URINE: NEGATIVE
EXTERNAL OXYCODONE SCREEN URINE: NEGATIVE
EXTERNAL THC SCREEN URINE: NEGATIVE
MDMA CUT-OFF: 500
METHADONE CUT-OFF: 300
METHAMPHETAMINE CUT-OFF: 1000
OPIATES CUT-OFF: 300
OXYCODONE CUT-OFF: 100
THC CUT-OFF: 50

## 2018-06-20 PROCEDURE — 99213 OFFICE O/P EST LOW 20 MIN: CPT | Performed by: PSYCHIATRY & NEUROLOGY

## 2018-06-20 NOTE — PROGRESS NOTES
"Patient ID: Cecilio Fierro is a 63 y.o. male    SERVICE TYPE: EVALUATION AND MANAGEMENT (greater than 50% of the time spent for supportive psychotherapy).      /89   Pulse 73   Ht 180.3 cm (70.98\")   Wt 130 kg (286 lb)   BMI 39.91 kg/m²     ALLERGIES:  Zofran [ondansetron hcl]    CC: \"OK\"    FOLLOWED FOR:Depression/ chronic pain.      HPI: \"Aggravated\" but denies being depressed, no hopelessness or SI. No drinking. Sporadic problems sleeping. Interest about normal, activities limited due to his compounding medical, orthopedic problems.     Actually here today for a letter to the [facility], given a note on a Rx that should suffice for now.  Patient has his medications to do till his next appointment in August.    PFSH: enjoys his grandsons. Home life stable.     Review of Systems   Respiratory: Positive for shortness of breath.    Cardiovascular: Positive for palpitations.   Gastrointestinal:        Recent lipoma excised from his abdominal RLQ.    Musculoskeletal: Positive for back pain.        Bad knee problems.    Neurological: Positive for headaches.      Scheduled to be evaluated for Bariatric Surgery July 16. Good idea.   Has appointment with Sadia GONZALES in [place] for his back problem.     SUPPORTIVE PSYCHOTHERAPY: continuing efforts to promote the therapeutic alliance, address the patient’s issues, and strengthen self awareness, insights, and coping skills.       Mental Status Exam  Appearance:  clean and casually dressed, appropriate  Attitude toward clinician:  cooperative and agreeable   Speech:    Rate:  regular rate and rhythm   Volume: normal  Motor:  no abnormal movements present  Mood:  Good  Affect:  euthymic  Thought Processes:  linear, logical, and goal directed  Thought Content:  Normal   Feeling Hopeless: absent  Suicidal Thoughts:  absent  Homicidal Thoughts:  absent  Perceptual Disturbance: no perceptual disturbance  Attention and Concentration:  good  Insight and Judgement:  " good  Memory:  memory appears to be intact    LABS:   Recent Results (from the past 168 hour(s))   [facility] Drug Screen    Collection Time: 06/20/18 11:24 AM   Result Value Ref Range    External Amphetamine Screen Urine Negative     Amphetamine Cut-Off 1,000     External Benzodiazepine Screen Urine Positive     Benzodiazipine Cut-Off 300     External Cocaine Screen Urine Negative     Cocaine Cut-Off 300     External THC Screen Urine Negative     THC Cut-Off 50     External Methadone Screen Urine Negative     Methadone Cut-Off 300     External Methamphetamine Screen Urine Negative     Methamphetamine Cut-Off 1,000     External Oxycodone Screen Urine Negative     Oxycodone Cut-Off 100     External Buprenorphine Screen Urine Negative     Buprenorphine Cut-Off 10     External MDMA Negative     MDMA Cut-Off 500     External Opiates Screen Urine Negative     Opiates Cut-Off 300        MEDICATION ISSUES: Have discussed with the patient the medications Risks, Benefits, and Side effects including potential falls, possible impaired driving and  metabolic adversities among others. No medication side effects or related complaints today.     TREATMENT PLAN/GOALS: Continue supportive psychotherapy efforts and medications as indicated.     Current Outpatient Prescriptions   Medication Sig Dispense Refill   • aMILoride (MIDAMOR) 5 MG tablet Take 5 mg by mouth Daily. Prescribed BID but takes daily only     • aspirin 325 MG tablet Take 325 mg by mouth Daily.     • benzonatate (TESSALON) 200 MG capsule Take 200 mg by mouth 3 (Three) Times a Day As Needed for Cough.     • bumetanide (BUMEX) 1 MG tablet Take 1 mg by mouth 2 (Two) Times a Day.     • cetirizine (zyrTEC) 10 MG tablet Take 10 mg by mouth Daily.     • dicyclomine (BENTYL) 10 MG capsule Take 10 mg by mouth 3 (Three) Times a Day As Needed.     • DilTIAZem HCl Coated Beads (DILTIAZEM CD PO) Take 360 mg by mouth Daily.     • doxepin (SINEquan) 50 MG capsule Take 1 capsule by  mouth Every Night.     • doxycycline (VIBRAMYICN) 100 MG tablet Take 1 tablet by mouth 2 (Two) Times a Day.     • DULoxetine (CYMBALTA) 60 MG capsule Take one bid.     • eplerenone (INSPRA) 50 MG tablet Take 50 mg by mouth Daily.     • folic acid (FOLVITE) 1 MG tablet Take 1 mg by mouth Daily.     • hydrALAZINE (APRESOLINE) 25 MG tablet Take 25 mg by mouth 3 (Three) Times a Day.     • lisinopril (PRINIVIL,ZESTRIL) 10 MG tablet Take 10 mg by mouth Daily.     • LORazepam (ATIVAN) 2 MG tablet One half or 1 twice a day when necessary anxiety     • methocarbamol (ROBAXIN) 750 MG tablet Take 750 mg by mouth 4 (Four) Times a Day As Needed.     • metoprolol tartrate (LOPRESSOR) 100 MG tablet Take 100 mg by mouth 2 (Two) Times a Day.     • montelukast (SINGULAIR) 10 MG tablet Take 10 mg by mouth Every Night.     • nitroglycerin (NITROSTAT) 0.4 MG SL tablet Place 0.4 mg under the tongue Every 5 (Five) Minutes As Needed for Chest Pain.     • Omega-3 Fatty Acids (FISH OIL) 1200 MG capsule capsule Take 1,200 mg by mouth 2 (Two) Times a Day.     • omeprazole (priLOSEC) 40 MG capsule Take 40 mg by mouth Daily.     • potassium chloride (K-DUR,KLOR-CON) 20 MEQ CR tablet Take 40 mEq by mouth 3 (Three) Times a Day.     • pravastatin (PRAVACHOL) 40 MG tablet Take 40 mg by mouth Daily.     • promethazine (PHENERGAN) 25 MG tablet Take 25 mg by mouth Every 6 (Six) Hours As Needed for Nausea or Vomiting.     • raNITIdine (ZANTAC) 300 MG tablet Take 300 mg by mouth 2 (Two) Times a Day.     • rOPINIRole (REQUIP) 1 MG tablet Take 1 mg by mouth Every Night. Take 1 hour before bedtime.     • traZODone (DESYREL) 100 MG tablet Take two at hs       No current facility-administered medications for this visit.        COLLATERAL PSYCHOTHERAPEUTIC INTERVENTION: patient not interested in additional psychotherapy.     Encounter Diagnoses   Name Primary?   • Medication management Yes   • Major depressive disorder, recurrent, in remission    • Alcohol  dependence in remission        Return in about 2 months (around 8/20/2018).  Patient knows to call if symptoms worsen or fail to improve between appointments.     Dictated utilizing Dragon dictation

## 2018-07-06 ENCOUNTER — OFFICE VISIT (OUTPATIENT)
Dept: CARDIOLOGY | Facility: CLINIC | Age: 63
End: 2018-07-06

## 2018-07-06 VITALS
WEIGHT: 284 LBS | HEIGHT: 71 IN | SYSTOLIC BLOOD PRESSURE: 134 MMHG | HEART RATE: 70 BPM | DIASTOLIC BLOOD PRESSURE: 84 MMHG | BODY MASS INDEX: 39.76 KG/M2

## 2018-07-06 DIAGNOSIS — Z95.0 PACEMAKER: ICD-10-CM

## 2018-07-06 PROCEDURE — 99213 OFFICE O/P EST LOW 20 MIN: CPT | Performed by: INTERNAL MEDICINE

## 2018-07-06 PROCEDURE — 93280 PM DEVICE PROGR EVAL DUAL: CPT | Performed by: INTERNAL MEDICINE
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
## 2018-07-10 ENCOUNTER — TELEPHONE (OUTPATIENT)
Dept: CT IMAGING | Facility: HOSPITAL | Age: 63
End: 2018-07-10

## 2018-07-11 ENCOUNTER — TELEPHONE (OUTPATIENT)
Dept: CT IMAGING | Facility: HOSPITAL | Age: 63
End: 2018-07-11

## 2018-07-11 NOTE — TELEPHONE ENCOUNTER
PATIENTS WIFE RETURNED CALL. PATIENT IS AWARE OF A NODULE ON HIS RIGHT ADRENAL GLAND AND IS SEEING DR. RENE. PATIENT TO SEE SOURAV TOMORROW ABOUT REPEATING SCANS.

## 2018-07-20 ENCOUNTER — TRANSCRIBE ORDERS (OUTPATIENT)
Dept: ADMINISTRATIVE | Facility: HOSPITAL | Age: 63
End: 2018-07-20

## 2018-07-20 DIAGNOSIS — E27.9 ADRENAL HYPERTENSION (HCC): Primary | ICD-10-CM

## 2018-07-20 DIAGNOSIS — I15.2 ADRENAL HYPERTENSION (HCC): Primary | ICD-10-CM

## 2018-07-20 DIAGNOSIS — R93.89 ABNORMAL RADIOLOGICAL FINDINGS IN SKIN AND SUBCUTANEOUS TISSUE: ICD-10-CM

## 2018-07-21 RX ORDER — DOXEPIN HYDROCHLORIDE 25 MG/1
50 CAPSULE ORAL NIGHTLY
Qty: 60 CAPSULE | Refills: 2 | Status: SHIPPED | OUTPATIENT
Start: 2018-07-21 | End: 2018-09-05 | Stop reason: SDUPTHER

## 2018-07-24 ENCOUNTER — TELEPHONE (OUTPATIENT)
Dept: CT IMAGING | Facility: HOSPITAL | Age: 63
End: 2018-07-24

## 2018-07-24 NOTE — TELEPHONE ENCOUNTER
Patients wife called and is concerned about patient adrenal mass. Patient scheduled to have CT abdomen and pelvis with and without contrast July 31. Wife to pick-up oral contrast this week. Dr. Liu wants patient to have CT with adrenal protocol.

## 2018-07-31 ENCOUNTER — HOSPITAL ENCOUNTER (OUTPATIENT)
Dept: CT IMAGING | Facility: HOSPITAL | Age: 63
Discharge: HOME | End: 2018-07-31
Admitting: FAMILY MEDICINE

## 2018-07-31 DIAGNOSIS — R93.89 ABNORMAL RADIOLOGICAL FINDINGS IN SKIN AND SUBCUTANEOUS TISSUE: ICD-10-CM

## 2018-07-31 DIAGNOSIS — I15.2 ADRENAL HYPERTENSION (HCC): ICD-10-CM

## 2018-07-31 DIAGNOSIS — E27.9 ADRENAL HYPERTENSION (HCC): ICD-10-CM

## 2018-07-31 LAB — CREAT BLDA-MCNC: 1.2 MG/DL (ref 0.6–1.3)

## 2018-07-31 PROCEDURE — 25010000002 IOPAMIDOL 61 % SOLUTION: Performed by: FAMILY MEDICINE

## 2018-07-31 PROCEDURE — 82565 ASSAY OF CREATININE: CPT

## 2018-07-31 PROCEDURE — 74170 CT ABD WO CNTRST FLWD CNTRST: CPT | Performed by: RADIOLOGY

## 2018-07-31 PROCEDURE — 74170 CT ABD WO CNTRST FLWD CNTRST: CPT

## 2018-07-31 RX ADMIN — IOPAMIDOL 100 ML: 612 INJECTION, SOLUTION INTRAVENOUS at 10:34

## 2018-09-05 RX ORDER — TRAZODONE HYDROCHLORIDE 100 MG/1
TABLET ORAL
Qty: 60 TABLET | Refills: 0 | Status: SHIPPED | OUTPATIENT
Start: 2018-09-05 | End: 2018-10-01 | Stop reason: SDUPTHER

## 2018-09-05 RX ORDER — DULOXETIN HYDROCHLORIDE 60 MG/1
CAPSULE, DELAYED RELEASE ORAL
Qty: 60 CAPSULE | Refills: 0 | Status: SHIPPED | OUTPATIENT
Start: 2018-09-05 | End: 2018-10-01 | Stop reason: SDUPTHER

## 2018-09-05 RX ORDER — DOXEPIN HYDROCHLORIDE 25 MG/1
50 CAPSULE ORAL NIGHTLY
Qty: 60 CAPSULE | Refills: 0 | Status: SHIPPED | OUTPATIENT
Start: 2018-09-05 | End: 2018-10-01 | Stop reason: SDUPTHER

## 2018-09-05 RX ORDER — LORAZEPAM 2 MG/1
TABLET ORAL
Qty: 60 TABLET | Refills: 0 | Status: SHIPPED | OUTPATIENT
Start: 2018-09-05 | End: 2018-10-01 | Stop reason: SDUPTHER

## 2018-09-14 ENCOUNTER — TELEPHONE (OUTPATIENT)
Dept: CARDIOLOGY | Facility: CLINIC | Age: 63
End: 2018-09-14

## 2018-09-14 NOTE — TELEPHONE ENCOUNTER
Pt's wife calling to report pt is having issues with his adrenal glands and is seeing an endocrinologist. His potassium level has been down as low as 2 and he is currently taking 16 potassium pills a day. Meds reviewed and from a cardiac standpoint (normal EF) he is on appropriate meds for his heart. He takes bumex 1 mg BID prescribed by Dr. Odell. I told her to speak with Dr. Odell about discontinuing or holding Bumex due to hypokalemia. Pt needs to continue follow up with endocrinology. She will have endocrine notes faxed to our office. He would like to be worked in to see [facility] on 9/25/18. I will place on cancellation list. AYALA

## 2018-10-01 RX ORDER — DOXEPIN HYDROCHLORIDE 25 MG/1
50 CAPSULE ORAL NIGHTLY
Qty: 60 CAPSULE | Refills: 0 | Status: SHIPPED | OUTPATIENT
Start: 2018-10-01 | End: 2018-10-18 | Stop reason: SDUPTHER

## 2018-10-01 RX ORDER — LORAZEPAM 2 MG/1
TABLET ORAL
Qty: 60 TABLET | Refills: 0 | Status: SHIPPED | OUTPATIENT
Start: 2018-10-01 | End: 2018-10-18 | Stop reason: SDUPTHER

## 2018-10-01 RX ORDER — TRAZODONE HYDROCHLORIDE 100 MG/1
TABLET ORAL
Qty: 60 TABLET | Refills: 0 | Status: SHIPPED | OUTPATIENT
Start: 2018-10-01 | End: 2018-10-18 | Stop reason: SDUPTHER

## 2018-10-01 RX ORDER — DULOXETIN HYDROCHLORIDE 60 MG/1
CAPSULE, DELAYED RELEASE ORAL
Qty: 60 CAPSULE | Refills: 0 | Status: SHIPPED | OUTPATIENT
Start: 2018-10-01 | End: 2018-10-18 | Stop reason: SDUPTHER

## 2018-10-16 ENCOUNTER — CLINICAL SUPPORT NO REQUIREMENTS (OUTPATIENT)
Dept: CARDIOLOGY | Facility: CLINIC | Age: 63
End: 2018-10-16

## 2018-10-16 DIAGNOSIS — R00.1 SYMPTOMATIC BRADYCARDIA: ICD-10-CM

## 2018-10-16 PROCEDURE — 93296 REM INTERROG EVL PM/IDS: CPT | Performed by: INTERNAL MEDICINE

## 2018-10-16 PROCEDURE — 93294 REM INTERROG EVL PM/LDLS PM: CPT | Performed by: INTERNAL MEDICINE

## 2018-10-18 ENCOUNTER — OFFICE VISIT (OUTPATIENT)
Dept: PSYCHIATRY | Facility: CLINIC | Age: 63
End: 2018-10-18

## 2018-10-18 VITALS
SYSTOLIC BLOOD PRESSURE: 123 MMHG | WEIGHT: 287.8 LBS | BODY MASS INDEX: 40.29 KG/M2 | HEART RATE: 71 BPM | DIASTOLIC BLOOD PRESSURE: 81 MMHG | HEIGHT: 71 IN

## 2018-10-18 DIAGNOSIS — F10.21 ALCOHOL DEPENDENCE IN REMISSION (HCC): ICD-10-CM

## 2018-10-18 DIAGNOSIS — F33.1 MAJOR DEPRESSIVE DISORDER, RECURRENT EPISODE, MODERATE (HCC): Primary | ICD-10-CM

## 2018-10-18 PROCEDURE — 99213 OFFICE O/P EST LOW 20 MIN: CPT | Performed by: PSYCHIATRY & NEUROLOGY

## 2018-10-18 RX ORDER — LORAZEPAM 2 MG/1
TABLET ORAL
Qty: 60 TABLET | Refills: 2 | Status: SHIPPED | OUTPATIENT
Start: 2018-10-18 | End: 2019-01-11 | Stop reason: SDUPTHER

## 2018-10-18 RX ORDER — DULOXETIN HYDROCHLORIDE 60 MG/1
CAPSULE, DELAYED RELEASE ORAL
Qty: 60 CAPSULE | Refills: 2 | Status: SHIPPED | OUTPATIENT
Start: 2018-10-18 | End: 2019-02-01

## 2018-10-18 RX ORDER — DOXEPIN HYDROCHLORIDE 25 MG/1
50 CAPSULE ORAL NIGHTLY
Qty: 60 CAPSULE | Refills: 2 | Status: SHIPPED | OUTPATIENT
Start: 2018-10-18 | End: 2019-01-11 | Stop reason: SDUPTHER

## 2018-10-18 RX ORDER — TRAZODONE HYDROCHLORIDE 100 MG/1
TABLET ORAL
Qty: 60 TABLET | Refills: 0 | Status: SHIPPED | OUTPATIENT
Start: 2018-10-18 | End: 2018-12-06 | Stop reason: SDUPTHER

## 2018-10-18 NOTE — PROGRESS NOTES
"Patient ID: Cecilio Fierro is a 63 y.o. male    SERVICE TYPE: EVALUATION AND MANAGEMENT (greater than 50% of the time spent for supportive psychotherapy).      /81   Pulse 71   Ht 180.3 cm (70.98\")   Wt 131 kg (287 lb 12.8 oz)   BMI 40.16 kg/m²     ALLERGIES:  Zofran [ondansetron hcl]    CC/ Focus of the visit: Depression/ Chronic pain/ history of ETOH in current remission. :     HPI: \"Depressed and agitated\". No hopelessness or SI. Intermittent insomnia Despite current medication. \"There is No way I'll drink\". No psychosis/ paranoia. Current stressed with ill relatives. Not far from his baseline status.     PFSH: Worried with a brother recently diagnosis with metastatic CA. Several other family members also has or had CA. Still enjoys his grandson.     Review of Systems   Respiratory: Positive for shortness of breath.    Cardiovascular: Negative.    Gastrointestinal: Negative.    Musculoskeletal: Positive for back pain.     Diagnosed with a mass on his right adrenal gland.   New pacemaker  Declining recommended LBS. Says not taking anything for pain, specifically no Opiates.         SUPPORTIVE PSYCHOTHERAPY: continuing efforts to promote the therapeutic alliance, address the patient’s issues, and strengthen self awareness, insights, and coping skills.       Mental Status Exam  Appearance:  clean and casually dressed, appropriate  Attitude toward clinician:  cooperative and agreeable   Speech:    Rate:  regular rate and rhythm   Volume: normal  Motor:  no abnormal movements present  Mood:  \"agrevated\"   Affect:  euthymic  Thought Processes:  linear, logical, and goal directed  Thought Content:  Normal   Feeling Hopeless: absent  Suicidal Thoughts:  absent  Homicidal Thoughts:  absent  Perceptual Disturbance: no perceptual disturbance  Attention and Concentration:  Fair  Insight and Judgement:  good  Memory:  memory appears to be intact    LABS: No results found for this or any previous visit (from the past " 168 hour(s)).    MEDICATION ISSUES: Have discussed with the patient the medications Risks, Benefits, and Side effects including potential falls, possible impaired driving and  metabolic adversities among others. No medication side effects or related complaints today.     TREATMENT PLAN/GOALS: Continue supportive psychotherapy efforts and medications as indicated.     Current Outpatient Prescriptions   Medication Sig Dispense Refill   • aMILoride (MIDAMOR) 5 MG tablet Take 5 mg by mouth Daily. Prescribed BID but takes daily only     • aspirin 325 MG tablet Take 325 mg by mouth Daily.     • benzonatate (TESSALON) 200 MG capsule Take 200 mg by mouth 3 (Three) Times a Day As Needed for Cough.     • bumetanide (BUMEX) 1 MG tablet Take 1 mg by mouth 2 (Two) Times a Day.     • cetirizine (zyrTEC) 10 MG tablet Take 10 mg by mouth Daily.     • dicyclomine (BENTYL) 10 MG capsule Take 10 mg by mouth 3 (Three) Times a Day As Needed.     • DilTIAZem HCl Coated Beads (DILTIAZEM CD PO) Take 360 mg by mouth Daily.     • doxepin (SINEquan) 25 MG capsule Take 2 capsules by mouth Every Night. 60 capsule 2   • DULoxetine (CYMBALTA) 60 MG capsule Take one bid. 60 capsule 2   • eplerenone (INSPRA) 50 MG tablet Take 50 mg by mouth Daily.     • folic acid (FOLVITE) 1 MG tablet Take 1 mg by mouth Daily.     • lisinopril (PRINIVIL,ZESTRIL) 10 MG tablet Take 10 mg by mouth Daily.     • LORazepam (ATIVAN) 2 MG tablet One half or 1 twice a day when necessary anxiety 60 tablet 2   • methocarbamol (ROBAXIN) 750 MG tablet Take 750 mg by mouth 4 (Four) Times a Day As Needed.     • metoprolol tartrate (LOPRESSOR) 100 MG tablet Take 100 mg by mouth 2 (Two) Times a Day.     • montelukast (SINGULAIR) 10 MG tablet Take 10 mg by mouth Every Night.     • nitroglycerin (NITROSTAT) 0.4 MG SL tablet Place 0.4 mg under the tongue Every 5 (Five) Minutes As Needed for Chest Pain.     • Omega-3 Fatty Acids (FISH OIL) 1200 MG capsule capsule Take 1,200 mg by  mouth 2 (Two) Times a Day.     • omeprazole (priLOSEC) 40 MG capsule Take 40 mg by mouth Daily.     • potassium chloride (K-DUR,KLOR-CON) 20 MEQ CR tablet Take 40 mEq by mouth 3 (Three) Times a Day.     • pravastatin (PRAVACHOL) 40 MG tablet Take 40 mg by mouth Daily.     • promethazine (PHENERGAN) 25 MG tablet Take 25 mg by mouth Every 6 (Six) Hours As Needed for Nausea or Vomiting.     • raNITIdine (ZANTAC) 300 MG tablet Take 300 mg by mouth 2 (Two) Times a Day.     • rOPINIRole (REQUIP) 1 MG tablet Take 1 mg by mouth Every Night. Take 1 hour before bedtime.     • traZODone (DESYREL) 100 MG tablet Take two at hs 60 tablet 0     No current facility-administered medications for this visit.        COLLATERAL PSYCHOTHERAPEUTIC INTERVENTION: patient not interested in additional psychotherapy.    RISK:  Moderate.     Encounter Diagnoses   Name Primary?   • Alcohol dependence in remission (CMS/HCC)    • Major depressive disorder, recurrent episode, moderate (CMS/HCC) Yes       Return in about 3 months (around 1/18/2019).  Patient knows to call if symptoms worsen or fail to improve between appointments.     Dictated utilizing Dragon dictation

## 2018-12-06 RX ORDER — TRAZODONE HYDROCHLORIDE 100 MG/1
TABLET ORAL
Qty: 60 TABLET | Refills: 1 | Status: SHIPPED | OUTPATIENT
Start: 2018-12-06 | End: 2019-02-01 | Stop reason: SDUPTHER

## 2018-12-12 ENCOUNTER — TELEPHONE (OUTPATIENT)
Dept: CARDIOLOGY | Facility: CLINIC | Age: 63
End: 2018-12-12

## 2018-12-12 NOTE — TELEPHONE ENCOUNTER
"Pt called to report he \"dropped off\" records 2-3 months ago at our [facility]  for [facility] review. They pertained to history of multiple heart caths/interventions in [place] with Dr. Moss. No records were given to me at that time. Pt states he has retained an . He has follow up with [facility] in [facility] in March 2019. Will try to obtain records. KH  "

## 2019-01-11 RX ORDER — DOXEPIN HYDROCHLORIDE 25 MG/1
50 CAPSULE ORAL NIGHTLY
Qty: 60 CAPSULE | Refills: 0 | Status: SHIPPED | OUTPATIENT
Start: 2019-01-11 | End: 2019-02-01 | Stop reason: SDUPTHER

## 2019-01-11 RX ORDER — LORAZEPAM 2 MG/1
TABLET ORAL
Qty: 60 TABLET | Refills: 0 | Status: SHIPPED | OUTPATIENT
Start: 2019-01-11 | End: 2019-02-01 | Stop reason: SDUPTHER

## 2019-02-01 ENCOUNTER — OFFICE VISIT (OUTPATIENT)
Dept: CARDIOLOGY | Facility: CLINIC | Age: 64
End: 2019-02-01

## 2019-02-01 VITALS
HEART RATE: 70 BPM | SYSTOLIC BLOOD PRESSURE: 142 MMHG | DIASTOLIC BLOOD PRESSURE: 80 MMHG | BODY MASS INDEX: 40.04 KG/M2 | HEIGHT: 71 IN | WEIGHT: 286 LBS

## 2019-02-01 DIAGNOSIS — I25.10 CORONARY ARTERY DISEASE INVOLVING NATIVE CORONARY ARTERY OF NATIVE HEART WITHOUT ANGINA PECTORIS: Primary | ICD-10-CM

## 2019-02-01 DIAGNOSIS — Z95.0 PACEMAKER: ICD-10-CM

## 2019-02-01 DIAGNOSIS — R00.1 SYMPTOMATIC BRADYCARDIA: ICD-10-CM

## 2019-02-01 DIAGNOSIS — I10 ESSENTIAL HYPERTENSION: ICD-10-CM

## 2019-02-01 PROCEDURE — 99213 OFFICE O/P EST LOW 20 MIN: CPT | Performed by: NURSE PRACTITIONER

## 2019-02-01 PROCEDURE — 93280 PM DEVICE PROGR EVAL DUAL: CPT | Performed by: NURSE PRACTITIONER
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
## 2019-02-01 NOTE — TELEPHONE ENCOUNTER
Patient's wife is calling in early and wanted to see if they could have medication refilled at time needed they will not be able to make his appointment on 2/12/19 due to him having a mass removed from his adrenal gland. His appointment is rescheduled for the next available time March 28.

## 2019-02-04 RX ORDER — DULOXETIN HYDROCHLORIDE 60 MG/1
CAPSULE, DELAYED RELEASE ORAL
Qty: 60 CAPSULE | Refills: 2 | Status: SHIPPED | OUTPATIENT
Start: 2019-02-04 | End: 2019-03-28 | Stop reason: SDUPTHER

## 2019-02-04 RX ORDER — LORAZEPAM 2 MG/1
TABLET ORAL
Qty: 60 TABLET | Refills: 0 | Status: SHIPPED | OUTPATIENT
Start: 2019-02-04 | End: 2019-03-28 | Stop reason: SDUPTHER

## 2019-02-04 RX ORDER — DOXEPIN HYDROCHLORIDE 25 MG/1
50 CAPSULE ORAL NIGHTLY
Qty: 60 CAPSULE | Refills: 0 | Status: SHIPPED | OUTPATIENT
Start: 2019-02-04 | End: 2019-03-28 | Stop reason: SDUPTHER

## 2019-02-04 RX ORDER — TRAZODONE HYDROCHLORIDE 100 MG/1
TABLET ORAL
Qty: 60 TABLET | Refills: 1 | Status: SHIPPED | OUTPATIENT
Start: 2019-02-04 | End: 2019-03-28 | Stop reason: SDUPTHER

## 2019-02-12 ENCOUNTER — OFFICE VISIT (OUTPATIENT)
Dept: CARDIOLOGY | Facility: CLINIC | Age: 64
End: 2019-02-12

## 2019-02-12 VITALS
WEIGHT: 291 LBS | BODY MASS INDEX: 40.74 KG/M2 | SYSTOLIC BLOOD PRESSURE: 131 MMHG | HEIGHT: 71 IN | HEART RATE: 70 BPM | DIASTOLIC BLOOD PRESSURE: 84 MMHG

## 2019-02-12 DIAGNOSIS — E78.5 DYSLIPIDEMIA: ICD-10-CM

## 2019-02-12 DIAGNOSIS — I10 ESSENTIAL HYPERTENSION: Primary | ICD-10-CM

## 2019-02-12 DIAGNOSIS — I25.10 CORONARY ARTERY DISEASE INVOLVING NATIVE CORONARY ARTERY OF NATIVE HEART WITHOUT ANGINA PECTORIS: ICD-10-CM

## 2019-02-12 PROCEDURE — 99214 OFFICE O/P EST MOD 30 MIN: CPT | Performed by: INTERNAL MEDICINE

## 2019-02-12 PROCEDURE — 93000 ELECTROCARDIOGRAM COMPLETE: CPT | Performed by: INTERNAL MEDICINE
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
## 2019-03-28 ENCOUNTER — OFFICE VISIT (OUTPATIENT)
Dept: PSYCHIATRY | Facility: CLINIC | Age: 64
End: 2019-03-28

## 2019-03-28 VITALS
DIASTOLIC BLOOD PRESSURE: 87 MMHG | HEART RATE: 73 BPM | HEIGHT: 71 IN | SYSTOLIC BLOOD PRESSURE: 130 MMHG | WEIGHT: 277 LBS | BODY MASS INDEX: 38.78 KG/M2

## 2019-03-28 DIAGNOSIS — F10.21 ALCOHOL DEPENDENCE IN REMISSION (HCC): ICD-10-CM

## 2019-03-28 DIAGNOSIS — F33.40 MAJOR DEPRESSIVE DISORDER, RECURRENT, IN REMISSION (HCC): Primary | ICD-10-CM

## 2019-03-28 PROCEDURE — 99213 OFFICE O/P EST LOW 20 MIN: CPT | Performed by: PSYCHIATRY & NEUROLOGY

## 2019-03-28 RX ORDER — DOXEPIN HYDROCHLORIDE 25 MG/1
50 CAPSULE ORAL NIGHTLY
Qty: 60 CAPSULE | Refills: 3 | Status: SHIPPED | OUTPATIENT
Start: 2019-03-28 | End: 2019-07-15 | Stop reason: SDUPTHER

## 2019-03-28 RX ORDER — DOXEPIN HYDROCHLORIDE 25 MG/1
50 CAPSULE ORAL NIGHTLY
Qty: 60 CAPSULE | Refills: 0 | Status: SHIPPED | OUTPATIENT
Start: 2019-03-28 | End: 2019-03-28 | Stop reason: SDUPTHER

## 2019-03-28 RX ORDER — TRAZODONE HYDROCHLORIDE 100 MG/1
TABLET ORAL
Qty: 60 TABLET | Refills: 1 | Status: SHIPPED | OUTPATIENT
Start: 2019-03-28 | End: 2019-03-28 | Stop reason: SDUPTHER

## 2019-03-28 RX ORDER — CHOLECALCIFEROL (VITAMIN D3) 125 MCG
5 CAPSULE ORAL NIGHTLY
Qty: 100 TABLET | Refills: 0 | Status: SHIPPED | OUTPATIENT
Start: 2019-03-28 | End: 2019-03-28 | Stop reason: SDUPTHER

## 2019-03-28 RX ORDER — DULOXETIN HYDROCHLORIDE 60 MG/1
CAPSULE, DELAYED RELEASE ORAL
Qty: 60 CAPSULE | Refills: 2 | Status: SHIPPED | OUTPATIENT
Start: 2019-03-28 | End: 2019-03-28 | Stop reason: SDUPTHER

## 2019-03-28 RX ORDER — LORAZEPAM 2 MG/1
TABLET ORAL
Qty: 60 TABLET | Refills: 3 | Status: SHIPPED | OUTPATIENT
Start: 2019-03-28 | End: 2019-07-15 | Stop reason: SDUPTHER

## 2019-03-28 RX ORDER — LORAZEPAM 2 MG/1
TABLET ORAL
Qty: 60 TABLET | Refills: 0 | Status: SHIPPED | OUTPATIENT
Start: 2019-03-28 | End: 2019-03-28 | Stop reason: SDUPTHER

## 2019-03-28 RX ORDER — CHOLECALCIFEROL (VITAMIN D3) 125 MCG
5 CAPSULE ORAL NIGHTLY
Qty: 100 TABLET | Refills: 0 | Status: SHIPPED | OUTPATIENT
Start: 2019-03-28 | End: 2020-04-13

## 2019-03-28 RX ORDER — TRAZODONE HYDROCHLORIDE 100 MG/1
TABLET ORAL
Qty: 60 TABLET | Refills: 3 | Status: SHIPPED | OUTPATIENT
Start: 2019-03-28 | End: 2019-07-15

## 2019-03-28 RX ORDER — DULOXETIN HYDROCHLORIDE 60 MG/1
CAPSULE, DELAYED RELEASE ORAL
Qty: 60 CAPSULE | Refills: 3 | Status: SHIPPED | OUTPATIENT
Start: 2019-03-28 | End: 2019-07-15 | Stop reason: SDUPTHER

## 2019-03-28 NOTE — PROGRESS NOTES
"Patient ID: Cecilio Fierro is a 64 y.o. male    SERVICE TYPE: EVALUATION AND MANAGEMENT (greater than 50% of the time spent for supportive psychotherapy).      /87   Pulse 73   Ht 180.3 cm (71\")   Wt 126 kg (277 lb)   BMI 38.63 kg/m²     ALLERGIES:  Zofran [ondansetron hcl]    CC/ Focus of the visit: depression/ anxiety/ ETOH in remission.      HPI: Brother  last week, sister last October, both with CNS CA. Not sleeping, restless, agitated, irritable, no hopelessness, no drinking, no thoughts of suicide. \"Just can't be still, not hateful or mean\".   Seen with his wife.   Enjoying the grandson.   So a lot going on medically, from a psychiatric perspective fairly stable.    Not taking any opiate pain medications.     PFSH: Home situation stable, wife is supportive, very proud of the when he 3-year-old grandson.    Review of Systems   Respiratory: Positive for shortness of breath.    Cardiovascular: Negative.    Gastrointestinal: Positive for abdominal pain.      Recent surgery - adrenal gland pathology.   Question of Hepatic tumor (path pending).   BPH plus testicle tumor - will be seeing a Urologist.     Weight down 14 lbs this past month.     SUPPORTIVE PSYCHOTHERAPY: continuing efforts to promote the therapeutic alliance, address the patient’s issues, and strengthen self awareness, insights, and coping skills.       Mental Status Exam  Appearance:  clean and casually dressed, appropriate  Attitude toward clinician:  cooperative and agreeable   Speech:    Rate:  regular rate and rhythm   Volume: normal  Motor:  no abnormal movements present  Mood:  Good  Affect:  euthymic  Thought Processes:  linear, logical, and goal directed  Thought Content:  Normal   Feeling Hopeless: absent  Suicidal Thoughts:  absent  Homicidal Thoughts:  absent  Perceptual Disturbance: no perceptual disturbance  Attention and Concentration:  good  Insight and Judgement:  good  Memory:  memory appears to be intact    LABS: No " results found for this or any previous visit (from the past 168 hour(s)).    MEDICATION ISSUES: Have discussed with the patient the medications Risks, Benefits, and Side effects including potential falls, possible impaired driving and  metabolic adversities among others. No medication side effects or related complaints today.     TREATMENT PLAN/GOALS: Continue supportive psychotherapy efforts and medications as indicated.     Current Outpatient Medications   Medication Sig Dispense Refill   • aspirin 325 MG tablet Take 325 mg by mouth Daily.     • benzonatate (TESSALON) 200 MG capsule Take 200 mg by mouth 3 (Three) Times a Day As Needed for Cough.     • bumetanide (BUMEX) 1 MG tablet Take 1 mg by mouth 2 (Two) Times a Day.     • cetirizine (zyrTEC) 10 MG tablet Take 10 mg by mouth Daily.     • dicyclomine (BENTYL) 10 MG capsule Take 10 mg by mouth 3 (Three) Times a Day As Needed.     • doxepin (SINEquan) 25 MG capsule Take 2 capsules by mouth Every Night. 60 capsule 3   • DULoxetine (CYMBALTA) 60 MG capsule Take one bid. 60 capsule 3   • folic acid (FOLVITE) 1 MG tablet Take 1 mg by mouth Daily.     • lisinopril (PRINIVIL,ZESTRIL) 10 MG tablet Take 10 mg by mouth Daily.     • LORazepam (ATIVAN) 2 MG tablet One half or 1 twice a day when necessary anxiety 60 tablet 3   • methocarbamol (ROBAXIN) 750 MG tablet Take 750 mg by mouth 4 (Four) Times a Day As Needed.     • metoprolol tartrate (LOPRESSOR) 100 MG tablet Take 100 mg by mouth 2 (Two) Times a Day.     • montelukast (SINGULAIR) 10 MG tablet Take 10 mg by mouth Every Night.     • nitroglycerin (NITROSTAT) 0.4 MG SL tablet Place 0.4 mg under the tongue Every 5 (Five) Minutes As Needed for Chest Pain.     • omeprazole (priLOSEC) 40 MG capsule Take 40 mg by mouth Daily.     • pravastatin (PRAVACHOL) 40 MG tablet Take 40 mg by mouth Daily.     • promethazine (PHENERGAN) 25 MG tablet Take 25 mg by mouth Every 6 (Six) Hours As Needed for Nausea or Vomiting.     •  raNITIdine (ZANTAC) 300 MG tablet Take 300 mg by mouth 2 (Two) Times a Day.     • rOPINIRole (REQUIP) 3 MG tablet Take 3 mg by mouth Every Night. Take 1 hour before bedtime.      • traZODone (DESYREL) 100 MG tablet TAKE TWO TABLETS BY MOUTH EVERY DAY AT BEDTIME FOR SLEEP 60 tablet 3   • DilTIAZem HCl Coated Beads (DILTIAZEM CD PO) Take 360 mg by mouth Daily.     • melatonin 5 MG tablet tablet Take 1 tablet by mouth Every Night. 100 tablet 0   • potassium chloride (K-DUR,KLOR-CON) 20 MEQ CR tablet Take 20 mEq by mouth. 24 pills  8 morning 8 lunch 8 evening       No current facility-administered medications for this visit.        COLLATERAL PSYCHOTHERAPEUTIC INTERVENTION:  patient not interested in additional psychotherapy.    RISK:  Moderate.     Assessment/Plan     Diagnoses and all orders for this visit:    Major depressive disorder, recurrent, in remission (CMS/HCC)    Alcohol dependence in remission (CMS/HCC)    Other orders  -     Discontinue: LORazepam (ATIVAN) 2 MG tablet; One half or 1 twice a day when necessary anxiety  -     Discontinue: traZODone (DESYREL) 100 MG tablet; TAKE TWO TABLETS BY MOUTH EVERY DAY AT BEDTIME FOR SLEEP  -     Discontinue: DULoxetine (CYMBALTA) 60 MG capsule; Take one bid.  -     Discontinue: doxepin (SINEquan) 25 MG capsule; Take 2 capsules by mouth Every Night.  -     Discontinue: melatonin 5 MG tablet tablet; Take 1 tablet by mouth Every Night.  -     doxepin (SINEquan) 25 MG capsule; Take 2 capsules by mouth Every Night.  -     DULoxetine (CYMBALTA) 60 MG capsule; Take one bid.  -     traZODone (DESYREL) 100 MG tablet; TAKE TWO TABLETS BY MOUTH EVERY DAY AT BEDTIME FOR SLEEP  -     melatonin 5 MG tablet tablet; Take 1 tablet by mouth Every Night.  -     LORazepam (ATIVAN) 2 MG tablet; One half or 1 twice a day when necessary anxiety        Return in about 4 months (around 7/28/2019).         Patient knows to call if symptoms worsen or fail to improve between  appointments.    Dictated utilizing Roberto dictation    RICARDO Almonte MD

## 2019-04-03 ENCOUNTER — CLINICAL SUPPORT NO REQUIREMENTS (OUTPATIENT)
Dept: CARDIOLOGY | Facility: CLINIC | Age: 64
End: 2019-04-03

## 2019-04-03 DIAGNOSIS — R00.1 SYMPTOMATIC BRADYCARDIA: ICD-10-CM

## 2019-04-03 PROCEDURE — 93294 REM INTERROG EVL PM/LDLS PM: CPT | Performed by: INTERNAL MEDICINE

## 2019-04-03 PROCEDURE — 93296 REM INTERROG EVL PM/IDS: CPT | Performed by: INTERNAL MEDICINE

## 2019-04-13 ENCOUNTER — CLINICAL SUPPORT NO REQUIREMENTS (OUTPATIENT)
Dept: CARDIOLOGY | Facility: CLINIC | Age: 64
End: 2019-04-13

## 2019-04-13 DIAGNOSIS — R00.1 BRADYCARDIA, SINUS: Primary | ICD-10-CM

## 2019-05-23 ENCOUNTER — CLINICAL SUPPORT NO REQUIREMENTS (OUTPATIENT)
Dept: CARDIOLOGY | Facility: CLINIC | Age: 64
End: 2019-05-23

## 2019-05-23 DIAGNOSIS — R00.1 BRADYCARDIA: Primary | ICD-10-CM

## 2019-05-31 ENCOUNTER — HOSPITAL ENCOUNTER (OUTPATIENT)
Dept: GENERAL RADIOLOGY | Facility: HOSPITAL | Age: 64
Discharge: HOME | End: 2019-05-31
Admitting: FAMILY MEDICINE

## 2019-05-31 ENCOUNTER — HOSPITAL ENCOUNTER (OUTPATIENT)
Dept: GENERAL RADIOLOGY | Facility: HOSPITAL | Age: 64
Discharge: HOME | End: 2019-05-31

## 2019-05-31 ENCOUNTER — TRANSCRIBE ORDERS (OUTPATIENT)
Dept: LAB | Facility: HOSPITAL | Age: 64
End: 2019-05-31

## 2019-05-31 DIAGNOSIS — M25.562 LEFT KNEE PAIN, UNSPECIFIED CHRONICITY: ICD-10-CM

## 2019-05-31 DIAGNOSIS — M25.512 LEFT SHOULDER PAIN, UNSPECIFIED CHRONICITY: ICD-10-CM

## 2019-05-31 DIAGNOSIS — M25.512 LEFT SHOULDER PAIN, UNSPECIFIED CHRONICITY: Primary | ICD-10-CM

## 2019-05-31 PROCEDURE — 73030 X-RAY EXAM OF SHOULDER: CPT | Performed by: RADIOLOGY

## 2019-05-31 PROCEDURE — 73564 X-RAY EXAM KNEE 4 OR MORE: CPT

## 2019-05-31 PROCEDURE — 73030 X-RAY EXAM OF SHOULDER: CPT

## 2019-05-31 PROCEDURE — 73564 X-RAY EXAM KNEE 4 OR MORE: CPT | Performed by: RADIOLOGY

## 2019-07-03 ENCOUNTER — CLINICAL SUPPORT NO REQUIREMENTS (OUTPATIENT)
Dept: CARDIOLOGY | Facility: CLINIC | Age: 64
End: 2019-07-03

## 2019-07-03 DIAGNOSIS — R00.1 SYMPTOMATIC BRADYCARDIA: ICD-10-CM

## 2019-07-03 PROCEDURE — 93294 REM INTERROG EVL PM/LDLS PM: CPT | Performed by: INTERNAL MEDICINE

## 2019-07-03 PROCEDURE — 93296 REM INTERROG EVL PM/IDS: CPT | Performed by: INTERNAL MEDICINE

## 2019-07-15 ENCOUNTER — OFFICE VISIT (OUTPATIENT)
Dept: PSYCHIATRY | Facility: CLINIC | Age: 64
End: 2019-07-15

## 2019-07-15 VITALS
DIASTOLIC BLOOD PRESSURE: 78 MMHG | HEIGHT: 71 IN | WEIGHT: 257.2 LBS | SYSTOLIC BLOOD PRESSURE: 115 MMHG | BODY MASS INDEX: 36.01 KG/M2 | HEART RATE: 76 BPM

## 2019-07-15 DIAGNOSIS — F10.21 ALCOHOL DEPENDENCE IN REMISSION (HCC): ICD-10-CM

## 2019-07-15 DIAGNOSIS — F33.40 MAJOR DEPRESSIVE DISORDER, RECURRENT, IN REMISSION (HCC): Primary | ICD-10-CM

## 2019-07-15 PROCEDURE — 99213 OFFICE O/P EST LOW 20 MIN: CPT | Performed by: PSYCHIATRY & NEUROLOGY

## 2019-07-15 RX ORDER — TAMSULOSIN HYDROCHLORIDE 0.4 MG/1
1 CAPSULE ORAL DAILY
COMMUNITY
Start: 2019-06-18 | End: 2023-01-30

## 2019-07-15 RX ORDER — DULOXETIN HYDROCHLORIDE 60 MG/1
CAPSULE, DELAYED RELEASE ORAL
Qty: 60 CAPSULE | Refills: 3 | Status: SHIPPED | OUTPATIENT
Start: 2019-07-15 | End: 2019-11-05 | Stop reason: SDUPTHER

## 2019-07-15 RX ORDER — MOMETASONE FUROATE AND FORMOTEROL FUMARATE DIHYDRATE 200; 5 UG/1; UG/1
2 AEROSOL RESPIRATORY (INHALATION)
COMMUNITY
Start: 2019-07-02 | End: 2020-06-09

## 2019-07-15 RX ORDER — GABAPENTIN 400 MG/1
400 CAPSULE ORAL 3 TIMES DAILY
COMMUNITY
Start: 2019-07-02 | End: 2023-01-30

## 2019-07-15 RX ORDER — TRAZODONE HYDROCHLORIDE 150 MG/1
150 TABLET ORAL NIGHTLY
Qty: 30 TABLET | Refills: 3 | Status: SHIPPED | OUTPATIENT
Start: 2019-07-15 | End: 2019-11-05 | Stop reason: SDUPTHER

## 2019-07-15 RX ORDER — LORAZEPAM 2 MG/1
TABLET ORAL
Qty: 46 TABLET | Refills: 3 | Status: SHIPPED | OUTPATIENT
Start: 2019-07-15 | End: 2019-11-05 | Stop reason: SDUPTHER

## 2019-07-15 RX ORDER — DOXEPIN HYDROCHLORIDE 25 MG/1
50 CAPSULE ORAL NIGHTLY
Qty: 60 CAPSULE | Refills: 3 | Status: SHIPPED | OUTPATIENT
Start: 2019-07-15 | End: 2019-11-05 | Stop reason: SDUPTHER

## 2019-07-15 NOTE — PROGRESS NOTES
"Patient ID: Cecilio Fierro is a 64 y.o. male    SERVICE TYPE: EVALUATION AND MANAGEMENT (greater than 50% of the time spent for supportive psychotherapy).      /78   Pulse 76   Ht 180.3 cm (71\")   Wt 117 kg (257 lb 3.2 oz)   BMI 35.87 kg/m²     ALLERGIES:  Zofran [ondansetron hcl]    CC/ Focus of the visit: depression/ anxiety.      HPI: Not been depressed, not irritable, sleeping normally, not particularly anxious. Activities back to normal.   Agrees to taper the Lorazepam dose, no history suggestive of Rx abuse or misuse. No ETOH.     PFSH:Home life stable, enjoys the 3 y/o grandson.     Review of Systems   Respiratory: Negative.    Cardiovascular: Negative.    Gastrointestinal: Negative.    Musculoskeletal:        RLS - resolved with the Neurontin.    Continues to have pain left knee and anticipating surgery. Right knee s/p surgery.   Considering implant for his chronic back pain in August.     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 Does take a diuretic. \"More energy, staying busy, feel like a different person\".       SUPPORTIVE PSYCHOTHERAPY: continuing efforts to promote the therapeutic alliance, address the patient’s issues, and strengthen self awareness, insights, and coping skills.       Mental Status Exam  Appearance:  clean and casually dressed, appropriate  Attitude toward clinician:  cooperative and agreeable   Speech:    Rate:  regular rate and rhythm   Volume: normal  Motor:  no abnormal movements present  Mood:  Good  Affect:  euthymic  Thought Processes:  linear, logical, and goal directed  Thought Content:  Normal   Feeling Hopeless: absent  Suicidal Thoughts:  absent  Homicidal Thoughts:  absent  Perceptual Disturbance: no perceptual disturbance  Attention and Concentration:  good  Insight and Judgement:  good  Memory:  memory appears to be intact    LABS: No results found for this or any previous visit (from the past 168 " hour(s)).    MEDICATION ISSUES: Have discussed with the patient the medications Risks, Benefits, and Side effects including potential falls, possible impaired driving and  metabolic adversities among others. No medication side effects or related complaints today.     TREATMENT PLAN/GOALS: Continue supportive psychotherapy efforts and medications as indicated.     Current Outpatient Medications   Medication Sig Dispense Refill   • aspirin 325 MG tablet Take 325 mg by mouth Daily.     • benzonatate (TESSALON) 200 MG capsule Take 200 mg by mouth 3 (Three) Times a Day As Needed for Cough.     • bumetanide (BUMEX) 1 MG tablet Take 1 mg by mouth 2 (Two) Times a Day.     • cetirizine (zyrTEC) 10 MG tablet Take 10 mg by mouth Daily.     • dicyclomine (BENTYL) 10 MG capsule Take 10 mg by mouth 3 (Three) Times a Day As Needed.     • DilTIAZem HCl Coated Beads (DILTIAZEM CD PO) Take 360 mg by mouth Daily.     • doxepin (SINEquan) 25 MG capsule Take 2 capsules by mouth Every Night. 60 capsule 3   • DULERA 200-5 MCG/ACT inhaler      • DULoxetine (CYMBALTA) 60 MG capsule Take one bid. 60 capsule 3   • folic acid (FOLVITE) 1 MG tablet Take 1 mg by mouth Daily.     • gabapentin (NEURONTIN) 400 MG capsule      • LORazepam (ATIVAN) 2 MG tablet One half or 1 twice a day when necessary anxiety 46 tablet 3   • methocarbamol (ROBAXIN) 750 MG tablet Take 750 mg by mouth 4 (Four) Times a Day As Needed.     • montelukast (SINGULAIR) 10 MG tablet Take 10 mg by mouth Every Night.     • nitroglycerin (NITROSTAT) 0.4 MG SL tablet Place 0.4 mg under the tongue Every 5 (Five) Minutes As Needed for Chest Pain.     • omeprazole (priLOSEC) 40 MG capsule Take 40 mg by mouth Daily.     • pravastatin (PRAVACHOL) 40 MG tablet Take 40 mg by mouth Daily.     • promethazine (PHENERGAN) 25 MG tablet Take 25 mg by mouth Every 6 (Six) Hours As Needed for Nausea or Vomiting.     • raNITIdine (ZANTAC) 300 MG tablet Take 300 mg by mouth 2 (Two) Times a Day.      • rOPINIRole (REQUIP) 3 MG tablet Take 3 mg by mouth Every Night. Take 1 hour before bedtime.      • tamsulosin (FLOMAX) 0.4 MG capsule 24 hr capsule      • lisinopril (PRINIVIL,ZESTRIL) 10 MG tablet Take 10 mg by mouth Daily.     • melatonin 5 MG tablet tablet Take 1 tablet by mouth Every Night.     • metoprolol tartrate (LOPRESSOR) 100 MG tablet Take 100 mg by mouth 2 (Two) Times a Day.     • potassium chloride (K-DUR,KLOR-CON) 20 MEQ CR tablet Take 20 mEq by mouth. 24 pills  8 morning 8 lunch 8 evening     • traZODone (DESYREL) 150 MG tablet Take 1 tablet by mouth Every Night. 30 tablet 3     No current facility-administered medications for this visit.        COLLATERAL PSYCHOTHERAPEUTIC INTERVENTION:  patient not interested in additional psychotherapy.    RISK:  moderate    Assessment/Plan     Diagnoses and all orders for this visit:    Major depressive disorder, recurrent, in remission (CMS/HCC)    Alcohol dependence in remission (CMS/HCC)    Other orders  -     LORazepam (ATIVAN) 2 MG tablet; One half or 1 twice a day when necessary anxiety  -     DULoxetine (CYMBALTA) 60 MG capsule; Take one bid.  -     doxepin (SINEquan) 25 MG capsule; Take 2 capsules by mouth Every Night.  -     traZODone (DESYREL) 150 MG tablet; Take 1 tablet by mouth Every Night.        Return in about 4 months (around 11/15/2019).         Patient knows to call if symptoms worsen or fail to improve between appointments.    Dictated utilizing Dragon dictation    RICARDO Almonte MD

## 2019-08-05 RX ORDER — TRAZODONE HYDROCHLORIDE 100 MG/1
TABLET ORAL
Qty: 60 TABLET | Refills: 3 | OUTPATIENT
Start: 2019-08-05

## 2019-08-14 ENCOUNTER — TELEPHONE (OUTPATIENT)
Dept: CARDIOLOGY | Facility: CLINIC | Age: 64
End: 2019-08-14

## 2019-08-14 NOTE — TELEPHONE ENCOUNTER
Called to check in with pt regarding fast heart rates yesterday.  His wife said she thinks he over did himself yesterday.  He was working with a [employer] and he doesn't feel the best today.  His presenting rhythm is AP-VS 70 at time of transmission.  He has a f/u appt with Angelique this Friday.  Encouraged to keep appt.

## 2019-08-16 ENCOUNTER — OFFICE VISIT (OUTPATIENT)
Dept: CARDIOLOGY | Facility: CLINIC | Age: 64
End: 2019-08-16

## 2019-08-16 VITALS
SYSTOLIC BLOOD PRESSURE: 122 MMHG | HEIGHT: 71 IN | WEIGHT: 256 LBS | DIASTOLIC BLOOD PRESSURE: 84 MMHG | HEART RATE: 71 BPM | BODY MASS INDEX: 35.84 KG/M2

## 2019-08-16 DIAGNOSIS — Z95.0 PACEMAKER: ICD-10-CM

## 2019-08-16 DIAGNOSIS — R00.1 SYMPTOMATIC BRADYCARDIA: ICD-10-CM

## 2019-08-16 DIAGNOSIS — R00.0 TACHYCARDIA: Primary | ICD-10-CM

## 2019-08-16 DIAGNOSIS — I25.10 CORONARY ARTERY DISEASE INVOLVING NATIVE CORONARY ARTERY OF NATIVE HEART WITHOUT ANGINA PECTORIS: ICD-10-CM

## 2019-08-16 DIAGNOSIS — I10 ESSENTIAL HYPERTENSION: ICD-10-CM

## 2019-08-16 PROCEDURE — 99213 OFFICE O/P EST LOW 20 MIN: CPT | Performed by: NURSE PRACTITIONER

## 2019-08-16 PROCEDURE — 93280 PM DEVICE PROGR EVAL DUAL: CPT | Performed by: NURSE PRACTITIONER
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
## 2019-10-02 ENCOUNTER — CLINICAL SUPPORT NO REQUIREMENTS (OUTPATIENT)
Dept: CARDIOLOGY | Facility: CLINIC | Age: 64
End: 2019-10-02

## 2019-10-02 DIAGNOSIS — R00.1 SYMPTOMATIC BRADYCARDIA: Primary | ICD-10-CM

## 2019-10-02 DIAGNOSIS — R00.0 TACHYCARDIA: ICD-10-CM

## 2019-10-02 DIAGNOSIS — I25.10 CORONARY ARTERY DISEASE, ANGINA PRESENCE UNSPECIFIED, UNSPECIFIED VESSEL OR LESION TYPE, UNSPECIFIED WHETHER NATIVE OR TRANSPLANTED HEART: ICD-10-CM

## 2019-10-02 PROCEDURE — 93294 REM INTERROG EVL PM/LDLS PM: CPT | Performed by: INTERNAL MEDICINE

## 2019-10-02 PROCEDURE — 93296 REM INTERROG EVL PM/IDS: CPT | Performed by: INTERNAL MEDICINE

## 2019-10-11 ENCOUNTER — TRANSCRIBE ORDERS (OUTPATIENT)
Dept: ADMINISTRATIVE | Facility: HOSPITAL | Age: 64
End: 2019-10-11

## 2019-10-11 DIAGNOSIS — Z87.891 PERSONAL HISTORY OF TOBACCO USE, PRESENTING HAZARDS TO HEALTH: Primary | ICD-10-CM

## 2019-10-16 ENCOUNTER — HOSPITAL ENCOUNTER (OUTPATIENT)
Dept: CT IMAGING | Facility: HOSPITAL | Age: 64
Discharge: HOME | End: 2019-10-16
Admitting: FAMILY MEDICINE

## 2019-10-16 DIAGNOSIS — Z87.891 PERSONAL HISTORY OF TOBACCO USE, PRESENTING HAZARDS TO HEALTH: ICD-10-CM

## 2019-10-16 PROCEDURE — 71250 CT THORAX DX C-: CPT | Performed by: RADIOLOGY

## 2019-10-16 PROCEDURE — G0297 LDCT FOR LUNG CA SCREEN: HCPCS

## 2019-10-17 ENCOUNTER — TELEPHONE (OUTPATIENT)
Dept: GENERAL RADIOLOGY | Facility: HOSPITAL | Age: 64
End: 2019-10-17

## 2019-10-28 ENCOUNTER — CLINICAL SUPPORT NO REQUIREMENTS (OUTPATIENT)
Dept: CARDIOLOGY | Facility: CLINIC | Age: 64
End: 2019-10-28

## 2019-10-28 DIAGNOSIS — R00.1 BRADYCARDIA, SINUS: Primary | ICD-10-CM

## 2019-10-31 RX ORDER — TRAZODONE HYDROCHLORIDE 150 MG/1
TABLET ORAL
Qty: 30 TABLET | Refills: 3 | OUTPATIENT
Start: 2019-10-31

## 2019-11-05 ENCOUNTER — OFFICE VISIT (OUTPATIENT)
Dept: PSYCHIATRY | Facility: CLINIC | Age: 64
End: 2019-11-05

## 2019-11-05 VITALS
HEIGHT: 71 IN | DIASTOLIC BLOOD PRESSURE: 79 MMHG | BODY MASS INDEX: 36.76 KG/M2 | HEART RATE: 80 BPM | SYSTOLIC BLOOD PRESSURE: 124 MMHG | WEIGHT: 262.6 LBS

## 2019-11-05 DIAGNOSIS — F33.40 RECURRENT MAJOR DEPRESSIVE DISORDER, IN REMISSION (HCC): Primary | ICD-10-CM

## 2019-11-05 DIAGNOSIS — F10.11 ALCOHOL ABUSE, IN REMISSION: ICD-10-CM

## 2019-11-05 PROCEDURE — 99213 OFFICE O/P EST LOW 20 MIN: CPT | Performed by: PSYCHIATRY & NEUROLOGY

## 2019-11-05 RX ORDER — TRAZODONE HYDROCHLORIDE 150 MG/1
150 TABLET ORAL NIGHTLY
Qty: 30 TABLET | Refills: 3 | Status: SHIPPED | OUTPATIENT
Start: 2019-11-05 | End: 2020-02-25 | Stop reason: SDUPTHER

## 2019-11-05 RX ORDER — CYCLOBENZAPRINE HCL 10 MG
10 TABLET ORAL 2 TIMES DAILY PRN
COMMUNITY
Start: 2019-10-01 | End: 2020-04-13

## 2019-11-05 RX ORDER — DULOXETIN HYDROCHLORIDE 60 MG/1
CAPSULE, DELAYED RELEASE ORAL
Qty: 60 CAPSULE | Refills: 3 | Status: SHIPPED | OUTPATIENT
Start: 2019-11-05 | End: 2020-02-25 | Stop reason: SDUPTHER

## 2019-11-05 RX ORDER — DOXEPIN HYDROCHLORIDE 25 MG/1
50 CAPSULE ORAL NIGHTLY
Qty: 60 CAPSULE | Refills: 3 | Status: SHIPPED | OUTPATIENT
Start: 2019-11-05 | End: 2020-02-25 | Stop reason: SDUPTHER

## 2019-11-05 RX ORDER — LORAZEPAM 2 MG/1
TABLET ORAL
Qty: 46 TABLET | Refills: 3 | Status: SHIPPED | OUTPATIENT
Start: 2019-11-05 | End: 2020-02-25 | Stop reason: SDUPTHER

## 2019-11-05 NOTE — PROGRESS NOTES
"Patient ID: Cecilio Fierro is a 64 y.o. male    SERVICE TYPE: EVALUATION AND MANAGEMENT (greater than 50% of the time spent for supportive psychotherapy).      /79   Pulse 80   Ht 180.3 cm (71\")   Wt 119 kg (262 lb 9.6 oz)   BMI 36.63 kg/m²     ALLERGIES:  Zofran [ondansetron hcl]    CC/ Focus of the visit: depression/ ETOH.      HPI: No major set backs emotionally, not been significantly depressed. Trying to reduce his utilization of the BZ, currently taking 1-2 mg per day (no ETOH since 2015). Has been active with his usual persuit and enjoying the grandson. Sleeping \"with the medicine\".       PFSH: home life stable.     Review of Systems   Respiratory: Negative.    Cardiovascular: Negative.    Gastrointestinal:        GERD   Musculoskeletal: Positive for back pain.   Might be canidate for a pain pump.     SUPPORTIVE PSYCHOTHERAPY: continuing efforts to promote the therapeutic alliance, address the patient’s issues, and strengthen self awareness, insights, and coping skills.       Mental Status Exam  Appearance:  clean and casually dressed, appropriate  Attitude toward clinician:  cooperative and agreeable   Speech:    Rate:  regular rate and rhythm   Volume: normal  Motor:  no abnormal movements present  Mood:  Good  Affect:  euthymic  Thought Processes:  linear, logical, and goal directed  Thought Content:  Normal   Feeling Hopeless: absent  Suicidal Thoughts:  absent  Homicidal Thoughts:  absent  Perceptual Disturbance: no perceptual disturbance  Attention and Concentration:  good  Insight and Judgement:  good  Memory:  memory appears to be intact    LABS: No results found for this or any previous visit (from the past 168 hour(s)).    MEDICATION ISSUES: Have discussed with the patient the medications Risks, Benefits, and Side effects including potential falls, possible impaired driving and  metabolic adversities among others. No medication side effects or related complaints today.     TREATMENT " PLAN/GOALS: Continue supportive psychotherapy efforts and medications as indicated.     Current Outpatient Medications   Medication Sig Dispense Refill   • aspirin 325 MG tablet Take 325 mg by mouth Daily.     • benzonatate (TESSALON) 200 MG capsule Take 200 mg by mouth 3 (Three) Times a Day As Needed for Cough.     • bumetanide (BUMEX) 1 MG tablet Take 1 mg by mouth 2 (Two) Times a Day.     • cetirizine (zyrTEC) 10 MG tablet Take 10 mg by mouth Daily.     • cyclobenzaprine (FLEXERIL) 10 MG tablet      • dicyclomine (BENTYL) 10 MG capsule Take 10 mg by mouth 3 (Three) Times a Day As Needed.     • DilTIAZem HCl Coated Beads (DILTIAZEM CD PO) Take 360 mg by mouth Daily.     • doxepin (SINEquan) 25 MG capsule Take 2 capsules by mouth Every Night. 60 capsule 3   • DULERA 200-5 MCG/ACT inhaler      • DULoxetine (CYMBALTA) 60 MG capsule Take one bid. 60 capsule 3   • folic acid (FOLVITE) 1 MG tablet Take 1 mg by mouth Daily.     • gabapentin (NEURONTIN) 400 MG capsule      • lisinopril (PRINIVIL,ZESTRIL) 10 MG tablet Take 10 mg by mouth Daily.     • LORazepam (ATIVAN) 2 MG tablet One half or 1 twice a day when necessary anxiety 46 tablet 3   • melatonin 5 MG tablet tablet Take 1 tablet by mouth Every Night. 100 tablet 0   • methocarbamol (ROBAXIN) 750 MG tablet Take 750 mg by mouth 4 (Four) Times a Day As Needed.     • metoprolol tartrate (LOPRESSOR) 25 MG tablet Take 1 tablet by mouth 2 (Two) Times a Day. 60 tablet 11   • montelukast (SINGULAIR) 10 MG tablet Take 10 mg by mouth Every Night.     • nitroglycerin (NITROSTAT) 0.4 MG SL tablet Place 0.4 mg under the tongue Every 5 (Five) Minutes As Needed for Chest Pain.     • omeprazole (priLOSEC) 40 MG capsule Take 40 mg by mouth Daily.     • potassium chloride (K-DUR,KLOR-CON) 20 MEQ CR tablet Take 20 mEq by mouth. 24 pills  8 morning 8 lunch 8 evening     • pravastatin (PRAVACHOL) 40 MG tablet Take 40 mg by mouth Daily.     • promethazine (PHENERGAN) 25 MG tablet Take 25  mg by mouth Every 6 (Six) Hours As Needed for Nausea or Vomiting.     • raNITIdine (ZANTAC) 300 MG tablet Take 300 mg by mouth 2 (Two) Times a Day.     • rOPINIRole (REQUIP) 3 MG tablet Take 3 mg by mouth Every Night. Take 1 hour before bedtime.      • tamsulosin (FLOMAX) 0.4 MG capsule 24 hr capsule      • traZODone (DESYREL) 150 MG tablet Take 1 tablet by mouth Every Night. 30 tablet 3     No current facility-administered medications for this visit.        COLLATERAL PSYCHOTHERAPEUTIC INTERVENTION:  patient not interested in additional psychotherapy.    RISK:  moderate    Assessment/Plan     Diagnoses and all orders for this visit:    Recurrent major depressive disorder, in remission (CMS/HCC)  -     LORazepam (ATIVAN) 2 MG tablet; One half or 1 twice a day when necessary anxiety  -     DULoxetine (CYMBALTA) 60 MG capsule; Take one bid.  -     doxepin (SINEquan) 25 MG capsule; Take 2 capsules by mouth Every Night.  -     traZODone (DESYREL) 150 MG tablet; Take 1 tablet by mouth Every Night.    Alcohol abuse, in remission  -     LORazepam (ATIVAN) 2 MG tablet; One half or 1 twice a day when necessary anxiety        No Follow-up on file.           Patient knows to call if symptoms worsen or fail to improve between appointments.     I spent a total of 22 minutes in direct patient care, greater than 16 minutes (greater than 50%) were spent face-to-face with assessment, coordination care, and counseling the patient regarding depression/ ETOH and answering any questions the patient had about the medication and plan..      Dictated utilizing [employer] xiomaraation    RICARDO Almonte MD

## 2020-02-25 ENCOUNTER — OFFICE VISIT (OUTPATIENT)
Dept: PSYCHIATRY | Facility: CLINIC | Age: 65
End: 2020-02-25

## 2020-02-25 VITALS
WEIGHT: 269 LBS | SYSTOLIC BLOOD PRESSURE: 162 MMHG | HEIGHT: 71 IN | HEART RATE: 80 BPM | BODY MASS INDEX: 37.66 KG/M2 | DIASTOLIC BLOOD PRESSURE: 89 MMHG

## 2020-02-25 DIAGNOSIS — F10.21 ALCOHOL DEPENDENCE IN REMISSION (HCC): ICD-10-CM

## 2020-02-25 DIAGNOSIS — Z79.899 MEDICATION MANAGEMENT: Primary | ICD-10-CM

## 2020-02-25 DIAGNOSIS — F33.40 RECURRENT MAJOR DEPRESSIVE DISORDER, IN REMISSION (HCC): ICD-10-CM

## 2020-02-25 LAB
AMPHETAMINE CUT-OFF: ABNORMAL
BENZODIAZIPINE CUT-OFF: ABNORMAL
BUPRENORPHINE CUT-OFF: ABNORMAL
COCAINE CUT-OFF: ABNORMAL
EXTERNAL AMPHETAMINE SCREEN URINE: NEGATIVE
EXTERNAL BENZODIAZEPINE SCREEN URINE: POSITIVE
EXTERNAL BUPRENORPHINE SCREEN URINE: NEGATIVE
EXTERNAL COCAINE SCREEN URINE: NEGATIVE
EXTERNAL MDMA: NEGATIVE
EXTERNAL METHADONE SCREEN URINE: NEGATIVE
EXTERNAL METHAMPHETAMINE SCREEN URINE: NEGATIVE
EXTERNAL OPIATES SCREEN URINE: NEGATIVE
EXTERNAL OXYCODONE SCREEN URINE: NEGATIVE
EXTERNAL THC SCREEN URINE: NEGATIVE
MDMA CUT-OFF: ABNORMAL
METHADONE CUT-OFF: ABNORMAL
METHAMPHETAMINE CUT-OFF: ABNORMAL
OPIATES CUT-OFF: ABNORMAL
OXYCODONE CUT-OFF: ABNORMAL
THC CUT-OFF: ABNORMAL

## 2020-02-25 PROCEDURE — 99214 OFFICE O/P EST MOD 30 MIN: CPT | Performed by: PSYCHIATRY & NEUROLOGY

## 2020-02-25 RX ORDER — DOXEPIN HYDROCHLORIDE 25 MG/1
50 CAPSULE ORAL NIGHTLY
Qty: 60 CAPSULE | Refills: 3 | Status: SHIPPED | OUTPATIENT
Start: 2020-02-25 | End: 2020-06-15 | Stop reason: SDUPTHER

## 2020-02-25 RX ORDER — DULOXETIN HYDROCHLORIDE 60 MG/1
CAPSULE, DELAYED RELEASE ORAL
Qty: 60 CAPSULE | Refills: 3 | Status: SHIPPED | OUTPATIENT
Start: 2020-02-25 | End: 2020-06-15 | Stop reason: SDUPTHER

## 2020-02-25 RX ORDER — AMOXICILLIN AND CLAVULANATE POTASSIUM 875; 125 MG/1; MG/1
TABLET, FILM COATED ORAL
COMMUNITY
Start: 2020-02-13 | End: 2020-04-13

## 2020-02-25 RX ORDER — LORAZEPAM 2 MG/1
TABLET ORAL
Qty: 46 TABLET | Refills: 3 | Status: SHIPPED | OUTPATIENT
Start: 2020-02-25 | End: 2020-06-15 | Stop reason: SDUPTHER

## 2020-02-25 RX ORDER — CIMETIDINE 400 MG/1
TABLET, FILM COATED ORAL
COMMUNITY
Start: 2020-02-19 | End: 2020-02-25 | Stop reason: ALTCHOICE

## 2020-02-25 RX ORDER — SULFAMETHOXAZOLE AND TRIMETHOPRIM 800; 160 MG/1; MG/1
TABLET ORAL
COMMUNITY
Start: 2020-02-24 | End: 2020-04-13

## 2020-02-25 RX ORDER — ALBUTEROL SULFATE 90 UG/1
2 AEROSOL, METERED RESPIRATORY (INHALATION) EVERY 4 HOURS PRN
COMMUNITY
Start: 2019-12-23 | End: 2020-06-09

## 2020-02-25 RX ORDER — TRAZODONE HYDROCHLORIDE 150 MG/1
150 TABLET ORAL NIGHTLY
Qty: 30 TABLET | Refills: 3 | Status: SHIPPED | OUTPATIENT
Start: 2020-02-25 | End: 2020-06-15 | Stop reason: SDUPTHER

## 2020-04-13 ENCOUNTER — OFFICE VISIT (OUTPATIENT)
Dept: CARDIOLOGY | Facility: CLINIC | Age: 65
End: 2020-04-13

## 2020-04-13 VITALS
WEIGHT: 248 LBS | HEIGHT: 71 IN | DIASTOLIC BLOOD PRESSURE: 76 MMHG | HEART RATE: 78 BPM | BODY MASS INDEX: 34.72 KG/M2 | SYSTOLIC BLOOD PRESSURE: 118 MMHG

## 2020-04-13 DIAGNOSIS — R00.1 SYMPTOMATIC BRADYCARDIA: Primary | ICD-10-CM

## 2020-04-13 DIAGNOSIS — R00.0 TACHYCARDIA: ICD-10-CM

## 2020-04-13 DIAGNOSIS — Z95.0 PACEMAKER: ICD-10-CM

## 2020-04-13 PROCEDURE — 99213 OFFICE O/P EST LOW 20 MIN: CPT | Performed by: INTERNAL MEDICINE
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
## 2020-06-09 ENCOUNTER — OFFICE VISIT (OUTPATIENT)
Dept: CARDIOLOGY | Facility: CLINIC | Age: 65
End: 2020-06-09

## 2020-06-09 VITALS
HEIGHT: 71 IN | HEART RATE: 77 BPM | TEMPERATURE: 97.5 F | DIASTOLIC BLOOD PRESSURE: 70 MMHG | WEIGHT: 254 LBS | BODY MASS INDEX: 35.56 KG/M2 | SYSTOLIC BLOOD PRESSURE: 115 MMHG

## 2020-06-09 DIAGNOSIS — I25.10 CORONARY ARTERY DISEASE INVOLVING NATIVE CORONARY ARTERY OF NATIVE HEART WITHOUT ANGINA PECTORIS: Primary | ICD-10-CM

## 2020-06-09 DIAGNOSIS — I10 ESSENTIAL HYPERTENSION: ICD-10-CM

## 2020-06-09 DIAGNOSIS — E78.5 DYSLIPIDEMIA: ICD-10-CM

## 2020-06-09 PROCEDURE — 99213 OFFICE O/P EST LOW 20 MIN: CPT | Performed by: INTERNAL MEDICINE
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
## 2020-06-15 ENCOUNTER — TELEMEDICINE (OUTPATIENT)
Dept: PSYCHIATRY | Facility: CLINIC | Age: 65
End: 2020-06-15

## 2020-06-15 DIAGNOSIS — F10.21 ALCOHOL DEPENDENCE IN REMISSION (HCC): ICD-10-CM

## 2020-06-15 DIAGNOSIS — F33.40 RECURRENT MAJOR DEPRESSIVE DISORDER, IN REMISSION (HCC): Primary | ICD-10-CM

## 2020-06-15 PROCEDURE — 99214 OFFICE O/P EST MOD 30 MIN: CPT | Performed by: PSYCHIATRY & NEUROLOGY

## 2020-06-15 RX ORDER — DOXEPIN HYDROCHLORIDE 25 MG/1
50 CAPSULE ORAL NIGHTLY
Qty: 60 CAPSULE | Refills: 5 | Status: SHIPPED | OUTPATIENT
Start: 2020-06-15 | End: 2020-12-15

## 2020-06-15 RX ORDER — DULOXETIN HYDROCHLORIDE 60 MG/1
CAPSULE, DELAYED RELEASE ORAL
Qty: 180 CAPSULE | Refills: 1 | Status: SHIPPED | OUTPATIENT
Start: 2020-06-15 | End: 2020-12-15 | Stop reason: SDUPTHER

## 2020-06-15 RX ORDER — LORAZEPAM 2 MG/1
TABLET ORAL
Qty: 46 TABLET | Refills: 5 | Status: SHIPPED | OUTPATIENT
Start: 2020-06-15 | End: 2020-11-30 | Stop reason: SDUPTHER

## 2020-06-15 RX ORDER — TRAZODONE HYDROCHLORIDE 150 MG/1
150 TABLET ORAL NIGHTLY
Qty: 90 TABLET | Refills: 1 | Status: SHIPPED | OUTPATIENT
Start: 2020-06-15 | End: 2020-12-15

## 2020-06-15 NOTE — PROGRESS NOTES
This patient visit is electronic.  The patient gave consent to be seen remotely, and when consent is given they understand that the consent allows for patient identifiable information to be sent to a third party as needed.   They may refuse to be seen remotely at any time. The electronic data is encrypted and password protected, and the patient has been advised of the potential risks to privacy not withstanding such measures.    The patient is located at his home in [facility]    Clinic visit by phone due to lack of alternative.    Patient ID: Cecilio Fierro is a 65 y.o. male    SERVICE TYPE: EVALUATION AND MANAGEMENT (greater than 50% of the time spent for supportive psychotherapy).      There were no vitals taken for this visit.    ALLERGIES:  Morphine and Zofran [ondansetron hcl]    CC/ Focus of the visit: Depression    HPI: Patient reports that he is doing fairly well with apparent recurrent significant depressive symptomatology, no drinking.  Interest activity sleep all relative norms for the patient.  Enjoying working in his garden as much is his physical status permits.    PFSH: Home life is stable      Review of Systems   Respiratory: Positive for shortness of breath.    Cardiovascular: Negative.    Musculoskeletal: Positive for back pain.     Patient has been called back to his primary care provider's office for results of recent lab work.  Nature of the issue to be determined.    SUPPORTIVE PSYCHOTHERAPY: continuing efforts to promote the therapeutic alliance, address the patient’s issues, and strengthen self awareness, insights, and coping skills.       Mental Status Exam  Appearance:    Attitude toward clinician:  cooperative and agreeable   Speech:    Rate:  regular rate and rhythm   Volume: normal  Motor:  no abnormal movements reported  Mood:  Good  Affect:  euthymic  Thought Processes:  linear, logical, and goal directed  Thought Content:  Normal   Feeling Hopeless: absent  Suicidal  Thoughts:  absent  Homicidal Thoughts:  absent  Perceptual Disturbance: no perceptual disturbance  Attention and Concentration:  good  Insight and Judgement:  good  Memory:  memory appears to be intact    LABS: No results found for this or any previous visit (from the past 168 hour(s)).    MEDICATION ISSUES: Have discussed with the patient the medications Risks, Benefits, and Side effects including potential falls, possible impaired driving and  metabolic adversities among others. No medication side effects or related complaints today.     TREATMENT PLAN/GOALS: Continue supportive psychotherapy efforts and medications as indicated.     Current Outpatient Medications   Medication Sig Dispense Refill   • aspirin 325 MG tablet Take 325 mg by mouth Daily.     • bumetanide (BUMEX) 1 MG tablet Take 1 mg by mouth 2 (Two) Times a Day.     • cetirizine (zyrTEC) 10 MG tablet Take 10 mg by mouth Daily.     • doxepin (SINEquan) 25 MG capsule Take 2 capsules by mouth Every Night. 60 capsule 5   • DULoxetine (Cymbalta) 60 MG capsule Take one bid. 180 capsule 1   • folic acid (FOLVITE) 1 MG tablet Take 1 mg by mouth Daily.     • gabapentin (NEURONTIN) 400 MG capsule Take 400 mg by mouth 3 (Three) Times a Day.     • LORazepam (ATIVAN) 2 MG tablet One half or 1 twice a day when necessary anxiety 46 tablet 5   • metoprolol tartrate (LOPRESSOR) 25 MG tablet Take 1 tablet by mouth 2 (Two) Times a Day. (Patient taking differently: Take 100 mg by mouth 2 (Two) Times a Day.) 60 tablet 11   • montelukast (SINGULAIR) 10 MG tablet Take 10 mg by mouth Every Night.     • nitroglycerin (NITROSTAT) 0.4 MG SL tablet Place 0.4 mg under the tongue Every 5 (Five) Minutes As Needed for Chest Pain.     • omeprazole (priLOSEC) 40 MG capsule Take 40 mg by mouth Daily.     • pravastatin (PRAVACHOL) 40 MG tablet Take 40 mg by mouth Daily.     • promethazine (PHENERGAN) 25 MG tablet Take 25 mg by mouth Every 6 (Six) Hours As Needed for Nausea or Vomiting.      • rOPINIRole (REQUIP) 3 MG tablet Take 3 mg by mouth 2 (Two) Times a Day. Take 1 hour before bedtime.      • tamsulosin (FLOMAX) 0.4 MG capsule 24 hr capsule Take 1 capsule by mouth Daily.     • traZODone (DESYREL) 150 MG tablet Take 1 tablet by mouth Every Night. 90 tablet 1     No current facility-administered medications for this visit.      Stephan reviewed.    COLLATERAL PSYCHOTHERAPEUTIC INTERVENTION:  patient not interested in additional psychotherapy.    RISK: Moderate    Assessment/Plan     Diagnoses and all orders for this visit:    Recurrent major depressive disorder, in remission (CMS/HCC)  -     LORazepam (ATIVAN) 2 MG tablet; One half or 1 twice a day when necessary anxiety  -     doxepin (SINEquan) 25 MG capsule; Take 2 capsules by mouth Every Night.  -     DULoxetine (Cymbalta) 60 MG capsule; Take one bid.  -     traZODone (DESYREL) 150 MG tablet; Take 1 tablet by mouth Every Night.    Alcohol dependence in remission (CMS/HCC)        Return in about 6 months (around 12/15/2020).           Patient knows to call if symptoms worsen or fail to improve between appointments.     I spent a total of 30 minutes in direct patient care, greater than 20 minutes (greater than 50%) were with the patient for assessment, coordination care, and counseling  regarding his status and answering any questions the patient had about the medication and plan..      Dictated utilizing [employer] Information daisy Almonte MD

## 2020-06-29 ENCOUNTER — TRANSCRIBE ORDERS (OUTPATIENT)
Dept: GENERAL RADIOLOGY | Facility: HOSPITAL | Age: 65
End: 2020-06-29

## 2020-06-29 ENCOUNTER — HOSPITAL ENCOUNTER (OUTPATIENT)
Dept: GENERAL RADIOLOGY | Facility: HOSPITAL | Age: 65
Discharge: HOME | End: 2020-06-29
Admitting: FAMILY MEDICINE

## 2020-06-29 DIAGNOSIS — M54.50 LOW BACK PAIN, UNSPECIFIED BACK PAIN LATERALITY, UNSPECIFIED CHRONICITY, UNSPECIFIED WHETHER SCIATICA PRESENT: ICD-10-CM

## 2020-06-29 DIAGNOSIS — M54.50 LOW BACK PAIN, UNSPECIFIED BACK PAIN LATERALITY, UNSPECIFIED CHRONICITY, UNSPECIFIED WHETHER SCIATICA PRESENT: Primary | ICD-10-CM

## 2020-06-29 PROCEDURE — 72110 X-RAY EXAM L-2 SPINE 4/>VWS: CPT

## 2020-06-29 PROCEDURE — 72110 X-RAY EXAM L-2 SPINE 4/>VWS: CPT | Performed by: RADIOLOGY

## 2020-08-17 DIAGNOSIS — F33.40 RECURRENT MAJOR DEPRESSIVE DISORDER, IN REMISSION (HCC): ICD-10-CM

## 2020-08-17 RX ORDER — DOXEPIN HYDROCHLORIDE 25 MG/1
CAPSULE ORAL
Qty: 60 CAPSULE | Refills: 5 | OUTPATIENT
Start: 2020-08-17

## 2020-09-18 ENCOUNTER — TRANSCRIBE ORDERS (OUTPATIENT)
Dept: LAB | Facility: HOSPITAL | Age: 65
End: 2020-09-18

## 2020-09-18 ENCOUNTER — HOSPITAL ENCOUNTER (OUTPATIENT)
Dept: GENERAL RADIOLOGY | Facility: HOSPITAL | Age: 65
Discharge: HOME | End: 2020-09-18

## 2020-09-18 DIAGNOSIS — R06.02 SOB (SHORTNESS OF BREATH): ICD-10-CM

## 2020-09-18 DIAGNOSIS — M54.89 OTHER DORSALGIA: Primary | ICD-10-CM

## 2020-09-18 DIAGNOSIS — M54.89 OTHER DORSALGIA: ICD-10-CM

## 2020-09-18 PROCEDURE — 72072 X-RAY EXAM THORAC SPINE 3VWS: CPT

## 2020-09-18 PROCEDURE — 72072 X-RAY EXAM THORAC SPINE 3VWS: CPT | Performed by: RADIOLOGY

## 2020-09-18 PROCEDURE — 71046 X-RAY EXAM CHEST 2 VIEWS: CPT

## 2020-09-18 PROCEDURE — 71046 X-RAY EXAM CHEST 2 VIEWS: CPT | Performed by: RADIOLOGY

## 2020-11-30 DIAGNOSIS — F33.40 RECURRENT MAJOR DEPRESSIVE DISORDER, IN REMISSION (HCC): ICD-10-CM

## 2020-11-30 RX ORDER — LORAZEPAM 2 MG/1
TABLET ORAL
Qty: 23 TABLET | Refills: 0 | Status: SHIPPED | OUTPATIENT
Start: 2020-11-30 | End: 2020-12-15 | Stop reason: SDUPTHER

## 2020-12-15 ENCOUNTER — OFFICE VISIT (OUTPATIENT)
Dept: PSYCHIATRY | Facility: CLINIC | Age: 65
End: 2020-12-15

## 2020-12-15 VITALS
HEIGHT: 71 IN | TEMPERATURE: 97.3 F | SYSTOLIC BLOOD PRESSURE: 142 MMHG | DIASTOLIC BLOOD PRESSURE: 78 MMHG | BODY MASS INDEX: 35.03 KG/M2 | WEIGHT: 250.2 LBS | HEART RATE: 85 BPM

## 2020-12-15 DIAGNOSIS — F33.40 RECURRENT MAJOR DEPRESSIVE DISORDER, IN REMISSION (HCC): ICD-10-CM

## 2020-12-15 DIAGNOSIS — Z79.899 MEDICATION MANAGEMENT: Primary | ICD-10-CM

## 2020-12-15 PROCEDURE — 99214 OFFICE O/P EST MOD 30 MIN: CPT | Performed by: PSYCHIATRY & NEUROLOGY

## 2020-12-15 RX ORDER — FLUTICASONE PROPIONATE 50 MCG
SPRAY, SUSPENSION (ML) NASAL
COMMUNITY
Start: 2020-12-10 | End: 2021-04-13

## 2020-12-15 RX ORDER — LORAZEPAM 2 MG/1
TABLET ORAL
Qty: 135 TABLET | Refills: 1 | Status: SHIPPED | OUTPATIENT
Start: 2020-12-15 | End: 2021-02-15 | Stop reason: SDUPTHER

## 2020-12-15 RX ORDER — CIMETIDINE 400 MG/1
TABLET, FILM COATED ORAL
COMMUNITY
Start: 2020-12-03 | End: 2021-04-13

## 2020-12-15 RX ORDER — DULOXETIN HYDROCHLORIDE 60 MG/1
CAPSULE, DELAYED RELEASE ORAL
Qty: 180 CAPSULE | Refills: 1 | Status: SHIPPED | OUTPATIENT
Start: 2020-12-15 | End: 2021-02-15 | Stop reason: SDUPTHER

## 2020-12-15 RX ORDER — TRAZODONE HYDROCHLORIDE 100 MG/1
TABLET ORAL
Qty: 180 TABLET | Refills: 1 | Status: SHIPPED | OUTPATIENT
Start: 2020-12-15 | End: 2021-02-15 | Stop reason: SDUPTHER

## 2020-12-15 RX ORDER — DOXEPIN HYDROCHLORIDE 50 MG/1
CAPSULE ORAL
Qty: 60 CAPSULE | Refills: 1 | Status: SHIPPED | OUTPATIENT
Start: 2020-12-15 | End: 2021-02-15

## 2020-12-15 RX ORDER — ALLOPURINOL 300 MG/1
TABLET ORAL
COMMUNITY
Start: 2020-11-17 | End: 2021-04-13

## 2020-12-15 RX ORDER — CYCLOBENZAPRINE HCL 10 MG
TABLET ORAL
COMMUNITY
Start: 2020-12-02 | End: 2021-04-13

## 2020-12-15 RX ORDER — AZELASTINE 1 MG/ML
SPRAY, METERED NASAL
COMMUNITY
Start: 2020-12-10 | End: 2021-04-13

## 2020-12-15 NOTE — PROGRESS NOTES
"Patient ID: Cecilio Fierro is a 65 y.o. male    SERVICE TYPE: EVALUATION AND MANAGEMENT (greater than 50% of the time spent for supportive psychotherapy).      /78   Pulse 85   Temp 97.3 °F (36.3 °C)   Ht 180.3 cm (70.98\")   Wt 113 kg (250 lb 3.2 oz)   BMI 34.91 kg/m²     ALLERGIES:  Morphine and Zofran [ondansetron hcl]    CC/ Focus of the visit: Depression     HPI: \"Sitting around quilting\". No recurrence of significant affective symptoms.  Sleeping issues: sleep apnea, restless legs, uses CPAP and Requip. Worse for the intermittent disruptions. Activities and interest normal for him.     No alcohol. No indication of Rx misuse or substance abuse.     PFSH:home life stable.     Review of Systems   HENT:        Recent inter ear infection treated by ENT with steroids and antibiotic.    Respiratory: Negative.    Cardiovascular: Negative.    Musculoskeletal: Positive for back pain.   Neurological:        Diabetic neuropathy       SUPPORTIVE PSYCHOTHERAPY: continuing efforts to promote the therapeutic alliance, address the patient’s issues, and strengthen self awareness, insights, and positive coping skills such as Exercising, listen to music, spending time in nature and utilizing resources.     Mental Status Exam  Appearance:  normal  Attitude toward clinician:  cooperative and agreeable   Speech:    Rate:  regular rate and rhythm   Volume: normal  Motor:  no abnormal movements   Mood:  Good  Affect:  euthymic  Thought Processes:  linear, logical, and goal directed  Thought Content:  Normal   Feeling Hopeless: absent  Suicidal Thoughts or Intent:  absent  Homicidal Thoughts:  absent  Perceptual Disturbance: no perceptual disturbance  Attention and Concentration:  good  Insight and Judgement:  good  Memory:  memory appears to be intact    LABS:   Recent Results (from the past 168 hour(s))   [facility] Drug Screen    Collection Time: 12/15/20  1:05 PM    Specimen: Urine, Clean Catch   Result Value Ref Range    " External Amphetamine Screen Urine Negative     Amphetamine Cut-Off 1000ng/ml     External Benzodiazepine Screen Urine Positive     Benzodiazipine Cut-Off 300ng/ml     External Cocaine Screen Urine Negative     Cocaine Cut-Off 300ng/ml     External THC Screen Urine Negative     THC Cut-Off 50ng/ml     External Methadone Screen Urine Negative     Methadone Cut-Off 300ng/ml     External Methamphetamine Screen Urine Negative     Methamphetamine Cut-Off 1000ng/ml     External Oxycodone Screen Urine Negative     Oxycodone Cut-Off 100ng/ml     External Buprenorphine Screen Urine Negative     Buprenorphine Cut-Off 10ng/ml     External MDMA Negative     MDMA Cut-Off 500ng/ml     External Opiates Screen Urine Negative     Opiates Cut-Off 300ng/ml        MEDICATION ISSUES: Have discussed with the patient the medications Risks, Benefits, and Side effects including potential falls, possible impaired driving and  metabolic adversities among others. No medication side effects or related complaints today.     TREATMENT PLAN/GOALS: Continue supportive psychotherapy efforts and medications as indicated.     Current Outpatient Medications   Medication Sig Dispense Refill   • aspirin 325 MG tablet Take 325 mg by mouth Daily.     • bumetanide (BUMEX) 1 MG tablet Take 1 mg by mouth 2 (Two) Times a Day.     • cetirizine (zyrTEC) 10 MG tablet Take 10 mg by mouth Daily.     • DULoxetine (Cymbalta) 60 MG capsule Take one bid. 180 capsule 1   • folic acid (FOLVITE) 1 MG tablet Take 1 mg by mouth Daily.     • gabapentin (NEURONTIN) 400 MG capsule Take 400 mg by mouth 3 (Three) Times a Day.     • LORazepam (ATIVAN) 2 MG tablet One half or 1 twice a day when necessary anxiety 135 tablet 1   • metoprolol tartrate (LOPRESSOR) 25 MG tablet TAKE 1 TABLET BY MOUTH TWO TIMES A DAY AS DIRECTED BY YOUR PRESCRIBER FOR HIGH BLOOD PRESSURE OR HEART 60 tablet 11   • montelukast (SINGULAIR) 10 MG tablet Take 10 mg by mouth Every Night.     • nitroglycerin  (NITROSTAT) 0.4 MG SL tablet Place 0.4 mg under the tongue Every 5 (Five) Minutes As Needed for Chest Pain.     • omeprazole (priLOSEC) 40 MG capsule Take 40 mg by mouth Daily.     • pravastatin (PRAVACHOL) 40 MG tablet Take 40 mg by mouth Daily.     • promethazine (PHENERGAN) 25 MG tablet Take 25 mg by mouth Every 6 (Six) Hours As Needed for Nausea or Vomiting.     • rOPINIRole (REQUIP) 3 MG tablet Take 3 mg by mouth 2 (Two) Times a Day. Take 1 hour before bedtime.      • tamsulosin (FLOMAX) 0.4 MG capsule 24 hr capsule Take 1 capsule by mouth Daily.     • allopurinol (ZYLOPRIM) 300 MG tablet      • azelastine (ASTELIN) 0.1 % nasal spray      • cimetidine (TAGAMET) 400 MG tablet      • cyclobenzaprine (FLEXERIL) 10 MG tablet      • doxepin (SINEquan) 50 MG capsule Take two at hs. 60 capsule 1   • fluticasone (FLONASE) 50 MCG/ACT nasal spray      • traZODone (DESYREL) 100 MG tablet Take two at hs. 180 tablet 1   • Wixela Inhub 250-50 MCG/DOSE DISKUS        No current facility-administered medications for this visit.        COLLATERAL PSYCHOTHERAPEUTIC INTERVENTION:  patient not interested in additional psychotherapy.    RISK:  Moderate to significant.     Assessment/Plan     Diagnoses and all orders for this visit:    1. Medication management (Primary)  -     [facility] Drug Screen    2. Recurrent major depressive disorder, in remission (CMS/HCC)  -     DULoxetine (Cymbalta) 60 MG capsule; Take one bid.  Dispense: 180 capsule; Refill: 1  -     LORazepam (ATIVAN) 2 MG tablet; One half or 1 twice a day when necessary anxiety  Dispense: 135 tablet; Refill: 1  -     doxepin (SINEquan) 50 MG capsule; Take two at hs.  Dispense: 60 capsule; Refill: 1  -     traZODone (DESYREL) 100 MG tablet; Take two at hs.  Dispense: 180 tablet; Refill: 1        Return in about 6 months (around 6/15/2021).           Patient knows to call if symptoms worsen or fail to improve between appointments.     I spent a total of 25 minutes in direct  patient care, greater than 18 minutes (greater than 50%) were with the patient for assessment, coordination care, and counseling  regarding his status and answering any questions the patient had about the medication and plan..      Dictated utilizing Dragon dictation    RICARDO Almonte MD

## 2021-02-12 PROCEDURE — 93296 REM INTERROG EVL PM/IDS: CPT | Performed by: INTERNAL MEDICINE

## 2021-02-12 PROCEDURE — 93294 REM INTERROG EVL PM/LDLS PM: CPT | Performed by: INTERNAL MEDICINE

## 2021-02-15 DIAGNOSIS — F33.40 RECURRENT MAJOR DEPRESSIVE DISORDER, IN REMISSION (HCC): ICD-10-CM

## 2021-02-15 RX ORDER — DOXEPIN HYDROCHLORIDE 50 MG/1
CAPSULE ORAL
Qty: 60 CAPSULE | Refills: 1 | Status: SHIPPED | OUTPATIENT
Start: 2021-02-15 | End: 2021-04-07

## 2021-02-15 RX ORDER — TRAZODONE HYDROCHLORIDE 100 MG/1
TABLET ORAL
Qty: 180 TABLET | Refills: 1 | Status: SHIPPED | OUTPATIENT
Start: 2021-02-15 | End: 2021-11-12

## 2021-02-15 RX ORDER — LORAZEPAM 2 MG/1
TABLET ORAL
Qty: 135 TABLET | Refills: 1 | Status: SHIPPED | OUTPATIENT
Start: 2021-02-15 | End: 2021-11-19

## 2021-02-15 RX ORDER — DULOXETIN HYDROCHLORIDE 60 MG/1
CAPSULE, DELAYED RELEASE ORAL
Qty: 180 CAPSULE | Refills: 1 | Status: SHIPPED | OUTPATIENT
Start: 2021-02-15 | End: 2021-12-13 | Stop reason: SDUPTHER

## 2021-03-19 ENCOUNTER — HOSPITAL ENCOUNTER (OUTPATIENT)
Dept: GENERAL RADIOLOGY | Facility: HOSPITAL | Age: 66
Discharge: HOME | End: 2021-03-19
Admitting: FAMILY MEDICINE

## 2021-03-19 ENCOUNTER — TRANSCRIBE ORDERS (OUTPATIENT)
Dept: LAB | Facility: HOSPITAL | Age: 66
End: 2021-03-19

## 2021-03-19 DIAGNOSIS — M25.562 LEFT KNEE PAIN, UNSPECIFIED CHRONICITY: Primary | ICD-10-CM

## 2021-03-19 DIAGNOSIS — M25.562 LEFT KNEE PAIN, UNSPECIFIED CHRONICITY: ICD-10-CM

## 2021-03-19 PROCEDURE — 73564 X-RAY EXAM KNEE 4 OR MORE: CPT

## 2021-03-19 PROCEDURE — 73564 X-RAY EXAM KNEE 4 OR MORE: CPT | Performed by: RADIOLOGY

## 2021-04-07 ENCOUNTER — TELEPHONE (OUTPATIENT)
Dept: CARDIOLOGY | Facility: CLINIC | Age: 66
End: 2021-04-07

## 2021-04-07 RX ORDER — DOXEPIN HYDROCHLORIDE 50 MG/1
CAPSULE ORAL
Qty: 60 CAPSULE | Refills: 1 | Status: SHIPPED | OUTPATIENT
Start: 2021-04-07 | End: 2021-05-10 | Stop reason: SDUPTHER

## 2021-04-07 NOTE — TELEPHONE ENCOUNTER
Wife left message that pt needs appt with [facility] for cc for planned TKR with [facility]. Surgery is not yet scheduled.   Voicemail was received from Larissa @ [facility] (on caller [phone]) relaying info as well however no callback number was given and no doctor.     Pt needs FU with [facility] for cardiac clearance for total knee surgery at [facility] (surgery not yet scheduled). Usually sees [facility] in [facility] however we will not add next week unless there is a cancellation. He can be on cancellation list for [place] or [facility]. He is scheduled to see Angelique on 4/16 in [place] but [facility] is not likely to clear without [facility] visit or recommendations.      Spoke with wife - he has to have COVID testing prior to surgery and is getting his first shot on Friday.

## 2021-04-08 ENCOUNTER — TELEPHONE (OUTPATIENT)
Dept: PSYCHIATRY | Facility: CLINIC | Age: 66
End: 2021-04-08

## 2021-04-08 RX ORDER — DOXEPIN HYDROCHLORIDE 50 MG/1
CAPSULE ORAL
Qty: 60 CAPSULE | Refills: 1 | Status: SHIPPED | OUTPATIENT
Start: 2021-04-08 | End: 2021-04-13

## 2021-04-13 ENCOUNTER — OFFICE VISIT (OUTPATIENT)
Dept: CARDIOLOGY | Facility: CLINIC | Age: 66
End: 2021-04-13

## 2021-04-13 VITALS
HEIGHT: 70 IN | OXYGEN SATURATION: 96 % | HEART RATE: 59 BPM | SYSTOLIC BLOOD PRESSURE: 104 MMHG | DIASTOLIC BLOOD PRESSURE: 70 MMHG | BODY MASS INDEX: 38.08 KG/M2 | WEIGHT: 266 LBS

## 2021-04-13 DIAGNOSIS — I25.10 CORONARY ARTERY DISEASE INVOLVING NATIVE CORONARY ARTERY OF NATIVE HEART WITHOUT ANGINA PECTORIS: ICD-10-CM

## 2021-04-13 DIAGNOSIS — E78.5 DYSLIPIDEMIA: ICD-10-CM

## 2021-04-13 DIAGNOSIS — I10 ESSENTIAL HYPERTENSION: ICD-10-CM

## 2021-04-13 PROCEDURE — 99214 OFFICE O/P EST MOD 30 MIN: CPT | Performed by: INTERNAL MEDICINE

## 2021-04-13 PROCEDURE — 93000 ELECTROCARDIOGRAM COMPLETE: CPT | Performed by: INTERNAL MEDICINE

## 2021-04-13 RX ORDER — METHOCARBAMOL 750 MG/1
750 TABLET, FILM COATED ORAL 2 TIMES DAILY
COMMUNITY
Start: 2021-03-30

## 2021-04-13 RX ORDER — RANITIDINE 300 MG/1
300 TABLET ORAL NIGHTLY
COMMUNITY
End: 2023-01-30
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
## 2021-04-16 ENCOUNTER — OFFICE VISIT (OUTPATIENT)
Dept: CARDIOLOGY | Facility: CLINIC | Age: 66
End: 2021-04-16

## 2021-04-16 VITALS
TEMPERATURE: 96.9 F | HEIGHT: 70 IN | HEART RATE: 70 BPM | WEIGHT: 262 LBS | DIASTOLIC BLOOD PRESSURE: 74 MMHG | BODY MASS INDEX: 37.51 KG/M2 | SYSTOLIC BLOOD PRESSURE: 118 MMHG | OXYGEN SATURATION: 95 %

## 2021-04-16 DIAGNOSIS — R00.1 SYMPTOMATIC BRADYCARDIA: ICD-10-CM

## 2021-04-16 DIAGNOSIS — Z95.0 PACEMAKER: ICD-10-CM

## 2021-04-16 DIAGNOSIS — I10 ESSENTIAL HYPERTENSION: Primary | ICD-10-CM

## 2021-04-16 PROCEDURE — 93280 PM DEVICE PROGR EVAL DUAL: CPT | Performed by: INTERNAL MEDICINE

## 2021-04-16 PROCEDURE — 99214 OFFICE O/P EST MOD 30 MIN: CPT | Performed by: INTERNAL MEDICINE
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
## 2021-05-10 RX ORDER — DOXEPIN HYDROCHLORIDE 50 MG/1
CAPSULE ORAL
Qty: 60 CAPSULE | Refills: 1 | Status: SHIPPED | OUTPATIENT
Start: 2021-05-10 | End: 2021-10-11

## 2021-05-14 PROCEDURE — 93296 REM INTERROG EVL PM/IDS: CPT | Performed by: INTERNAL MEDICINE

## 2021-05-14 PROCEDURE — 93294 REM INTERROG EVL PM/LDLS PM: CPT | Performed by: INTERNAL MEDICINE

## 2021-05-24 DIAGNOSIS — F33.40 RECURRENT MAJOR DEPRESSIVE DISORDER, IN REMISSION (HCC): ICD-10-CM

## 2021-05-24 RX ORDER — LORAZEPAM 2 MG/1
TABLET ORAL
Qty: 135 TABLET | Refills: 1 | OUTPATIENT
Start: 2021-05-24

## 2021-05-24 RX ORDER — TRAZODONE HYDROCHLORIDE 100 MG/1
TABLET ORAL
Qty: 180 TABLET | Refills: 1 | OUTPATIENT
Start: 2021-05-24

## 2021-06-10 ENCOUNTER — OFFICE VISIT (OUTPATIENT)
Dept: PSYCHIATRY | Facility: CLINIC | Age: 66
End: 2021-06-10

## 2021-06-10 VITALS
HEART RATE: 69 BPM | DIASTOLIC BLOOD PRESSURE: 75 MMHG | SYSTOLIC BLOOD PRESSURE: 123 MMHG | HEIGHT: 70 IN | WEIGHT: 262 LBS | BODY MASS INDEX: 37.51 KG/M2

## 2021-06-10 DIAGNOSIS — Z79.899 MEDICATION MANAGEMENT: ICD-10-CM

## 2021-06-10 DIAGNOSIS — F33.40 RECURRENT MAJOR DEPRESSIVE DISORDER, IN REMISSION (HCC): Primary | ICD-10-CM

## 2021-06-10 PROCEDURE — 99214 OFFICE O/P EST MOD 30 MIN: CPT | Performed by: PSYCHIATRY & NEUROLOGY

## 2021-06-10 NOTE — PROGRESS NOTES
"Patient ID: Cecilio Fierro is a 66 y.o. male    SERVICE TYPE: EVALUATION AND MANAGEMENT (greater than 50% of the time spent for supportive psychotherapy).      /75   Pulse 69   Ht 177.8 cm (70\")   Wt 119 kg (262 lb)   BMI 37.59 kg/m²     ALLERGIES:  Morphine and Zofran [ondansetron hcl]    CC/ Focus of the visit: Depression    HPI: Post op left knee surgery (s/p right knee 2015). Problems with initial insomnia, hateful, \"just aggravated\".   \"Need to be back on Xanax, not taking no pain medicine\".  Sounds like patient is somewhat depressed and discouraged with his daytime fatigue associated with his insomnia.  Perhaps he averages 3 to 4 hours per night.  His insomnia seems to be primarily initial in nature.  Patient is convinced that alprazolam will solve this problem, agrees to bring in his prescription bottle for lorazepam to the clinic tomorrow morning for pill count, seems reasonable to at least temporarily provide him with a low-dose extended release alprazolam replacement.  He agrees to see a sleep specialist to further evaluate possible treatment interventions for his insomnia complaint.  He has used BPAP for the past several years.  He does not seem to have experienced a recurrence of a major depressive episode that might account for the insomnia.  Is not drinking alcohol.     Once again there is no evidence the patient misuses or abuses his prescription medication or other substances at this point in time.    PFSH: Patient remains active with his garden and hobbies despite his recent knee surgery.  He and his wife enjoy the 5-year-old grandson, he has really been an asset to their lives.    Review of Systems  No cardiopulmonary, GI or neurological complaints.  Patient continues have lingering pain in his left knee from recent surgery but is not taking any opiates.  Did check his Stephan.    SUPPORTIVE PSYCHOTHERAPY: continuing efforts to promote the therapeutic alliance, address the patient’s issues, " and strengthen self awareness, insights, and positive coping skills such as Exercising, listen to music, spending time in nature and utilizing resources.     Mental Status Exam  Appearance: Appropriate  Attitude toward clinician:  cooperative and agreeable   Speech:    Rate:  regular rate and rhythm   Volume: normal  Motor:  no abnormal movements   Mood: Discouraged and irritable as relates to his fatigue.    Affect:  euthymic  Thought Processes:  linear, logical, and goal directed  Thought Content:  Normal   Feeling Hopeless: absent  Suicidal Thoughts or Intent:  absent  Homicidal Thoughts:  absent  Perceptual Disturbance: no perceptual disturbance  Attention and Concentration:  good  Insight and Judgement:  good  Memory:  memory appears to be intact    LABS:   Recent Results (from the past 168 hour(s))   [facility] Drug Screen    Collection Time: 06/10/21  1:37 PM    Specimen: Urine, Clean Catch   Result Value Ref Range    External Amphetamine Screen Urine Negative     Amphetamine Cut-Off 1000NG/ML     External Benzodiazepine Screen Urine Positive     Benzodiazipine Cut-Off 300NG/ML     External Cocaine Screen Urine Negative     Cocaine Cut-Off 300NG/ML     External THC Screen Urine Negative     THC Cut-Off 50NG/ML     External Methadone Screen Urine Negative     Methadone Cut-Off 300NG/ML     External Methamphetamine Screen Urine Negative     Methamphetamine Cut-Off 1000NG/ML     External Oxycodone Screen Urine Negative     Oxycodone Cut-Off 100NG/ML     External Buprenorphine Screen Urine Negative     Buprenorphine Cut-Off 10NG/ML     External MDMA Negative     MDMA Cut-Off 500NG/ML     External Opiates Screen Urine Negative     Opiates Cut-Off 300NG/ML        MEDICATION ISSUES: Have discussed with the patient the medications Risks, Benefits, and Side effects including potential falls, possible impaired driving and  metabolic adversities among others. No medication side effects or related complaints today.      TREATMENT PLAN/GOALS: Continue supportive psychotherapy efforts and medications as indicated.     Current Outpatient Medications   Medication Sig Dispense Refill   • aspirin 325 MG tablet Take 325 mg by mouth Daily.     • bumetanide (BUMEX) 1 MG tablet Take 1 mg by mouth 2 (Two) Times a Day.     • cetirizine (zyrTEC) 10 MG tablet Take 10 mg by mouth Daily.     • doxepin (SINEquan) 50 MG capsule TAKE TWO CAPSULES BY MOUTH ONCE A DAY AT BEDTIME 60 capsule 1   • DULoxetine (Cymbalta) 60 MG capsule Take one bid. 180 capsule 1   • folic acid (FOLVITE) 1 MG tablet Take 1 mg by mouth Daily.     • gabapentin (NEURONTIN) 400 MG capsule Take 400 mg by mouth 3 (Three) Times a Day.     • LORazepam (ATIVAN) 2 MG tablet One half or 1 twice a day when necessary anxiety 135 tablet 1   • metoprolol tartrate (LOPRESSOR) 25 MG tablet TAKE 1 TABLET BY MOUTH TWO TIMES A DAY AS DIRECTED BY YOUR PRESCRIBER FOR HIGH BLOOD PRESSURE OR HEART (Patient taking differently: 100 mg 2 (Two) Times a Day.) 60 tablet 11   • montelukast (SINGULAIR) 10 MG tablet Take 10 mg by mouth Every Night.     • nitroglycerin (NITROSTAT) 0.4 MG SL tablet Place 0.4 mg under the tongue Every 5 (Five) Minutes As Needed for Chest Pain.     • omeprazole (priLOSEC) 40 MG capsule Take 40 mg by mouth Daily.     • pravastatin (PRAVACHOL) 40 MG tablet Take 40 mg by mouth Daily.     • promethazine (PHENERGAN) 25 MG tablet Take 25 mg by mouth Every 6 (Six) Hours As Needed for Nausea or Vomiting.     • raNITIdine (ZANTAC) 300 MG tablet Take 300 mg by mouth Every Night.     • rOPINIRole (REQUIP) 3 MG tablet Take 3 mg by mouth 2 (Two) Times a Day. Take 1 hour before bedtime.      • tamsulosin (FLOMAX) 0.4 MG capsule 24 hr capsule Take 1 capsule by mouth Daily.     • traZODone (DESYREL) 100 MG tablet Take two at hs. (Patient taking differently: 150 mg Every Night.) 180 tablet 1   • methocarbamol (ROBAXIN) 750 MG tablet TAKE 1 TABLET BY MOUTH THREE TIMES A DAY FOR MUSCLE  RELAXATION     • Wixela Inhub 250-50 MCG/DOSE DISKUS        No current facility-administered medications for this visit.       COLLATERAL PSYCHOTHERAPEUTIC INTERVENTION:  patient not interested in additional psychotherapy.    RISK: Moderate    Assessment/Plan     Diagnoses and all orders for this visit:    1. Recurrent major depressive disorder, in remission (CMS/HCC) (Primary)  Patient continues to take the Cymbalta, trazodone, doxepin as prescribed, has a supply of all the above to do till August.  See HPI above for the benzodiazepine issue and explanation..    2. Medication management  -     [facility] Drug Screen        Return in about 6 months (around 12/10/2021).           Patient knows to call if symptoms worsen or fail to improve between appointments.    I spent 30 minutes caring for Cecilio on this date of service. This time includes time spent by me in the following activities: Patient evaluation, support psychotherapy, decisions, medications, and documentation.     Dictated utilizing Dragon dictation    RICARDO Almonte MD

## 2021-06-11 ENCOUNTER — DOCUMENTATION (OUTPATIENT)
Dept: PSYCHIATRY | Facility: HOSPITAL | Age: 66
End: 2021-06-11

## 2021-06-11 DIAGNOSIS — F33.40 RECURRENT MAJOR DEPRESSIVE DISORDER, IN REMISSION (HCC): ICD-10-CM

## 2021-06-11 DIAGNOSIS — F33.41 RECURRENT MAJOR DEPRESSION IN PARTIAL REMISSION (HCC): Primary | ICD-10-CM

## 2021-06-11 RX ORDER — ALPRAZOLAM 2 MG/1
TABLET, EXTENDED RELEASE ORAL
Qty: 30 TABLET | Refills: 5 | OUTPATIENT
Start: 2021-06-11 | End: 2021-06-14

## 2021-06-11 NOTE — PROGRESS NOTES
Patient returns today with his prescription bottle for lorazepam 2 mg field May 25, 2021.  There were 120 pills indicating patient has taken 15 in the past 2 weeks which is correct.  Patient has returned the lorazepam and exchanged for alprazolam as explained at yesterday's clinic note.  The lorazepam bottle and pills will be turned into the hospital pharmacy for disposal.  Witnessed by [facility] staff RN and myself.  Patient to be prescribed alprazolam ER 2 mg to take 1 daily at 8 PM.  #30 with 5 monthly refills.  Included instruction to the pharmacy to cancel all prior prescriptions and refills for lorazepam.

## 2021-06-14 ENCOUNTER — TELEPHONE (OUTPATIENT)
Dept: PSYCHIATRY | Facility: CLINIC | Age: 66
End: 2021-06-14

## 2021-06-14 DIAGNOSIS — E27.8 NODULAR ADRENAL CORTEX (HCC): ICD-10-CM

## 2021-06-14 DIAGNOSIS — F41.8 DEPRESSION WITH ANXIETY: Primary | ICD-10-CM

## 2021-06-14 DIAGNOSIS — C80.1 MALIGNANT NEOPLASTIC DISEASE (HCC): ICD-10-CM

## 2021-06-14 DIAGNOSIS — F10.21 ALCOHOL DEPENDENCE IN REMISSION (HCC): ICD-10-CM

## 2021-06-14 PROBLEM — E27.9 NODULAR ADRENAL CORTEX: Status: ACTIVE | Noted: 2021-06-14

## 2021-06-14 RX ORDER — ALPRAZOLAM 2 MG/1
TABLET, EXTENDED RELEASE ORAL
Qty: 30 TABLET | Refills: 5 | Status: SHIPPED | OUTPATIENT
Start: 2021-06-14 | End: 2021-12-06 | Stop reason: SDUPTHER

## 2021-08-04 ENCOUNTER — TELEPHONE (OUTPATIENT)
Dept: PSYCHIATRY | Facility: CLINIC | Age: 66
End: 2021-08-04

## 2021-10-11 RX ORDER — DOXEPIN HYDROCHLORIDE 50 MG/1
CAPSULE ORAL
Qty: 60 CAPSULE | Refills: 1 | Status: SHIPPED | OUTPATIENT
Start: 2021-10-11 | End: 2021-12-13 | Stop reason: SDUPTHER

## 2021-11-12 PROCEDURE — 93296 REM INTERROG EVL PM/IDS: CPT | Performed by: STUDENT IN AN ORGANIZED HEALTH CARE EDUCATION/TRAINING PROGRAM

## 2021-11-12 PROCEDURE — 93294 REM INTERROG EVL PM/LDLS PM: CPT | Performed by: STUDENT IN AN ORGANIZED HEALTH CARE EDUCATION/TRAINING PROGRAM

## 2021-11-12 RX ORDER — TRAZODONE HYDROCHLORIDE 100 MG/1
TABLET ORAL
Qty: 60 TABLET | Refills: 0 | Status: SHIPPED | OUTPATIENT
Start: 2021-11-12 | End: 2021-12-13 | Stop reason: SDUPTHER

## 2021-11-19 ENCOUNTER — OFFICE VISIT (OUTPATIENT)
Dept: CARDIOLOGY | Facility: CLINIC | Age: 66
End: 2021-11-19

## 2021-11-19 VITALS
BODY MASS INDEX: 37.6 KG/M2 | OXYGEN SATURATION: 96 % | DIASTOLIC BLOOD PRESSURE: 84 MMHG | HEIGHT: 71 IN | WEIGHT: 268.6 LBS | SYSTOLIC BLOOD PRESSURE: 134 MMHG | HEART RATE: 70 BPM

## 2021-11-19 DIAGNOSIS — I47.1 ATRIAL TACHYCARDIA (HCC): ICD-10-CM

## 2021-11-19 DIAGNOSIS — Z95.0 PACEMAKER: ICD-10-CM

## 2021-11-19 DIAGNOSIS — I25.10 CORONARY ARTERY DISEASE INVOLVING NATIVE CORONARY ARTERY OF NATIVE HEART WITHOUT ANGINA PECTORIS: Primary | ICD-10-CM

## 2021-11-19 PROCEDURE — 93280 PM DEVICE PROGR EVAL DUAL: CPT | Performed by: STUDENT IN AN ORGANIZED HEALTH CARE EDUCATION/TRAINING PROGRAM

## 2021-11-19 PROCEDURE — 99214 OFFICE O/P EST MOD 30 MIN: CPT | Performed by: STUDENT IN AN ORGANIZED HEALTH CARE EDUCATION/TRAINING PROGRAM

## 2021-11-19 NOTE — PROGRESS NOTES
Cardiac Electrophysiology Outpatient Note  [facility]    Office Visit     Cecilio Fierro  [id number]  11/19/2021    Primary Care Physician: Praveen Odell MD    Referred By: No ref. provider found    Subjective     Chief Complaint   Patient presents with   • Coronary Artery Disease       History of Present Illness:   Cecilio Fierro is a 66 y.o. male who presents to my electrophysiology clinic for follow up of a medic bradycardia status post [employer] dual-chamber pacemaker, atrial tachycardia, coronary disease.  He was last seen in clinic approximately 7 months ago.  He is overall done well since then.  His main issue is continued pain with his hips.  He was told that he may need surgery, but he is quite hesitant for this at the current time.  He has had no problems with his pacemaker site.  He does have palpitations occasionally that he is aware of but are not overall too bothersome to him.  He denies chest pain, dyspnea on exertion, dyspnea, orthopnea, PND.  Does have mild lower extremity swelling mostly on his left shin which she noticed after his knee replacement this past year.  He is taking all his medications without issue.    Past Medical History:   Diagnosis Date   • Anxiety    • Anxiety and depression 1/13/2017   • Bleeds easily (HCC)     per pt and wife    • Bursitis     left hip    • CAD (coronary artery disease) 1/13/2017    5 stents in heart    • Chronic kidney failure     sees Dr Hicks every 6 months   • Depression    • Diverticulosis 1/13/2017   • Dyslipidemia 1/13/2017   • Emphysema of lung (HCC)    • Hemorrhoid     needs surgery    • Hypertension 1/13/2017   • Low back pain     needs another back surgery    • Neuropathy 1/13/2017   • Pacemaker at end of battery life    • Palpitations     worsening since august 2017   • Restless leg syndrome    • Sleep apnea with use of continuous positive airway pressure (CPAP)    • Symptomatic bradycardia 1/13/2017    • TIA (transient ischemic attack)     Questionable TIA vs. seizure, June 2014. Negative head CT and carotid duplex.Residual left facial droop and speech impairment, July 2014.    • Tobacco use 1/13/2017   • Wears dentures     upper only    • Wears glasses        Past Surgical History:   Procedure Laterality Date   • ADRENAL GLAND SURGERY Right 02/2019   • BACK SURGERY     • CARDIAC CATHETERIZATION     • CARDIAC ELECTROPHYSIOLOGY PROCEDURE N/A 2/21/2018    Procedure: PPM battery change- Impulse, schedule in 3 months;  Surgeon: Montez Latham DO;  Location: [facility] INVASIVE LOCATION;  Service:    • CHOLECYSTECTOMY     • COLONOSCOPY     • CORONARY ANGIOPLASTY WITH STENT PLACEMENT      x5   • HEMORRHOIDECTOMY     • INSERT / REPLACE / REMOVE PACEMAKER  2003    pacemaker originally inserted in 2003 by Dr Chawla    • JOINT REPLACEMENT Right 2014   • LIPOMA RESECTION      Lipoma resection from abdominal wall.    • OTHER SURGICAL HISTORY      Bullet removal.   • PACEMAKER IMPLANTATION  10/2003    Generator change 02/14/2007, [employer]    • REPLACEMENT TOTAL KNEE Right        Family History   Problem Relation Age of Onset   • Cancer Other    • Diabetes Other    • Alzheimer's disease Other    • Stroke Other    • Heart disease Other    • Hypertension Other    • Cancer Father        Social History     Socioeconomic History   • Marital status:    Tobacco Use   • Smoking status: Current Every Day Smoker     Packs/day: 0.25     Years: 47.00     Pack years: 11.75     Types: Cigarettes   • Smokeless tobacco: Never Used   Vaping Use   • Vaping Use: Never used   Substance and Sexual Activity   • Alcohol use: No   • Drug use: No   • Sexual activity: Defer         Current Outpatient Medications:   •  ALPRAZolam XR (XANAX XR) 2 MG 24 hr tablet, Take one at 8 PM daily  Indications: anxiety disorder, Disp: 30 tablet, Rfl: 5  •  aspirin 325 MG tablet, Take 325 mg by mouth Daily., Disp: , Rfl:   •  bumetanide  (BUMEX) 1 MG tablet, Take 1 mg by mouth 2 (Two) Times a Day., Disp: , Rfl:   •  cetirizine (zyrTEC) 10 MG tablet, Take 10 mg by mouth Daily., Disp: , Rfl:   •  doxepin (SINEquan) 50 MG capsule, TAKE 2 CAPSULES BY MOUTH ONCE A DAY AT BEDTIME, Disp: 60 capsule, Rfl: 1  •  DULoxetine (Cymbalta) 60 MG capsule, Take one bid., Disp: 180 capsule, Rfl: 1  •  folic acid (FOLVITE) 1 MG tablet, Take 1 mg by mouth Daily., Disp: , Rfl:   •  gabapentin (NEURONTIN) 400 MG capsule, Take 400 mg by mouth 3 (Three) Times a Day., Disp: , Rfl:   •  methocarbamol (ROBAXIN) 750 MG tablet, TAKE 1 TABLET BY MOUTH THREE TIMES A DAY FOR MUSCLE RELAXATION, Disp: , Rfl:   •  metoprolol tartrate (LOPRESSOR) 25 MG tablet, Take 1 tablet by mouth 2 (Two) Times a Day., Disp: 60 tablet, Rfl: 11  •  montelukast (SINGULAIR) 10 MG tablet, Take 10 mg by mouth Every Night., Disp: , Rfl:   •  nitroglycerin (NITROSTAT) 0.4 MG SL tablet, Place 0.4 mg under the tongue Every 5 (Five) Minutes As Needed for Chest Pain., Disp: , Rfl:   •  omeprazole (priLOSEC) 40 MG capsule, Take 40 mg by mouth Daily., Disp: , Rfl:   •  pravastatin (PRAVACHOL) 40 MG tablet, Take 40 mg by mouth Daily., Disp: , Rfl:   •  promethazine (PHENERGAN) 25 MG tablet, Take 25 mg by mouth Every 6 (Six) Hours As Needed for Nausea or Vomiting., Disp: , Rfl:   •  raNITIdine (ZANTAC) 300 MG tablet, Take 300 mg by mouth Every Night., Disp: , Rfl:   •  rOPINIRole (REQUIP) 3 MG tablet, Take 3 mg by mouth 2 (Two) Times a Day. Take 1 hour before bedtime. , Disp: , Rfl:   •  tamsulosin (FLOMAX) 0.4 MG capsule 24 hr capsule, Take 1 capsule by mouth Daily., Disp: , Rfl:   •  traZODone (DESYREL) 100 MG tablet, TAKE TWO TABLETS BY MOUTH AT BEDTIME FOR SLEEP, Disp: 60 tablet, Rfl: 0  •  Wixela Inhub 250-50 MCG/DOSE DISKUS, Inhale 2 puffs As Needed., Disp: , Rfl:     Allergies:   Allergies   Allergen Reactions   • Morphine Itching   • Zofran [Ondansetron Hcl] Other (See Comments)     Caused nausea and  "itching.        Objective   Vital Signs: Blood pressure 134/84, pulse 70, height 179.1 cm (70.5\"), weight 122 kg (268 lb 9.6 oz), SpO2 96 %.    PHYSICAL EXAM  General appearance: Awake, alert, cooperative  Head: Normocephalic, without obvious abnormality, atraumatic  Neck: No JVD  Lungs: Clear to ascultation bilaterally  Heart: Regular rate and rhythm, no murmurs, 2+ LE pulses, trace left lower extremity swelling  Skin: Skin color, turgor normal, no rashes or lesions  Neurologic: Grossly normal     Lab Results   Component Value Date    GLUCOSE 88 04/18/2018    CALCIUM 9.0 04/18/2018     04/18/2018    K 3.6 04/18/2018    CO2 33.0 (H) 04/18/2018     04/18/2018    BUN 14 04/18/2018    CREATININE 1.20 07/31/2018    EGFRIFNONA 68 04/18/2018    BCR 12.7 04/18/2018    ANIONGAP 4.0 04/18/2018     Lab Results   Component Value Date    WBC 13.61 (H) 04/18/2018    HGB 12.4 (L) 04/18/2018    HCT 39.5 04/18/2018    MCV 88.4 04/18/2018     04/18/2018     Lab Results   Component Value Date    INR 0.90 (L) 02/20/2018    PROTIME 9.4 (L) 02/20/2018     Lab Results   Component Value Date    TSH 0.259 (L) 12/15/2015          Results for orders placed during the hospital encounter of 04/30/18    Adult Transthoracic Echo Complete W/ Cont if Necessary Per Protocol    Interpretation Summary  · Normal left ventricular cavity size and wall thickness noted.  · Left ventricular systolic function is low normal. Estimated EF appears to be in the range of 51 - 55%.  · Left ventricular diastolic dysfunction (grade I) consistent with impaired relaxation.  · Trace mitral valve regurgitation is present.  · Trace tricuspid valve regurgitation is present. No evidence of pulmonary hypertension is present.  · There is no evidence of pericardial effusion.  · Electronic lead present in the ventricle.       Cecilio Fierro  reports that he has been smoking cigarettes. He has a 11.75 pack-year smoking history. He has never used smokeless " tobacco..       Assessment & Plan    1. Pacemaker  His [employer] dual-chamber pacemaker was interrogated and is functioning well.  He is approximately 10 years of battery life remaining.  He is pacing 59% of the atrium less than 1% of time in the ventricle.  His pacing sensing thresholds all the normal limits.  He does have numerous atrial tachycardia episodes.  The longest of this was approximately 57 minutes and the fastest was 190 bpm for about 3-1/2 minutes.    2. Coronary artery disease involving native coronary artery of native heart without angina pectoris  He has a history of coronary disease status post PCI.  He does not have any chest pain currently.  He will continue on metoprolol, aspirin and statin.    3. Atrial tachycardia (HCC)  As noted above, he has multiple atrial tachycardia episodes on his device.  Most of these were short-lived, but did have one event up to 57 minutes.  There was no atrial fibrillation seen on his device.  He is aware of these episodes, but not overly bothered by them.  We discussed that the indication to treat these would be bothersome symptoms at the current time.  If we need to do the first and to do would likely be go up on his metoprolol.  We did also discussed that people that have these events may be at a higher risk of atrial fibrillation going forward and we will need to continue to monitor for this.       Follow Up:  Return in about 1 year (around 11/19/2022) for [facility].      Thank you for allowing me to participate in the care of your patient. Please do not hesitate to contact me with additional questions or concerns.      Mikel Mae M.D.  Cardiac Electrophysiologist  [facility]

## 2021-11-22 DIAGNOSIS — I25.810 CORONARY ARTERY DISEASE INVOLVING CORONARY BYPASS GRAFT OF NATIVE HEART WITHOUT ANGINA PECTORIS: Primary | ICD-10-CM

## 2021-11-30 ENCOUNTER — OFFICE VISIT (OUTPATIENT)
Dept: CARDIOLOGY | Facility: CLINIC | Age: 66
End: 2021-11-30

## 2021-11-30 VITALS
WEIGHT: 274.6 LBS | HEIGHT: 70 IN | BODY MASS INDEX: 39.31 KG/M2 | TEMPERATURE: 97.3 F | HEART RATE: 69 BPM | DIASTOLIC BLOOD PRESSURE: 86 MMHG | SYSTOLIC BLOOD PRESSURE: 126 MMHG

## 2021-11-30 DIAGNOSIS — F41.8 DEPRESSION WITH ANXIETY: ICD-10-CM

## 2021-11-30 DIAGNOSIS — R00.2 PALPITATIONS: ICD-10-CM

## 2021-11-30 DIAGNOSIS — I25.6 SILENT MYOCARDIAL ISCHEMIA: ICD-10-CM

## 2021-11-30 DIAGNOSIS — I25.9 IHD (ISCHEMIC HEART DISEASE): ICD-10-CM

## 2021-11-30 DIAGNOSIS — E27.8 NODULAR ADRENAL CORTEX (HCC): ICD-10-CM

## 2021-11-30 DIAGNOSIS — R06.02 SHORTNESS OF BREATH: ICD-10-CM

## 2021-11-30 DIAGNOSIS — R07.2 PRECORDIAL PAIN: ICD-10-CM

## 2021-11-30 DIAGNOSIS — I49.5 SSS (SICK SINUS SYNDROME) (HCC): Primary | ICD-10-CM

## 2021-11-30 DIAGNOSIS — F17.200 SMOKING: ICD-10-CM

## 2021-11-30 DIAGNOSIS — G47.30 SLEEP APNEA IN ADULT: ICD-10-CM

## 2021-11-30 DIAGNOSIS — E78.00 HYPERCHOLESTEREMIA: ICD-10-CM

## 2021-11-30 DIAGNOSIS — I10 PRIMARY HYPERTENSION: ICD-10-CM

## 2021-11-30 DIAGNOSIS — E88.81 METABOLIC SYNDROME: ICD-10-CM

## 2021-11-30 PROBLEM — E88.810 METABOLIC SYNDROME: Status: ACTIVE | Noted: 2021-11-30

## 2021-11-30 PROBLEM — C80.1 MALIGNANT NEOPLASTIC DISEASE: Status: RESOLVED | Noted: 2021-06-14 | Resolved: 2021-11-30

## 2021-11-30 PROBLEM — F10.21 ALCOHOL DEPENDENCE IN REMISSION: Status: RESOLVED | Noted: 2021-06-14 | Resolved: 2021-11-30

## 2021-11-30 PROBLEM — IMO0001 SMOKING: Status: ACTIVE | Noted: 2021-11-30

## 2021-11-30 PROCEDURE — 93000 ELECTROCARDIOGRAM COMPLETE: CPT | Performed by: INTERNAL MEDICINE

## 2021-11-30 PROCEDURE — 99214 OFFICE O/P EST MOD 30 MIN: CPT | Performed by: INTERNAL MEDICINE

## 2021-11-30 RX ORDER — ALPRAZOLAM 2 MG/1
TABLET, EXTENDED RELEASE ORAL
Qty: 30 TABLET | Refills: 5 | OUTPATIENT
Start: 2021-11-30

## 2021-11-30 RX ORDER — NICOTINE 10 MG
1 CARTRIDGE (EA) INHALATION AS NEEDED
Qty: 168 EACH | Refills: 6 | Status: SHIPPED | OUTPATIENT
Start: 2021-11-30 | End: 2022-08-25

## 2021-11-30 NOTE — PROGRESS NOTES
Chief Complaint   Patient presents with   • Establish Care     Referred per   for CAD his last office visit with Dr. Mae 11-  . Had previously been a patient of [facility]  cardiolgy . Last Echo  2018 .Patient cannot remember last Stress test    • Pacemaker Check     [employer] device implanted febuary 2018 , most recent  remote check 11-2-2021, shows tachycardia. His original pacemaker was implanted  in 2003.   • Palpitations     Reports having palpitations about  3-4 times weekly . He said usually rest will ease up   • Dizziness     Patient appears to be off balance when he stands up  this morning . He said this is not normal for him.   • Sleep Apnea     Is compliant with Cpap and oxygen at night   • LABS     Had labs per PCP, will call for results         CARDIAC COMPLAINTS  chest pressure/discomfort, dyspnea, fatigue and palpitations      Subjective   Cecilio Fierro is a 66 y.o. male came in today for his initial cardiac evaluation.  He has history of ischemic heart disease diagnosed many years ago where he apparently underwent about 9 cardiac catheterization at [facility] and then and according to him had few stents placed.  Details is not available to me.  He has been followed by Dr. Huizar for the last few years.  His last cardiac catheterization in 2015 did not show any significant narrowing.  He also has a sick sinus syndrome for which he had a pacemaker placed in 2003 and was replaced in 2018.  He now wants to be followed by general cardiologist also along with the electrophysiologist.  At this time he has been noticing occasional palpitation.  It last for few seconds.  Most of the time it occurs during exertion and then subsides.  He also has been having episodes of dizziness when he suddenly stands up from lying position.  It occurs more in the morning.  He also has been having some chest tightness in the form of pressure-like feeling in the middle part of the chest which occurs  both during exertion as well as at rest.  He also has history of sleep apnea and uses CPAP and oxygen.  He apparently had some labs done at your office but I don't have any of the results.  The last set of results I have is from 2018.  He does have some Tox screen checked in June of this year.  He unfortunately still continues to smoke about quarter pack a day.  He has no significant family history of heart disease.  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  Prior to that he was taking multiple medication and also potassium supplements.  He is under the impression that it was a benign tumor.    Past Surgical History:   Procedure Laterality Date   • ADRENAL GLAND SURGERY Right 02/2019   • BACK SURGERY     • CARDIAC CATHETERIZATION  02/25/2015    Dr. Huizar- Non Obstructive disease   • CARDIAC ELECTROPHYSIOLOGY PROCEDURE N/A 2/21/2018    PPM battery change- Dr.Sandeep Latham   • CHOLECYSTECTOMY     • COLONOSCOPY     • CORONARY ANGIOPLASTY WITH STENT PLACEMENT      x5   • ECHO - CONVERTED  04/18/2018    -= EF 55%.Trace MR   • HEMORRHOIDECTOMY     • INSERT / REPLACE / REMOVE PACEMAKER  2003    pacemaker originally inserted in 2003 by Dr Chawla    • JOINT REPLACEMENT Right 2014   • LIPOMA RESECTION      Lipoma resection from abdominal wall.    • OTHER SURGICAL HISTORY      Bullet removal.   • PACEMAKER IMPLANTATION  10/2003    Generator change 02/14/2007, [facility]    • PET MULTI STUDY REST STRESS  01/26/2015    - EF 55%. No Ischemia   • REPLACEMENT TOTAL KNEE Right        Current Outpatient Medications   Medication Sig Dispense Refill   • ALPRAZolam XR (XANAX XR) 2 MG 24 hr tablet Take one at 8 PM daily  Indications: anxiety disorder 30 tablet 5   • aspirin 325 MG tablet Take 325 mg by mouth Daily.     • bumetanide (BUMEX) 1 MG tablet Take 1 mg by mouth 2 (Two) Times a Day.     • cetirizine (zyrTEC) 10 MG tablet Take  10 mg by mouth Daily.     • doxepin (SINEquan) 50 MG capsule TAKE 2 CAPSULES BY MOUTH ONCE A DAY AT BEDTIME 60 capsule 1   • DULoxetine (Cymbalta) 60 MG capsule Take one bid. 180 capsule 1   • folic acid (FOLVITE) 1 MG tablet Take 1 mg by mouth Daily.     • gabapentin (NEURONTIN) 400 MG capsule Take 400 mg by mouth 3 (Three) Times a Day.     • methocarbamol (ROBAXIN) 750 MG tablet Take 750 mg by mouth 2 (Two) Times a Day.     • metoprolol tartrate (LOPRESSOR) 25 MG tablet Take 1 tablet by mouth 2 (Two) Times a Day. 60 tablet 11   • montelukast (SINGULAIR) 10 MG tablet Take 10 mg by mouth Every Night.     • nitroglycerin (NITROSTAT) 0.4 MG SL tablet Place 0.4 mg under the tongue Every 5 (Five) Minutes As Needed for Chest Pain.     • omeprazole (priLOSEC) 40 MG capsule Take 40 mg by mouth Daily.     • pravastatin (PRAVACHOL) 40 MG tablet Take 40 mg by mouth Daily.     • promethazine (PHENERGAN) 25 MG tablet Take 25 mg by mouth Every 6 (Six) Hours As Needed for Nausea or Vomiting.     • raNITIdine (ZANTAC) 300 MG tablet Take 300 mg by mouth Every Night.     • rOPINIRole (REQUIP) 3 MG tablet Take 3 mg by mouth 2 (Two) Times a Day. Take 1 hour before bedtime.      • tamsulosin (FLOMAX) 0.4 MG capsule 24 hr capsule Take 1 capsule by mouth Daily.     • traZODone (DESYREL) 100 MG tablet TAKE TWO TABLETS BY MOUTH AT BEDTIME FOR SLEEP 60 tablet 0   • Wixela Inhub 250-50 MCG/DOSE DISKUS Inhale 2 puffs As Needed.     • nicotine (Nicotrol) 10 MG inhaler Inhale 1 puff As Needed for Smoking Cessation. 168 each 6     No current facility-administered medications for this visit.           ALLERGIES:  Morphine and Zofran [ondansetron hcl]    Past Medical History:   Diagnosis Date   • Anxiety    • Anxiety and depression 1/13/2017   • Bleeds easily (HCC)     per pt and wife    • Bursitis     left hip    • CAD (coronary artery disease) 1/13/2017    5 stents in heart    • Chronic kidney failure     sees Dr Hicks every 6 months   •  Depression    • Diverticulosis 1/13/2017   • Dyslipidemia 1/13/2017   • Emphysema of lung (HCC)    • Hemorrhoid     needs surgery    • Hypertension 1/13/2017   • Low back pain     needs another back surgery    • Neuropathy 1/13/2017   • Pacemaker at end of battery life    • Palpitations     worsening since august 2017   • Restless leg syndrome    • Sleep apnea with use of continuous positive airway pressure (CPAP)    • Symptomatic bradycardia 1/13/2017   • TIA (transient ischemic attack)     Questionable TIA vs. seizure, June 2014. Negative head CT and carotid duplex.Residual left facial droop and speech impairment, July 2014.    • Tobacco use 1/13/2017   • Wears dentures     upper only    • Wears glasses        Social History     Tobacco Use   Smoking Status Current Every Day Smoker   • Packs/day: 0.25   • Years: 47.00   • Pack years: 11.75   • Types: Cigarettes   Smokeless Tobacco Never Used          Family History   Problem Relation Age of Onset   • Cancer Other    • Diabetes Other    • Alzheimer's disease Other    • Stroke Other    • Heart disease Other    • Hypertension Other    • Cancer Father        Review of Systems   Constitutional: Positive for malaise/fatigue. Negative for decreased appetite.   HENT: Negative for congestion and sore throat.    Eyes: Negative for blurred vision.   Cardiovascular: Positive for chest pain, dyspnea on exertion and palpitations.   Respiratory: Positive for shortness of breath. Negative for snoring.    Endocrine: Negative for cold intolerance and heat intolerance.   Hematologic/Lymphatic: Negative for adenopathy. Does not bruise/bleed easily.   Skin: Negative for itching, nail changes and skin cancer.   Musculoskeletal: Negative for arthritis and myalgias.   Gastrointestinal: Negative for abdominal pain, dysphagia and heartburn.   Genitourinary: Negative for bladder incontinence and frequency.   Neurological: Negative for dizziness, light-headedness, seizures and vertigo.  "  Psychiatric/Behavioral: Negative for altered mental status.   Allergic/Immunologic: Negative for environmental allergies and hives.       Diabetes- No  Thyroid- normal    Objective     /86 (BP Location: Left arm)   Pulse 69   Temp 97.3 °F (36.3 °C)   Ht 177.8 cm (70\")   Wt 125 kg (274 lb 9.6 oz)   BMI 39.40 kg/m²     Vitals and nursing note reviewed.   Constitutional:       Appearance: Healthy appearance. Not in distress.   Eyes:      Conjunctiva/sclera: Conjunctivae normal.      Pupils: Pupils are equal, round, and reactive to light.   HENT:      Head: Normocephalic.   Pulmonary:      Breath sounds: Normal breath sounds.   Cardiovascular:      PMI at left midclavicular line. Normal rate. Regular rhythm.      Murmurs: There is a systolic murmur.   Abdominal:      General: Bowel sounds are normal.      Palpations: Abdomen is soft.   Musculoskeletal: Normal range of motion.      Cervical back: Normal range of motion and neck supple. Skin:     General: Skin is warm and dry.   Neurological:      Mental Status: Alert, oriented to person, place, and time and oriented to person, place and time.           ECG 12 Lead    Date/Time: 11/30/2021 1:56 PM  Performed by: Sp Klein MD  Authorized by: Sp Klein MD   Comparison: compared with previous ECG from 4/13/2021  Similar to previous ECG  Rhythm: paced  Rate: normal  QRS axis: normal  Other findings: T wave abnormality    Clinical impression: abnormal EKG              Assessment/Plan   Patient's Body mass index is 39.4 kg/m². indicating that he is obese (BMI >30). Obesity-related health conditions include the following: obstructive sleep apnea, hypertension, coronary heart disease and dyslipidemias. Obesity is newly identified. BMI is is above average; BMI management plan is completed. We discussed low calorie, low carb based diet program, portion control and increasing exercise..     Diagnoses and all orders for this visit:    1. SSS (sick " sinus syndrome) (HCC) (Primary)  -     Adult Transthoracic Echo Complete W/ Cont if Necessary Per Protocol; Future    2. IHD (ischemic heart disease)  -     Stress Test With Myocardial Perfusion One Day; Future  -     Adult Transthoracic Echo Complete W/ Cont if Necessary Per Protocol; Future    3. Primary hypertension    4. Hypercholesteremia    5. Palpitations    6. Precordial pain  -     Stress Test With Myocardial Perfusion One Day; Future    7. Sleep apnea in adult    8. Smoking  -     nicotine (Nicotrol) 10 MG inhaler; Inhale 1 puff As Needed for Smoking Cessation.  Dispense: 168 each; Refill: 6    9. Metabolic syndrome    10. Nodular adrenal cortex (HCC)    11. Silent myocardial ischemia  -     Stress Test With Myocardial Perfusion One Day; Future    12. Shortness of breath  -     Adult Transthoracic Echo Complete W/ Cont if Necessary Per Protocol; Future    At baseline his heart rate is stable.  His blood pressure is normal.  His EKG done today shows atrial pacing and ventricular sensing.  His clinical examination reveals a BMI of 39.  He does have a slightly loud second heart sound and a systolic murmur at the mitral area.  He does have slightly diminished peripheral pulses with no pedal edema.    His main problem is the palpitation and is being followed by the electrophysiologist Dr. Mae.  His last pacemaker check did not reveal any significant arrhythmia.  He'll be continued to be monitored by the electrophysiologist.  At this time continue Lopressor.    Regarding his ischemic heart disease, he apparently had numerous cardiac catheterization.  I'm not sure whether he had any stents placed in the past but none of the report shows any evidence.  Since he does have some chest pain I like to do a stress test in the form of Lexiscan to rule out any ischemia causing some of the problems including the chest pain    Regarding his shortness of breath, it could be related to his obesity as well as secondary to  his smoking.  I cannot rule out cardiac cause.  I schedule him to undergo an echocardiogram to evaluate the LV function, valvular structures as well as the PA pressure    Regarding his hypertension, it seems to be controlled with Lopressor at 25 mg once a day and he also takes a low-dose of diuretic in the form of Bumex.  He need his labs to be checked especially the renal function and electrolytes    Regarding the sleep apnea, he does uses a CPAP and oxygen.  Encouraged him to continue to use it.  I did talk to him about his weight.  I gave him papers on Mediterranean diet.  Hopefully losing weight might help with his CPAP and sleep apnea.    Regarding his smoking habit, his does have increased risk of developing complications including malignancy, vascular, stroke as well as COPD.  He and his wife are now willing to try to quit smoking.  I again talked to him about Nicotrol inhalers and prescription was given    Regarding his adrenal gland which was removed I'm not sure whether it is primary aldosteronism or pheochromocytoma.  At this time his blood pressure is stable.  He is advised to talk to you about the electrolytes    Regarding the shortness of breath, possibility of silent ischemia was also discussed with him and hopefully with the stress test will have an idea    At this time continue the other medications.  Based on the results of the echo and stress further management will be made.               Electronically signed by Sp Klein MD November 30, 2021 13:47 EST

## 2021-11-30 NOTE — PATIENT INSTRUCTIONS
For more information:    Quit Now [place]  1-800-QUIT-NOW  https://[place].quitlogix.org/en-[place]/  Steps to Quit Smoking  Smoking tobacco can be harmful to your health and can affect almost every organ in your body. Smoking puts you, and those around you, at risk for developing many serious chronic diseases. Quitting smoking is difficult, but it is one of the best things that you can do for your health. It is never too late to quit.  What are the benefits of quitting smoking?  When you quit smoking, you lower your risk of developing serious diseases and conditions, such as:  · Lung cancer or lung disease, such as COPD.  · Heart disease.  · Stroke.  · Heart attack.  · Infertility.  · Osteoporosis and bone fractures.  Additionally, symptoms such as coughing, wheezing, and shortness of breath may get better when you quit. You may also find that you get sick less often because your body is stronger at fighting off colds and infections. If you are pregnant, quitting smoking can help to reduce your chances of having a baby of low birth weight.  How do I get ready to quit?  When you decide to quit smoking, create a plan to make sure that you are successful. Before you quit:  · Pick a date to quit. Set a date within the next two weeks to give you time to prepare.  · Write down the reasons why you are quitting. Keep this list in places where you will see it often, such as on your bathroom mirror or in your car or wallet.  · Identify the people, places, things, and activities that make you want to smoke (triggers) and avoid them. Make sure to take these actions:  ¨ Throw away all cigarettes at home, at work, and in your car.  ¨ Throw away smoking accessories, such as ashtrays and lighters.  ¨ Clean your car and make sure to empty the ashtray.  ¨ Clean your home, including curtains and carpets.  · Tell your family, friends, and coworkers that you are quitting. Support from your loved ones can make quitting easier.  · Talk with  your health care provider about your options for quitting smoking.  · Find out what treatment options are covered by your health insurance.  What strategies can I use to quit smoking?  Talk with your healthcare provider about different strategies to quit smoking. Some strategies include:  · Quitting smoking altogether instead of gradually lessening how much you smoke over a period of time. Research shows that quitting “cold turkey” is more successful than gradually quitting.  · Attending in-person counseling to help you build problem-solving skills. You are more likely to have success in quitting if you attend several counseling sessions. Even short sessions of 10 minutes can be effective.  · Finding resources and support systems that can help you to quit smoking and remain smoke-free after you quit. These resources are most helpful when you use them often. They can include:  ¨ Online chats with a counselor.  ¨ Telephone quitlines.  ¨ Printed self-help materials.  ¨ Support groups or group counseling.  ¨ Text messaging programs.  ¨ Mobile phone applications.  · Taking medicines to help you quit smoking. (If you are pregnant or breastfeeding, talk with your health care provider first.) Some medicines contain nicotine and some do not. Both types of medicines help with cravings, but the medicines that include nicotine help to relieve withdrawal symptoms. Your health care provider may recommend:  ¨ Nicotine patches, gum, or lozenges.  ¨ Nicotine inhalers or sprays.  ¨ Non-nicotine medicine that is taken by mouth.  Talk with your health care provider about combining strategies, such as taking medicines while you are also receiving in-person counseling. Using these two strategies together makes you more likely to succeed in quitting than if you used either strategy on its own.  If you are pregnant or breastfeeding, talk with your health care provider about finding counseling or other support strategies to quit smoking. Do  not take medicine to help you quit smoking unless told to do so by your health care provider.  What things can I do to make it easier to quit?  Quitting smoking might feel overwhelming at first, but there is a lot that you can do to make it easier. Take these important actions:  · Reach out to your family and friends and ask that they support and encourage you during this time. Call telephone quitlines, reach out to support groups, or work with a counselor for support.  · Ask people who smoke to avoid smoking around you.  · Avoid places that trigger you to smoke, such as bars, parties, or smoke-break areas at work.  · Spend time around people who do not smoke.  · Lessen stress in your life, because stress can be a smoking trigger for some people. To lessen stress, try:  ¨ Exercising regularly.  ¨ Deep-breathing exercises.  ¨ Yoga.  ¨ Meditating.  ¨ Performing a body scan. This involves closing your eyes, scanning your body from head to toe, and noticing which parts of your body are particularly tense. Purposefully relax the muscles in those areas.  · Download or purchase mobile phone or tablet apps (applications) that can help you stick to your quit plan by providing reminders, tips, and encouragement. There are many free apps, such as QuitGuide from the CDC (Centers for Disease Control and Prevention). You can find other support for quitting smoking (smoking cessation) through smokefree.gov and other websites.  How will I feel when I quit smoking?  Within the first 24 hours of quitting smoking, you may start to feel some withdrawal symptoms. These symptoms are usually most noticeable 2-3 days after quitting, but they usually do not last beyond 2-3 weeks. Changes or symptoms that you might experience include:  · Mood swings.  · Restlessness, anxiety, or irritation.  · Difficulty concentrating.  · Dizziness.  · Strong cravings for sugary foods in addition to nicotine.  · Mild weight  gain.  · Constipation.  · Nausea.  · Coughing or a sore throat.  · Changes in how your medicines work in your body.  · A depressed mood.  · Difficulty sleeping (insomnia).  After the first 2-3 weeks of quitting, you may start to notice more positive results, such as:  · Improved sense of smell and taste.  · Decreased coughing and sore throat.  · Slower heart rate.  · Lower blood pressure.  · Clearer skin.  · The ability to breathe more easily.  · Fewer sick days.  Quitting smoking is very challenging for most people. Do not get discouraged if you are not successful the first time. Some people need to make many attempts to quit before they achieve long-term success. Do your best to stick to your quit plan, and talk with your health care provider if you have any questions or concerns.  This information is not intended to replace advice given to you by your health care provider. Make sure you discuss any questions you have with your health care provider.  Document Released: 12/12/2002 Document Revised: 08/15/2017 Document Reviewed: 05/03/2016  [employer] Patient Education © 2017 [employer]  Mediterranean Diet  A Mediterranean diet refers to food and lifestyle choices that are based on the traditions of countries located on the [place]. This way of eating has been shown to help prevent certain conditions and improve outcomes for people who have chronic diseases, like kidney disease and heart disease.  What are tips for following this plan?  Lifestyle  · Cook and eat meals together with your family, when possible.  · Drink enough fluid to keep your urine clear or pale yellow.  · Be physically active every day. This includes:  ? Aerobic exercise like running or swimming.  ? Leisure activities like gardening, walking, or housework.  · Get 7-8 hours of sleep each night.  · If recommended by your health care provider, drink red wine in moderation. This means 1 glass a day for nonpregnant women and 2  glasses a day for men. A glass of wine equals 5 oz (150 mL).  Reading food labels    · Check the serving size of packaged foods. For foods such as rice and pasta, the serving size refers to the amount of cooked product, not dry.  · Check the total fat in packaged foods. Avoid foods that have saturated fat or trans fats.  · Check the ingredients list for added sugars, such as corn syrup.    Shopping  · At the grocery store, buy most of your food from the areas near the walls of the store. This includes:  ? Fresh fruits and vegetables (produce).  ? Grains, beans, nuts, and seeds. Some of these may be available in unpackaged forms or large amounts (in bulk).  ? Fresh seafood.  ? Poultry and eggs.  ? Low-fat dairy products.  · Buy whole ingredients instead of prepackaged foods.  · Buy fresh fruits and vegetables in-season from local [employer] markets.  · Buy frozen fruits and vegetables in resealable bags.  · If you do not have access to quality fresh seafood, buy precooked frozen shrimp or canned fish, such as tuna, salmon, or sardines.  · Buy small amounts of raw or cooked vegetables, salads, or olives from the deli or salad bar at your store.  · Stock your pantry so you always have certain foods on hand, such as olive oil, canned tuna, canned tomatoes, rice, pasta, and beans.  Cooking  · Cook foods with extra-virgin olive oil instead of using butter or other vegetable oils.  · Have meat as a side dish, and have vegetables or grains as your main dish. This means having meat in small portions or adding small amounts of meat to foods like pasta or stew.  · Use beans or vegetables instead of meat in common dishes like chili or lasagna.  · [place] with different cooking methods. Try roasting or broiling vegetables instead of steaming or sautéeing them.  · Add frozen vegetables to soups, stews, pasta, or rice.  · Add nuts or seeds for added healthy fat at each meal. You can add these to yogurt, salads, or vegetable  dishes.  · Marinate fish or vegetables using olive oil, lemon juice, garlic, and fresh herbs.  Meal planning    · Plan to eat 1 vegetarian meal one day each week. Try to work up to 2 vegetarian meals, if possible.  · Eat seafood 2 or more times a week.  · Have healthy snacks readily available, such as:  ? Vegetable sticks with hummus.  ? Greek yogurt.  ? Fruit and nut trail mix.  · Eat balanced meals throughout the week. This includes:  ? Fruit: 2-3 servings a day  ? Vegetables: 4-5 servings a day  ? Low-fat dairy: 2 servings a day  ? Fish, poultry, or lean meat: 1 serving a day  ? Beans and legumes: 2 or more servings a week  ? Nuts and seeds: 1-2 servings a day  ? Whole grains: 6-8 servings a day  ? Extra-virgin olive oil: 3-4 servings a day  · Limit red meat and sweets to only a few servings a month    What are my food choices?  · Mediterranean diet  ? Recommended  § Grains: Whole-grain pasta. Brown rice. Bulgar wheat. Polenta. Couscous. Whole-wheat bread. Oatmeal. Quinoa.  § Vegetables: Artichokes. Beets. Broccoli. Cabbage. Carrots. Eggplant. Green beans. Chard. Kale. Spinach. Onions. Leeks. Peas. Squash. Tomatoes. Peppers. Radishes.  § Fruits: Apples. Apricots. Avocado. Berries. Bananas. Cherries. Dates. Figs. Grapes. Parul. Melon. Oranges. Peaches. Plums. Pomegranate.  § Meats and other protein foods: Beans. Almonds. Sunflower seeds. Pine nuts. Peanuts. Cod. [place]. Scallops. Shrimp. Tuna. Tilapia. Clams. Oysters. Eggs.  § Dairy: Low-fat milk. Cheese. Greek yogurt.  § Beverages: Water. Red wine. Herbal tea.  § Fats and oils: Extra virgin olive oil. Avocado oil. Grape seed oil.  § Sweets and desserts: Greek yogurt with honey. Baked apples. Poached pears. Trail mix.  § Seasoning and other foods: Basil. Cilantro. Coriander. Cumin. Mint. Parsley. Aj. Rosemary. Tarragon. Garlic. Oregano. Thyme. Pepper. Balsalmic vinegar. Tahini. Hummus. Tomato sauce. Olives. Mushrooms.  ? Limit these  § Grains: Prepackaged pasta  or rice dishes. Prepackaged cereal with added sugar.  § Vegetables: Deep fried potatoes (french fries).  § Fruits: Fruit canned in syrup.  § Meats and other protein foods: Beef. Pork. Lamb. Poultry with skin. Hot dogs. [place].  § Dairy: Ice cream. Sour cream. Whole milk.  § Beverages: Juice. Sugar-sweetened soft drinks. Beer. Liquor and spirits.  § Fats and oils: Butter. Canola oil. Vegetable oil. Beef fat (tallow). Lard.  § Sweets and desserts: Cookies. Cakes. Pies. Candy.  § Seasoning and other foods: Mayonnaise. Premade sauces and marinades.  The items listed may not be a complete list. Talk with your dietitian about what dietary choices are right for you.  Summary  · The Mediterranean diet includes both food and lifestyle choices.  · Eat a variety of fresh fruits and vegetables, beans, nuts, seeds, and whole grains.  · Limit the amount of red meat and sweets that you eat.  · Talk with your health care provider about whether it is safe for you to drink red wine in moderation. This means 1 glass a day for nonpregnant women and 2 glasses a day for men. A glass of wine equals 5 oz (150 mL).  This information is not intended to replace advice given to you by your health care provider. Make sure you discuss any questions you have with your health care provider.  Document Revised: 08/17/2017 Document Reviewed: 08/10/2017  [employer]vier Patient Education © 2020 [employer]

## 2021-11-30 NOTE — PROGRESS NOTES
Cecilio Fierro  reports that he has been smoking cigarettes. He has a 11.75 pack-year smoking history. He has never used smokeless tobacco.. I have educated him on the risk of diseases from using tobacco products such as cancer, COPD and heart disease.     I advised him to quit and he is willing to quit. We have discussed the following method/s for tobacco cessation:  Education Material Counseling Prescription Medicaiton.  Together we have set a quit date for 1 week from today.  He will follow up with me in 6 months or sooner to check on his progress.    I spent 3  minutes counseling the patient.

## 2021-12-02 ENCOUNTER — TELEPHONE (OUTPATIENT)
Dept: PSYCHIATRY | Facility: CLINIC | Age: 66
End: 2021-12-02

## 2021-12-02 NOTE — TELEPHONE ENCOUNTER
Attempted to inform patient that Xanax was too early to fill and that he needed to call back a couple of days before it's due per Dr. Almonte.

## 2021-12-06 DIAGNOSIS — F41.8 DEPRESSION WITH ANXIETY: ICD-10-CM

## 2021-12-06 RX ORDER — ALPRAZOLAM 2 MG/1
TABLET, EXTENDED RELEASE ORAL
Qty: 30 TABLET | Refills: 0 | Status: SHIPPED | OUTPATIENT
Start: 2021-12-06 | End: 2021-12-13 | Stop reason: SDUPTHER

## 2021-12-13 ENCOUNTER — OFFICE VISIT (OUTPATIENT)
Dept: PSYCHIATRY | Facility: CLINIC | Age: 66
End: 2021-12-13

## 2021-12-13 VITALS
HEART RATE: 72 BPM | DIASTOLIC BLOOD PRESSURE: 80 MMHG | WEIGHT: 276 LBS | BODY MASS INDEX: 39.6 KG/M2 | SYSTOLIC BLOOD PRESSURE: 134 MMHG

## 2021-12-13 DIAGNOSIS — F33.40 RECURRENT MAJOR DEPRESSIVE DISORDER, IN REMISSION (HCC): Primary | ICD-10-CM

## 2021-12-13 DIAGNOSIS — F41.8 DEPRESSION WITH ANXIETY: ICD-10-CM

## 2021-12-13 PROCEDURE — 99214 OFFICE O/P EST MOD 30 MIN: CPT | Performed by: PSYCHIATRY & NEUROLOGY

## 2021-12-13 RX ORDER — DOXEPIN HYDROCHLORIDE 50 MG/1
CAPSULE ORAL
Qty: 60 CAPSULE | Refills: 2 | Status: SHIPPED | OUTPATIENT
Start: 2021-12-13 | End: 2022-03-03 | Stop reason: SDUPTHER

## 2021-12-13 RX ORDER — DULOXETIN HYDROCHLORIDE 60 MG/1
CAPSULE, DELAYED RELEASE ORAL
Qty: 180 CAPSULE | Refills: 0 | Status: SHIPPED | OUTPATIENT
Start: 2021-12-13 | End: 2022-03-03 | Stop reason: SDUPTHER

## 2021-12-13 RX ORDER — TRAZODONE HYDROCHLORIDE 100 MG/1
TABLET ORAL
Qty: 60 TABLET | Refills: 2 | Status: SHIPPED | OUTPATIENT
Start: 2021-12-13 | End: 2022-03-03 | Stop reason: SDUPTHER

## 2021-12-13 RX ORDER — ALPRAZOLAM 2 MG/1
TABLET, EXTENDED RELEASE ORAL
Qty: 30 TABLET | Refills: 2 | Status: SHIPPED | OUTPATIENT
Start: 2021-12-13 | End: 2022-03-03 | Stop reason: SDUPTHER

## 2021-12-13 NOTE — PROGRESS NOTES
"Patient ID: Cecilio Fierro is a 66 y.o. male    SERVICE TYPE: EVALUATION AND MANAGEMENT (greater than 50% of the time spent for supportive psychotherapy).      /80   Pulse 72   Wt 125 kg (276 lb)   BMI 39.60 kg/m²     ALLERGIES:  Morphine and Zofran [ondansetron hcl]    CC/ Focus of the visit: depression    HPI: \"Fine\".  Patient reports that he is doing well without clinically significant depressive symptomatology, interest and activities are within their norms, sleeps with current medication format.  Patient describes how he has been busy butchering deer for others and helping his wife who is currently having medical issues.  No indications of any substance abuse or prescription misuse.  No alcohol.    PFSH: All stable, still care for their 5-year-old grandson who is actually benefits them as much as the child.    Review of Systems  No cardiopulmonary, GI or neurological complaints.    SUPPORTIVE PSYCHOTHERAPY: continuing efforts to promote the therapeutic alliance, address the patient’s issues, and strengthen self awareness, insights, and positive coping skills such as Exercising, listen to music, spending time in nature and utilizing resources.     Mental Status Exam  Appearance:  appropriate  Attitude toward clinician:  cooperative and agreeable   Speech:    Rate:  regular rate and rhythm   Volume: normal  Motor:  no abnormal movements   Mood:  Good  Affect:  euthymic  Thought Processes:  linear, logical, and goal directed  Thought Content:  Normal   Feeling Hopeless: absent  Suicidal Thoughts or Intent:  absent  Homicidal Thoughts:  absent  Perceptual Disturbance: no perceptual disturbance  Attention and Concentration:  good  Insight and Judgement:  good  Memory:  memory appears to be intact    LABS: No results found for this or any previous visit (from the past 168 hour(s)).    MEDICATION ISSUES: Have discussed with the patient the medications Risks, Benefits, and Side effects including potential " falls, possible impaired driving and  metabolic adversities among others. No medication side effects or related complaints today.     TREATMENT PLAN/GOALS: Continue supportive psychotherapy efforts and medications as indicated.     Current Outpatient Medications   Medication Sig Dispense Refill   • ALPRAZolam XR (XANAX XR) 2 MG 24 hr tablet Take one at 8 PM daily  Indications: anxiety disorder 30 tablet 2   • aspirin 325 MG tablet Take 325 mg by mouth Daily.     • bumetanide (BUMEX) 1 MG tablet Take 1 mg by mouth 2 (Two) Times a Day.     • cetirizine (zyrTEC) 10 MG tablet Take 10 mg by mouth Daily.     • doxepin (SINEquan) 50 MG capsule TAKE 2 CAPSULES BY MOUTH ONCE A DAY AT BEDTIME 60 capsule 2   • DULoxetine (Cymbalta) 60 MG capsule Take one bid. 180 capsule 0   • folic acid (FOLVITE) 1 MG tablet Take 1 mg by mouth Daily.     • gabapentin (NEURONTIN) 400 MG capsule Take 400 mg by mouth 3 (Three) Times a Day.     • methocarbamol (ROBAXIN) 750 MG tablet Take 750 mg by mouth 2 (Two) Times a Day.     • metoprolol tartrate (LOPRESSOR) 25 MG tablet Take 1 tablet by mouth 2 (Two) Times a Day. 60 tablet 11   • montelukast (SINGULAIR) 10 MG tablet Take 10 mg by mouth Every Night.     • nicotine (Nicotrol) 10 MG inhaler Inhale 1 puff As Needed for Smoking Cessation. 168 each 6   • nitroglycerin (NITROSTAT) 0.4 MG SL tablet Place 0.4 mg under the tongue Every 5 (Five) Minutes As Needed for Chest Pain.     • omeprazole (priLOSEC) 40 MG capsule Take 40 mg by mouth Daily.     • pravastatin (PRAVACHOL) 40 MG tablet Take 40 mg by mouth Daily.     • promethazine (PHENERGAN) 25 MG tablet Take 25 mg by mouth Every 6 (Six) Hours As Needed for Nausea or Vomiting.     • raNITIdine (ZANTAC) 300 MG tablet Take 300 mg by mouth Every Night.     • rOPINIRole (REQUIP) 3 MG tablet Take 3 mg by mouth 2 (Two) Times a Day. Take 1 hour before bedtime.      • tamsulosin (FLOMAX) 0.4 MG capsule 24 hr capsule Take 1 capsule by mouth Daily.     •  traZODone (DESYREL) 100 MG tablet TAKE TWO TABLETS BY MOUTH AT BEDTIME FOR SLEEP 60 tablet 2   • Wixela Inhub 250-50 MCG/DOSE DISKUS Inhale 2 puffs As Needed.       No current facility-administered medications for this visit.       COLLATERAL PSYCHOTHERAPEUTIC INTERVENTION:  patient not interested in additional psychotherapy.    RISK: Moderate    Assessment/Plan     Diagnoses and all orders for this visit:    1. Recurrent major depressive disorder, in remission (HCC) (Primary)  -     traZODone (DESYREL) 100 MG tablet; TAKE TWO TABLETS BY MOUTH AT BEDTIME FOR SLEEP  Dispense: 60 tablet; Refill: 2  -     DULoxetine (Cymbalta) 60 MG capsule; Take one bid.  Dispense: 180 capsule; Refill: 0  -     doxepin (SINEquan) 50 MG capsule; TAKE 2 CAPSULES BY MOUTH ONCE A DAY AT BEDTIME  Dispense: 60 capsule; Refill: 2    2. Depression with anxiety  -     ALPRAZolam XR (XANAX XR) 2 MG 24 hr tablet; Take one at 8 PM daily  Indications: anxiety disorder  Dispense: 30 tablet; Refill: 2        Return in about 3 months (around 3/13/2022).           Patient knows to call if symptoms worsen or fail to improve between appointments.    I spent 30 minutes caring for Cecilio on this date of service. This time includes time spent by me in the following activities: Patient evaluation, support psychotherapy, decisions, medications, and documentation.     Dictated utilizing [employer] dictation    RICARDO Almonte MD

## 2021-12-30 ENCOUNTER — HOSPITAL ENCOUNTER (OUTPATIENT)
Dept: CARDIOLOGY | Facility: HOSPITAL | Age: 66
Discharge: HOME | End: 2021-12-30

## 2021-12-30 DIAGNOSIS — R06.02 SHORTNESS OF BREATH: ICD-10-CM

## 2021-12-30 DIAGNOSIS — I49.5 SSS (SICK SINUS SYNDROME) (HCC): ICD-10-CM

## 2021-12-30 DIAGNOSIS — I25.6 SILENT MYOCARDIAL ISCHEMIA: ICD-10-CM

## 2021-12-30 DIAGNOSIS — R07.2 PRECORDIAL PAIN: ICD-10-CM

## 2021-12-30 DIAGNOSIS — I25.9 IHD (ISCHEMIC HEART DISEASE): ICD-10-CM

## 2021-12-30 PROCEDURE — 93306 TTE W/DOPPLER COMPLETE: CPT | Performed by: INTERNAL MEDICINE

## 2021-12-30 PROCEDURE — 25010000002 REGADENOSON 0.4 MG/5ML SOLUTION: Performed by: INTERNAL MEDICINE

## 2021-12-30 PROCEDURE — 93306 TTE W/DOPPLER COMPLETE: CPT

## 2021-12-30 PROCEDURE — 78452 HT MUSCLE IMAGE SPECT MULT: CPT

## 2021-12-30 PROCEDURE — 93018 CV STRESS TEST I&R ONLY: CPT | Performed by: INTERNAL MEDICINE

## 2021-12-30 PROCEDURE — A9500 TC99M SESTAMIBI: HCPCS | Performed by: INTERNAL MEDICINE

## 2021-12-30 PROCEDURE — 78452 HT MUSCLE IMAGE SPECT MULT: CPT | Performed by: INTERNAL MEDICINE

## 2021-12-30 PROCEDURE — 0 TECHNETIUM SESTAMIBI: Performed by: INTERNAL MEDICINE

## 2021-12-30 PROCEDURE — 93017 CV STRESS TEST TRACING ONLY: CPT

## 2021-12-30 RX ADMIN — REGADENOSON 0.4 MG: 0.08 INJECTION, SOLUTION INTRAVENOUS at 10:25

## 2021-12-30 RX ADMIN — TECHNETIUM TC 99M SESTAMIBI 1 DOSE: 1 INJECTION INTRAVENOUS at 08:38

## 2021-12-30 RX ADMIN — TECHNETIUM TC 99M SESTAMIBI 1 DOSE: 1 INJECTION INTRAVENOUS at 10:25

## 2021-12-31 LAB
BH CV ECHO MEAS - ACS: 3.1 CM
BH CV ECHO MEAS - AO MAX PG: 4.2 MMHG
BH CV ECHO MEAS - AO MEAN PG: 2 MMHG
BH CV ECHO MEAS - AO ROOT AREA (BSA CORRECTED): 1.8
BH CV ECHO MEAS - AO ROOT AREA: 13.9 CM^2
BH CV ECHO MEAS - AO ROOT DIAM: 4.2 CM
BH CV ECHO MEAS - AO V2 MAX: 102 CM/SEC
BH CV ECHO MEAS - AO V2 MEAN: 70.5 CM/SEC
BH CV ECHO MEAS - AO V2 VTI: 22.2 CM
BH CV ECHO MEAS - BSA(HAYCOCK): 2.5 M^2
BH CV ECHO MEAS - BSA: 2.4 M^2
BH CV ECHO MEAS - BZI_BMI: 39.6 KILOGRAMS/M^2
BH CV ECHO MEAS - BZI_METRIC_HEIGHT: 177.8 CM
BH CV ECHO MEAS - BZI_METRIC_WEIGHT: 125.2 KG
BH CV ECHO MEAS - EDV(CUBED): 68.4 ML
BH CV ECHO MEAS - EDV(MOD-SP4): 107 ML
BH CV ECHO MEAS - EDV(TEICH): 73.8 ML
BH CV ECHO MEAS - EF(CUBED): 66.2 %
BH CV ECHO MEAS - EF(MOD-SP4): 55.2 %
BH CV ECHO MEAS - EF(TEICH): 58.2 %
BH CV ECHO MEAS - EF_3D-VOL: 60 %
BH CV ECHO MEAS - ESV(CUBED): 23.1 ML
BH CV ECHO MEAS - ESV(MOD-SP4): 47.9 ML
BH CV ECHO MEAS - ESV(TEICH): 30.9 ML
BH CV ECHO MEAS - FS: 30.3 %
BH CV ECHO MEAS - IVS/LVPW: 0.96
BH CV ECHO MEAS - IVSD: 1.9 CM
BH CV ECHO MEAS - LA DIMENSION: 4.8 CM
BH CV ECHO MEAS - LA/AO: 1.1
BH CV ECHO MEAS - LV DIASTOLIC VOL/BSA (35-75): 44.7 ML/M^2
BH CV ECHO MEAS - LV IVRT: 0.16 SEC
BH CV ECHO MEAS - LV MASS(C)D: 353.9 GRAMS
BH CV ECHO MEAS - LV MASS(C)DI: 147.9 GRAMS/M^2
BH CV ECHO MEAS - LV SYSTOLIC VOL/BSA (12-30): 20 ML/M^2
BH CV ECHO MEAS - LVIDD: 4.1 CM
BH CV ECHO MEAS - LVIDS: 2.9 CM
BH CV ECHO MEAS - LVLD AP4: 7.9 CM
BH CV ECHO MEAS - LVLS AP4: 6.9 CM
BH CV ECHO MEAS - LVOT AREA (M): 3.5 CM^2
BH CV ECHO MEAS - LVOT AREA: 3.5 CM^2
BH CV ECHO MEAS - LVOT DIAM: 2.1 CM
BH CV ECHO MEAS - LVPWD: 1.9 CM
BH CV ECHO MEAS - MV A MAX VEL: 59.1 CM/SEC
BH CV ECHO MEAS - MV DEC SLOPE: 287 CM/SEC^2
BH CV ECHO MEAS - MV E MAX VEL: 56.1 CM/SEC
BH CV ECHO MEAS - MV E/A: 0.95
BH CV ECHO MEAS - RVDD: 3.9 CM
BH CV ECHO MEAS - SI(AO): 128.5 ML/M^2
BH CV ECHO MEAS - SI(CUBED): 18.9 ML/M^2
BH CV ECHO MEAS - SI(MOD-SP4): 24.7 ML/M^2
BH CV ECHO MEAS - SI(TEICH): 17.9 ML/M^2
BH CV ECHO MEAS - SV(AO): 307.6 ML
BH CV ECHO MEAS - SV(CUBED): 45.3 ML
BH CV ECHO MEAS - SV(MOD-SP4): 59.1 ML
BH CV ECHO MEAS - SV(TEICH): 42.9 ML
BH CV REST NUCLEAR ISOTOPE DOSE: 10 MCI
BH CV STRESS COMMENTS STAGE 1: NORMAL
BH CV STRESS DOSE REGADENOSON STAGE 1: 0.4
BH CV STRESS DURATION MIN STAGE 1: 0
BH CV STRESS DURATION SEC STAGE 1: 10
BH CV STRESS NUCLEAR ISOTOPE DOSE: 30 MCI
BH CV STRESS PROTOCOL 1: NORMAL
BH CV STRESS RECOVERY BP: NORMAL MMHG
BH CV STRESS RECOVERY HR: 83 BPM
BH CV STRESS STAGE 1: 1
LV EF NUC BP: 50 %
MAXIMAL PREDICTED HEART RATE: 156 BPM
MAXIMAL PREDICTED HEART RATE: 156 BPM
PERCENT MAX PREDICTED HR: 50 %
STRESS BASELINE BP: NORMAL MMHG
STRESS BASELINE HR: 70 BPM
STRESS PERCENT HR: 59 %
STRESS POST PEAK BP: NORMAL MMHG
STRESS POST PEAK HR: 78 BPM
STRESS TARGET HR: 133 BPM
STRESS TARGET HR: 133 BPM

## 2022-01-05 ENCOUNTER — TRANSCRIBE ORDERS (OUTPATIENT)
Dept: ADMINISTRATIVE | Facility: HOSPITAL | Age: 67
End: 2022-01-05

## 2022-01-05 DIAGNOSIS — Z87.891 PERSONAL HISTORY OF TOBACCO USE, PRESENTING HAZARDS TO HEALTH: Primary | ICD-10-CM

## 2022-01-27 ENCOUNTER — HOSPITAL ENCOUNTER (OUTPATIENT)
Dept: CT IMAGING | Facility: HOSPITAL | Age: 67
Discharge: HOME | End: 2022-01-27
Admitting: FAMILY MEDICINE

## 2022-01-27 DIAGNOSIS — Z87.891 PERSONAL HISTORY OF TOBACCO USE, PRESENTING HAZARDS TO HEALTH: ICD-10-CM

## 2022-01-27 PROCEDURE — 71271 CT THORAX LUNG CANCER SCR C-: CPT

## 2022-01-27 PROCEDURE — 71271 CT THORAX LUNG CANCER SCR C-: CPT | Performed by: RADIOLOGY

## 2022-02-01 ENCOUNTER — TELEPHONE (OUTPATIENT)
Dept: PSYCHIATRY | Facility: CLINIC | Age: 67
End: 2022-02-01

## 2022-02-11 PROCEDURE — 93294 REM INTERROG EVL PM/LDLS PM: CPT | Performed by: STUDENT IN AN ORGANIZED HEALTH CARE EDUCATION/TRAINING PROGRAM

## 2022-02-11 PROCEDURE — 93296 REM INTERROG EVL PM/IDS: CPT | Performed by: STUDENT IN AN ORGANIZED HEALTH CARE EDUCATION/TRAINING PROGRAM

## 2022-03-03 ENCOUNTER — OFFICE VISIT (OUTPATIENT)
Dept: PSYCHIATRY | Facility: CLINIC | Age: 67
End: 2022-03-03

## 2022-03-03 VITALS
SYSTOLIC BLOOD PRESSURE: 122 MMHG | HEIGHT: 70 IN | HEART RATE: 98 BPM | DIASTOLIC BLOOD PRESSURE: 81 MMHG | WEIGHT: 292.4 LBS | BODY MASS INDEX: 41.86 KG/M2

## 2022-03-03 DIAGNOSIS — F33.40 RECURRENT MAJOR DEPRESSIVE DISORDER, IN REMISSION: Primary | ICD-10-CM

## 2022-03-03 DIAGNOSIS — Z79.899 MEDICATION MANAGEMENT: ICD-10-CM

## 2022-03-03 PROCEDURE — 99214 OFFICE O/P EST MOD 30 MIN: CPT | Performed by: PSYCHIATRY & NEUROLOGY

## 2022-03-03 RX ORDER — ALLOPURINOL 300 MG/1
TABLET ORAL
COMMUNITY
Start: 2022-01-03

## 2022-03-03 RX ORDER — TRAZODONE HYDROCHLORIDE 100 MG/1
TABLET ORAL
Qty: 180 TABLET | Refills: 1 | Status: SHIPPED | OUTPATIENT
Start: 2022-03-03 | End: 2022-08-16 | Stop reason: SDUPTHER

## 2022-03-03 RX ORDER — DULOXETIN HYDROCHLORIDE 60 MG/1
CAPSULE, DELAYED RELEASE ORAL
Qty: 180 CAPSULE | Refills: 1 | Status: SHIPPED | OUTPATIENT
Start: 2022-03-03 | End: 2022-08-16 | Stop reason: SDUPTHER

## 2022-03-03 RX ORDER — DOXEPIN HYDROCHLORIDE 50 MG/1
CAPSULE ORAL
Qty: 90 CAPSULE | Refills: 1 | Status: SHIPPED | OUTPATIENT
Start: 2022-03-03 | End: 2022-06-10 | Stop reason: SDUPTHER

## 2022-03-03 RX ORDER — FLUTICASONE PROPIONATE 50 MCG
SPRAY, SUSPENSION (ML) NASAL
COMMUNITY
Start: 2022-01-25

## 2022-03-03 RX ORDER — ALPRAZOLAM 2 MG/1
TABLET, EXTENDED RELEASE ORAL
Qty: 90 TABLET | Refills: 1 | Status: SHIPPED | OUTPATIENT
Start: 2022-03-03 | End: 2022-08-16 | Stop reason: SDUPTHER

## 2022-03-03 NOTE — PROGRESS NOTES
"Patient ID: Cecilio Fierro is a 67 y.o. male    SERVICE TYPE: EVALUATION AND MANAGEMENT (greater than 50% of the time spent for supportive psychotherapy).      /81   Pulse 98   Ht 177.8 cm (70\")   Wt 133 kg (292 lb 6.4 oz)   BMI 41.96 kg/m²     ALLERGIES:  Morphine and Zofran [ondansetron hcl]    CC/ Focus of the visit: Depression    HPI: Patient reports that he is doing well without recurrent depression;, interest activities relationships sleep all at irrelative.  Patient has taken.  Bodybuilding regular exercise at the local gym, same.  Very beneficial for him emotion.  Still enjoys his close relationship with his grandson.    PFSH: Home situation stable    Review of Systems  No cardiopulmonary, GI or neurological complaints.    SUPPORTIVE PSYCHOTHERAPY: continuing efforts to promote the therapeutic alliance, address the patient’s issues, and strengthen self awareness, insights, and positive coping skills such as Exercising, listen to music, spending time in nature and utilizing resources.     Mental Status Exam  Appearance:  appropriate  Attitude toward clinician:  cooperative and agreeable   Speech:    Rate:  regular rate and rhythm   Volume: normal  Motor:  no abnormal movements   Mood:  Good  Affect:  euthymic  Thought Processes:  linear, logical, and goal directed  Thought Content:  Normal   Feeling Hopeless: absent  Suicidal Thoughts or Intent:  absent  Homicidal Thoughts:  absent  Perceptual Disturbance: no perceptual disturbance  Attention and Concentration:  good  Insight and Judgement:  good  Memory:  memory appears to be intact    LABS:   Recent Results (from the past 168 hour(s))   [facility] Drug Screen    Collection Time: 03/03/22  1:37 PM    Specimen: Urine, Clean Catch   Result Value Ref Range    External Amphetamine Screen Urine Negative     Amphetamine Cut-Off 1000ng/ml     External Benzodiazepine Screen Urine Positive     Benzodiazipine Cut-Off 300ng/ml     External Cocaine Screen Urine " Negative     Cocaine Cut-Off 300ng/ml     External THC Screen Urine Negative     THC Cut-Off 50ng/ml     External Methadone Screen Urine Negative     Methadone Cut-Off 300ng/ml     External Methamphetamine Screen Urine Negative     Methamphetamine Cut-Off 1000ng/ml     External Oxycodone Screen Urine Negative     Oxycodone Cut-Off 100ng/ml     External Buprenorphine Screen Urine Negative     Buprenorphine Cut-Off 10ng/ml     External MDMA Negative     MDMA Cut-Off 500ng/ml     External Opiates Screen Urine Negative     Opiates Cut-Off 300ng/ml        MEDICATION ISSUES: Have discussed with the patient the medications Risks, Benefits, and Side effects including potential falls, possible impaired driving and  metabolic adversities among others. No medication side effects or related complaints today.     TREATMENT PLAN/GOALS: Continue supportive psychotherapy efforts and medications as indicated.     Current Outpatient Medications   Medication Sig Dispense Refill   • allopurinol (ZYLOPRIM) 300 MG tablet TAKE 1 TABLET BY MOUTH ONCE A DAY FOR GOUT     • ALPRAZolam XR (XANAX XR) 2 MG 24 hr tablet Take one at 8 PM daily  Indications: anxiety disorder 90 tablet 1   • aspirin 325 MG tablet Take 325 mg by mouth Daily.     • bumetanide (BUMEX) 1 MG tablet Take 1 mg by mouth 2 (Two) Times a Day.     • cetirizine (zyrTEC) 10 MG tablet Take 10 mg by mouth Daily.     • doxepin (SINEquan) 50 MG capsule TAKE 2 CAPSULES BY MOUTH ONCE A DAY AT BEDTIME 90 capsule 1   • DULoxetine (Cymbalta) 60 MG capsule Take one bid. 180 capsule 1   • fluticasone (FLONASE) 50 MCG/ACT nasal spray SPRAY 2 SPRAYS INTO EACH NOSTRIL ONCE A DAY AS DIRECTED BY YOUR PRESCRIBER     • folic acid (FOLVITE) 1 MG tablet Take 1 mg by mouth Daily.     • gabapentin (NEURONTIN) 400 MG capsule Take 400 mg by mouth 3 (Three) Times a Day.     • methocarbamol (ROBAXIN) 750 MG tablet Take 750 mg by mouth 2 (Two) Times a Day.     • metoprolol tartrate (LOPRESSOR) 25 MG  tablet Take 1 tablet by mouth 2 (Two) Times a Day. 60 tablet 11   • montelukast (SINGULAIR) 10 MG tablet Take 10 mg by mouth Every Night.     • nicotine (Nicotrol) 10 MG inhaler Inhale 1 puff As Needed for Smoking Cessation. 168 each 6   • nitroglycerin (NITROSTAT) 0.4 MG SL tablet Place 0.4 mg under the tongue Every 5 (Five) Minutes As Needed for Chest Pain.     • omeprazole (priLOSEC) 40 MG capsule Take 40 mg by mouth Daily.     • pravastatin (PRAVACHOL) 40 MG tablet Take 40 mg by mouth Daily.     • promethazine (PHENERGAN) 25 MG tablet Take 25 mg by mouth Every 6 (Six) Hours As Needed for Nausea or Vomiting.     • raNITIdine (ZANTAC) 300 MG tablet Take 300 mg by mouth Every Night.     • rOPINIRole (REQUIP) 3 MG tablet Take 3 mg by mouth 2 (Two) Times a Day. Take 1 hour before bedtime.      • tamsulosin (FLOMAX) 0.4 MG capsule 24 hr capsule Take 1 capsule by mouth Daily.     • traZODone (DESYREL) 100 MG tablet TAKE TWO TABLETS BY MOUTH AT BEDTIME FOR SLEEP 180 tablet 1   • Wixela Inhub 250-50 MCG/DOSE DISKUS Inhale 2 puffs As Needed.       No current facility-administered medications for this visit.       COLLATERAL PSYCHOTHERAPEUTIC INTERVENTION:  patient not interested in additional psychotherapy.    RISK: Moderate    Assessment/Plan     Diagnoses and all orders for this visit:    1. Recurrent major depressive disorder, in remission (HCC) (Primary)  -     ALPRAZolam XR (XANAX XR) 2 MG 24 hr tablet; Take one at 8 PM daily  Indications: anxiety disorder  Dispense: 90 tablet; Refill: 1  -     traZODone (DESYREL) 100 MG tablet; TAKE TWO TABLETS BY MOUTH AT BEDTIME FOR SLEEP  Dispense: 180 tablet; Refill: 1  -     DULoxetine (Cymbalta) 60 MG capsule; Take one bid.  Dispense: 180 capsule; Refill: 1  -     doxepin (SINEquan) 50 MG capsule; TAKE 2 CAPSULES BY MOUTH ONCE A DAY AT BEDTIME  Dispense: 90 capsule; Refill: 1    2. Medication management  -     [facility] Drug Screen  -     ALPRAZolam XR (XANAX XR) 2 MG 24 hr  tablet; Take one at 8 PM daily  Indications: anxiety disorder  Dispense: 90 tablet; Refill: 1        Return in about 6 months (around 9/3/2022).           Patient knows to call if symptoms worsen or fail to improve between appointments.    I spent 30 minutes caring for Cecilio on this date of service. This time includes time spent by me in the following activities: Patient evaluation, support psychotherapy, decisions, medications, and documentation.     Dictated utilizing [employer]. dictation    RICARDO Almonte MD

## 2022-03-04 ENCOUNTER — TELEPHONE (OUTPATIENT)
Dept: PSYCHIATRY | Facility: CLINIC | Age: 67
End: 2022-03-04

## 2022-05-13 PROCEDURE — 93294 REM INTERROG EVL PM/LDLS PM: CPT | Performed by: STUDENT IN AN ORGANIZED HEALTH CARE EDUCATION/TRAINING PROGRAM

## 2022-05-13 PROCEDURE — 93296 REM INTERROG EVL PM/IDS: CPT | Performed by: STUDENT IN AN ORGANIZED HEALTH CARE EDUCATION/TRAINING PROGRAM

## 2022-06-07 DIAGNOSIS — F33.40 RECURRENT MAJOR DEPRESSIVE DISORDER, IN REMISSION: ICD-10-CM

## 2022-06-07 RX ORDER — DOXEPIN HYDROCHLORIDE 50 MG/1
CAPSULE ORAL
Qty: 90 CAPSULE | Refills: 1 | OUTPATIENT
Start: 2022-06-07

## 2022-06-09 DIAGNOSIS — F33.40 RECURRENT MAJOR DEPRESSIVE DISORDER, IN REMISSION: ICD-10-CM

## 2022-06-10 RX ORDER — DOXEPIN HYDROCHLORIDE 50 MG/1
CAPSULE ORAL
Qty: 90 CAPSULE | Refills: 1 | OUTPATIENT
Start: 2022-06-10

## 2022-06-10 NOTE — TELEPHONE ENCOUNTER
The pharmacy called back stating that Cecilio does not have any refills left. They said the prescription with the refill included was written for a total of 90 day supply.

## 2022-06-13 RX ORDER — DOXEPIN HYDROCHLORIDE 50 MG/1
CAPSULE ORAL
Qty: 90 CAPSULE | Refills: 0 | Status: SHIPPED | OUTPATIENT
Start: 2022-06-13 | End: 2022-08-01 | Stop reason: SDUPTHER

## 2022-08-01 DIAGNOSIS — F33.40 RECURRENT MAJOR DEPRESSIVE DISORDER, IN REMISSION: ICD-10-CM

## 2022-08-01 RX ORDER — DOXEPIN HYDROCHLORIDE 50 MG/1
CAPSULE ORAL
Qty: 90 CAPSULE | Refills: 0 | Status: SHIPPED | OUTPATIENT
Start: 2022-08-01 | End: 2022-08-16 | Stop reason: SDUPTHER

## 2022-08-16 ENCOUNTER — OFFICE VISIT (OUTPATIENT)
Dept: PSYCHIATRY | Facility: CLINIC | Age: 67
End: 2022-08-16

## 2022-08-16 VITALS
SYSTOLIC BLOOD PRESSURE: 130 MMHG | DIASTOLIC BLOOD PRESSURE: 78 MMHG | BODY MASS INDEX: 38.22 KG/M2 | HEIGHT: 70 IN | HEART RATE: 70 BPM | WEIGHT: 267 LBS

## 2022-08-16 DIAGNOSIS — F33.40 RECURRENT MAJOR DEPRESSIVE DISORDER, IN REMISSION: ICD-10-CM

## 2022-08-16 DIAGNOSIS — Z79.899 MEDICATION MANAGEMENT: ICD-10-CM

## 2022-08-16 PROCEDURE — 99214 OFFICE O/P EST MOD 30 MIN: CPT | Performed by: PSYCHIATRY & NEUROLOGY

## 2022-08-16 RX ORDER — DOXEPIN HYDROCHLORIDE 50 MG/1
CAPSULE ORAL
Qty: 90 CAPSULE | Refills: 0 | Status: SHIPPED | OUTPATIENT
Start: 2022-08-16 | End: 2022-10-31

## 2022-08-16 RX ORDER — GABAPENTIN 800 MG/1
TABLET ORAL
COMMUNITY
Start: 2022-08-01

## 2022-08-16 RX ORDER — FEXOFENADINE HCL 180 MG/1
TABLET ORAL
COMMUNITY
Start: 2022-08-08

## 2022-08-16 RX ORDER — DULOXETIN HYDROCHLORIDE 60 MG/1
CAPSULE, DELAYED RELEASE ORAL
Qty: 180 CAPSULE | Refills: 1 | Status: SHIPPED | OUTPATIENT
Start: 2022-08-16 | End: 2023-01-30 | Stop reason: SDUPTHER

## 2022-08-16 RX ORDER — TRAZODONE HYDROCHLORIDE 100 MG/1
TABLET ORAL
Qty: 90 TABLET | Refills: 1 | Status: SHIPPED | OUTPATIENT
Start: 2022-08-16 | End: 2023-01-30 | Stop reason: SDUPTHER

## 2022-08-16 RX ORDER — ALFUZOSIN HYDROCHLORIDE 10 MG/1
TABLET, EXTENDED RELEASE ORAL
COMMUNITY
Start: 2022-06-27

## 2022-08-16 RX ORDER — ALPRAZOLAM 2 MG/1
TABLET, EXTENDED RELEASE ORAL
Qty: 90 TABLET | Refills: 1 | Status: SHIPPED | OUTPATIENT
Start: 2022-08-16 | End: 2023-01-30 | Stop reason: SDUPTHER

## 2022-08-16 NOTE — PROGRESS NOTES
"Patient ID: Cecilio Fierro is a 67 y.o. male    SERVICE TYPE: EVALUATION AND MANAGEMENT (greater than 50% of the time spent for supportive psychotherapy).      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  His affect has been stable, no drinking, increased activity sleep at irrelative norms while continuing to take the medication.  Activities include:Gym daily, garden work, cooks, caring for wife.   He did bring  6 tomatoes from his garden to give to me today..  No indication of any substance abuse or prescription misuse.  Will be reducing the dose of the bedtime trazodone to 100 mg daily.    PFSH: Right now has functioned well taking over household tasks during his wife's convalescence.    Review of Systems  No cardiopulmonary, GI or neurological complaints.  Scheduled for a colonoscopy.     SUPPORTIVE PSYCHOTHERAPY: continuing efforts to promote the therapeutic alliance, address the patient’s issues, and strengthen self awareness, insights, and positive coping skills such as Exercising, listen to music, spending time in nature and utilizing resources.     Mental Status Exam  Appearance:  appropriate  Attitude toward clinician:  cooperative and agreeable   Speech:    Rate:  regular rate and rhythm   Volume: normal  Motor:  no abnormal movements   Mood:  Good  Affect:  euthymic  Thought Processes:  linear, logical, and goal directed  Thought Content:  Normal   Feeling Hopeless: absent  Suicidal Thoughts or Intent:  absent  Homicidal Thoughts:  absent  Perceptual Disturbance: no perceptual disturbance  Attention and Concentration:  good  Insight and Judgement:  good  Memory:  memory appears to be intact    LABS: No results found for this or any previous " visit (from the past 168 hour(s)).    MEDICATION ISSUES: Have discussed with the patient the medications Risks, Benefits, and Side effects including potential falls, possible impaired driving and  metabolic adversities among others. No medication side effects or related complaints today.     TREATMENT PLAN/GOALS: Continue supportive psychotherapy efforts and medications as indicated.     Current Outpatient Medications   Medication Sig Dispense Refill   • alfuzosin (UROXATRAL) 10 MG 24 hr tablet TAKE 1 TABLET BY MOUTH ONCE A DAY AFTER THE LAST MEAL OF THE DAY FOR PROSTATE     • allopurinol (ZYLOPRIM) 300 MG tablet TAKE 1 TABLET BY MOUTH ONCE A DAY FOR GOUT     • ALPRAZolam XR (XANAX XR) 2 MG 24 hr tablet Take one at 8 PM daily  Indications: anxiety disorder 90 tablet 1   • aspirin 325 MG tablet Take 325 mg by mouth Daily.     • bumetanide (BUMEX) 1 MG tablet Take 1 mg by mouth 2 (Two) Times a Day.     • cetirizine (zyrTEC) 10 MG tablet Take 10 mg by mouth Daily.     • doxepin (SINEquan) 50 MG capsule TAKE 2 CAPSULES BY MOUTH ONCE A DAY AT BEDTIME 90 capsule 0   • DULoxetine (Cymbalta) 60 MG capsule Take one bid. 180 capsule 1   • fexofenadine (ALLEGRA) 180 MG tablet      • fluticasone (FLONASE) 50 MCG/ACT nasal spray SPRAY 2 SPRAYS INTO EACH NOSTRIL ONCE A DAY AS DIRECTED BY YOUR PRESCRIBER     • folic acid (FOLVITE) 1 MG tablet Take 1 mg by mouth Daily.     • gabapentin (NEURONTIN) 400 MG capsule Take 400 mg by mouth 3 (Three) Times a Day.     • gabapentin (NEURONTIN) 800 MG tablet TAKE 1 TABLET BY MOUTH THREE TIMES A DAY FOR NERVE PAIN (MAY CAUSE DROWSINESS)     • methocarbamol (ROBAXIN) 750 MG tablet Take 750 mg by mouth 2 (Two) Times a Day.     • metoprolol tartrate (LOPRESSOR) 25 MG tablet Take 1 tablet by mouth 2 (Two) Times a Day. 180 tablet 3   • montelukast (SINGULAIR) 10 MG tablet Take 10 mg by mouth Every Night.     • nicotine (Nicotrol) 10 MG inhaler Inhale 1 puff As Needed for Smoking Cessation. 168 each  6   • nitroglycerin (NITROSTAT) 0.4 MG SL tablet Place 0.4 mg under the tongue Every 5 (Five) Minutes As Needed for Chest Pain.     • omeprazole (priLOSEC) 40 MG capsule Take 40 mg by mouth Daily.     • pravastatin (PRAVACHOL) 40 MG tablet Take 40 mg by mouth Daily.     • promethazine (PHENERGAN) 25 MG tablet Take 25 mg by mouth As Needed for Nausea or Vomiting.     • raNITIdine (ZANTAC) 300 MG tablet Take 300 mg by mouth Every Night.     • rOPINIRole (REQUIP) 3 MG tablet Take 3 mg by mouth 2 (Two) Times a Day. Take 1 hour before bedtime.      • tamsulosin (FLOMAX) 0.4 MG capsule 24 hr capsule Take 1 capsule by mouth Daily.     • traZODone (DESYREL) 100 MG tablet TAKE one TABLETS BY MOUTH AT BEDTIME FOR SLEEP 90 tablet 1   • Wixela Inhub 250-50 MCG/DOSE DISKUS Inhale 2 puffs As Needed.       No current facility-administered medications for this visit.       COLLATERAL PSYCHOTHERAPEUTIC INTERVENTION:  patient not interested in additional psychotherapy.    RISK: Moderate to significant noting severity of his prior episode of depression.    Assessment & Plan     Diagnoses and all orders for this visit:    1. Recurrent major depressive disorder, in remission (HCC)  -     ALPRAZolam XR (XANAX XR) 2 MG 24 hr tablet; Take one at 8 PM daily  Indications: anxiety disorder  Dispense: 90 tablet; Refill: 1  -     traZODone (DESYREL) 100 MG tablet; TAKE one TABLETS BY MOUTH AT BEDTIME FOR SLEEP  Dispense: 90 tablet; Refill: 1  -     DULoxetine (Cymbalta) 60 MG capsule; Take one bid.  Dispense: 180 capsule; Refill: 1  -     doxepin (SINEquan) 50 MG capsule; TAKE 2 CAPSULES BY MOUTH ONCE A DAY AT BEDTIME  Dispense: 90 capsule; Refill: 0    2. Medication management  -     ALPRAZolam XR (XANAX XR) 2 MG 24 hr tablet; Take one at 8 PM daily  Indications: anxiety disorder  Dispense: 90 tablet; Refill: 1        No follow-ups on file.           Patient knows to call if symptoms worsen or fail to improve between appointments.    I spent  30 minutes caring for Cecilio on this date of service. This time includes time spent by me in the following activities: Patient evaluation, support psychotherapy, decisions, medications, and documentation.     Dictated utilizing Dragon dictation    RICARDO Almonte MD

## 2022-08-25 DIAGNOSIS — F17.200 SMOKING: ICD-10-CM

## 2022-08-25 RX ORDER — NICOTINE 10 MG
1 CARTRIDGE (EA) INHALATION AS NEEDED
Qty: 168 EACH | Refills: 6 | Status: SHIPPED | OUTPATIENT
Start: 2022-08-25

## 2022-10-29 DIAGNOSIS — F33.40 RECURRENT MAJOR DEPRESSIVE DISORDER, IN REMISSION: ICD-10-CM

## 2022-10-31 RX ORDER — DOXEPIN HYDROCHLORIDE 50 MG/1
CAPSULE ORAL
Qty: 90 CAPSULE | Refills: 0 | Status: SHIPPED | OUTPATIENT
Start: 2022-10-31 | End: 2022-12-14

## 2022-10-31 NOTE — PROGRESS NOTES
"Encounter Date:04/18/2018      Patient ID: Cecilio Fierro is a 63 y.o. male.        Subjective:     Chief Complaint: Establish Care incision pain s/p gen change 2/2018     History of Present Illness  Mr. Fierro is a 62 YO M with symptomatic bradycardia s/p [facility] PPM with recent generator change on 2/21/18, HTN, hyperlipidemia, and anxiety/depression who [facility] due to weakness/fatigue and incisional pain s/p generator change. He reports raspy voice and tenderness at pacemaker site since gen change. He also reports tenderness above the pacemaker site with some \"clear sticky drainage\". No fevers. No palpitations, dizziness, presyncope or syncope. He has chronic fatigue and dyspnea which he thinks is worse recently but started prior to generator change. He has gained over 60lbs and says he has upcoming back surgery in May. He also reports worsening edema.     Patient Active Problem List   Diagnosis   • CAD (coronary artery disease)   • Symptomatic bradycardia   • Hypertension   • Dyslipidemia   • Tobacco use   • Diverticulosis   • Anxiety and depression   • Neuropathy   • Pacemaker   • Back pain         Past Surgical History:   Procedure Laterality Date   • BACK SURGERY     • CARDIAC CATHETERIZATION     • CARDIAC ELECTROPHYSIOLOGY PROCEDURE N/A 2/21/2018    Procedure: PPM battery change- Impulse, schedule in 3 months;  Surgeon: Montez Latham DO;  Location: [facility] INVASIVE LOCATION;  Service:    • CHOLECYSTECTOMY     • COLONOSCOPY     • CORONARY ANGIOPLASTY WITH STENT PLACEMENT      x5   • INSERT / REPLACE / REMOVE PACEMAKER  2003    pacemaker originally inserted in 2003 by Dr Chawla    • JOINT REPLACEMENT Right 2014   • LIPOMA RESECTION      Lipoma resection from abdominal wall.    • OTHER SURGICAL HISTORY      Bullet removal.   • PACEMAKER IMPLANTATION  10/2003    Generator change 02/14/2007, [facility]    • REPLACEMENT TOTAL KNEE Right        Allergies "   Allergen Reactions   • Zofran [Ondansetron Hcl] Other (See Comments)     Caused nausea and itching.          Current Outpatient Prescriptions:   •  aMILoride (MIDAMOR) 5 MG tablet, Take 5 mg by mouth Daily. Prescribed BID but takes daily only, Disp: , Rfl:   •  aspirin 325 MG tablet, Take 325 mg by mouth Daily., Disp: , Rfl:   •  benzonatate (TESSALON) 200 MG capsule, Take 200 mg by mouth 3 (Three) Times a Day As Needed for Cough., Disp: , Rfl:   •  bumetanide (BUMEX) 1 MG tablet, Take 1 mg by mouth 2 (Two) Times a Day., Disp: , Rfl:   •  cetirizine (zyrTEC) 10 MG tablet, Take 10 mg by mouth Daily., Disp: , Rfl:   •  dicyclomine (BENTYL) 10 MG capsule, Take 10 mg by mouth 3 (Three) Times a Day As Needed., Disp: , Rfl:   •  DilTIAZem HCl Coated Beads (DILTIAZEM CD PO), Take 360 mg by mouth Daily., Disp: , Rfl:   •  doxepin (SINEquan) 50 MG capsule, Take 1 capsule by mouth Every Night., Disp: 30 capsule, Rfl: 3  •  DULoxetine (CYMBALTA) 60 MG capsule, Take one bid., Disp: 60 capsule, Rfl: 3  •  eplerenone (INSPRA) 50 MG tablet, Take 50 mg by mouth Daily., Disp: , Rfl:   •  folic acid (FOLVITE) 1 MG tablet, Take 1 mg by mouth Daily., Disp: , Rfl:   •  hydrALAZINE (APRESOLINE) 25 MG tablet, Take 25 mg by mouth 3 (Three) Times a Day., Disp: , Rfl:   •  lisinopril (PRINIVIL,ZESTRIL) 10 MG tablet, Take 10 mg by mouth Daily., Disp: , Rfl:   •  LORazepam (ATIVAN) 2 MG tablet, One half or 1 twice a day when necessary anxiety, Disp: 60 tablet, Rfl: 3  •  methocarbamol (ROBAXIN) 750 MG tablet, Take 750 mg by mouth 4 (Four) Times a Day As Needed., Disp: , Rfl:   •  metoprolol tartrate (LOPRESSOR) 100 MG tablet, Take 100 mg by mouth 2 (Two) Times a Day., Disp: , Rfl:   •  montelukast (SINGULAIR) 10 MG tablet, Take 10 mg by mouth Every Night., Disp: , Rfl:   •  nitroglycerin (NITROSTAT) 0.4 MG SL tablet, Place 0.4 mg under the tongue Every 5 (Five) Minutes As Needed for Chest Pain., Disp: , Rfl:   •  Omega-3 Fatty Acids (FISH  OIL) 1200 MG capsule capsule, Take 1,200 mg by mouth 2 (Two) Times a Day., Disp: , Rfl:   •  omeprazole (priLOSEC) 40 MG capsule, Take 40 mg by mouth Daily., Disp: , Rfl:   •  potassium chloride (K-DUR,KLOR-CON) 20 MEQ CR tablet, Take 40 mEq by mouth 3 (Three) Times a Day., Disp: , Rfl:   •  pravastatin (PRAVACHOL) 40 MG tablet, Take 40 mg by mouth Daily., Disp: , Rfl:   •  promethazine (PHENERGAN) 25 MG tablet, Take 25 mg by mouth Every 6 (Six) Hours As Needed for Nausea or Vomiting., Disp: , Rfl:   •  raNITIdine (ZANTAC) 300 MG tablet, Take 300 mg by mouth 2 (Two) Times a Day., Disp: , Rfl:   •  rOPINIRole (REQUIP) 1 MG tablet, Take 1 mg by mouth Every Night. Take 1 hour before bedtime., Disp: , Rfl:   •  traZODone (DESYREL) 100 MG tablet, Take two at hs, Disp: 60 tablet, Rfl: 3  •  doxycycline (VIBRAMYICN) 100 MG tablet, Take 1 tablet by mouth 2 (Two) Times a Day., Disp: 20 tablet, Rfl: 0    The following portions of the chart were reviewed and updated as appropriate: Allergies, current medications, past family history, social history, past medical history.     Review of Systems   Constitution: Positive for weakness, malaise/fatigue and weight gain. Negative for chills, decreased appetite, diaphoresis, fever, night sweats and weight loss.   HENT: Positive for congestion and hearing loss. Negative for hoarse voice and nosebleeds.    Eyes: Negative for blurred vision, visual disturbance and visual halos.   Cardiovascular: Positive for chest pain, dyspnea on exertion, leg swelling and paroxysmal nocturnal dyspnea. Negative for claudication, cyanosis, irregular heartbeat, near-syncope, orthopnea, palpitations and syncope.   Respiratory: Positive for shortness of breath and snoring. Negative for cough, hemoptysis, sleep disturbances due to breathing, sputum production and wheezing.    Endocrine: Positive for polydipsia.   Hematologic/Lymphatic: Negative for bleeding problem. Bruises/bleeds easily.   Skin: Negative for  "dry skin, itching and rash.   Musculoskeletal: Positive for joint pain, muscle cramps and muscle weakness. Negative for arthritis, joint swelling and myalgias.   Gastrointestinal: Positive for heartburn. Negative for bloating, abdominal pain, constipation, diarrhea, flatus, hematemesis, hematochezia, melena, nausea and vomiting.   Genitourinary: Negative for dysuria, frequency, hematuria, nocturia and urgency.   Neurological: Positive for excessive daytime sleepiness and headaches. Negative for dizziness, light-headedness and loss of balance.   Psychiatric/Behavioral: Positive for depression. The patient has insomnia. The patient is not nervous/anxious.    Allergic/Immunologic:        Seasonal allergies             Objective:     Vitals:    04/18/18 0921 04/18/18 0924 04/18/18 0925   BP: 122/76 123/79 121/78   BP Location: Right arm Left arm Left arm   Patient Position: Sitting Sitting Standing   Pulse: 70  76   Resp: 16     Temp: 98.8 °F (37.1 °C)     TempSrc: Temporal Artery      SpO2: 96%     Weight: 130 kg (285 lb 12.8 oz)     Height: 180.3 cm (71\")           Physical Exam   Constitutional: He is oriented to person, place, and time. He appears well-developed and well-nourished. No distress.   obese   HENT:   Head: Normocephalic.   Eyes: Conjunctivae are normal. Pupils are equal, round, and reactive to light.   Neck: Neck supple. No JVD present. No thyromegaly present.   Cardiovascular: Normal rate, regular rhythm, normal heart sounds and intact distal pulses.  Exam reveals no gallop and no friction rub.    No murmur heard.  Pulmonary/Chest: Effort normal and breath sounds normal. No respiratory distress. He has no wheezes. He has no rales. He exhibits no tenderness.   Abdominal: Soft. Bowel sounds are normal. He exhibits distension.   Musculoskeletal: Normal range of motion. He exhibits no edema.   Neurological: He is alert and oriented to person, place, and time.   Skin: Skin is warm and dry.   Incision site " appears clean dry and intact. There is a small area of firmness on the lateral aspect of the incision which is tender but no redness or sign of infection   Psychiatric: He has a normal mood and affect. His behavior is normal. Thought content normal.   Vitals reviewed.      Lab and Diagnostic Review:      EKG today shows a-paced rhythm    Assessment and Plan:         1. Symptomatic bradycardia  - s/p PPM with recent generator change  - ECG 12 Lead; Future  - CBC & Differential; Future  - Comprehensive Metabolic Panel; Future  - Adult Transthoracic Echo Complete W/ Cont if Necessary Per Protocol; Future    2. Pacemaker  - No apparent sign of infection but a small firm area that could be an abscess. Start doxycycline today  - CBC & Differential; Future  - Comprehensive Metabolic Panel; Future  - Adult Transthoracic Echo Complete W/ Cont if Necessary Per Protocol; Future  - XR Chest PA & Lateral; Future    3. Shortness of breath  - Does not appear to be in active CHF. No recent echo. Will re-evaluate LV function. Questionable LV dysfunction in the past per records  - CBC & Differential; Future  - Comprehensive Metabolic Panel; Future  - BNP; Future  - Adult Transthoracic Echo Complete W/ Cont if Necessary Per Protocol; Future  - XR Chest PA & Lateral; Future    4. Essential hypertension  - Controlled today  - HTN Education provided today including signs and symptoms, medication management, daily blood pressure monitoring.  Patient encouraged to call the [facility] with any blood pressure consistently greater than 130/80  - Adult Transthoracic Echo Complete W/ Cont if Necessary Per Protocol; Future    RV in one week for wound check     It has been a pleasure to participate in the care of this patient.  Patient was instructed to call the [facility] with any questions, concerns, or worsening symptoms.      * Please note that portions of this note were completed with a voice recognition program.  Efforts were made to edit the dictation but occasionally words are transcribed.    Undermining Type: Entire Wound

## 2022-11-11 PROCEDURE — 93294 REM INTERROG EVL PM/LDLS PM: CPT | Performed by: STUDENT IN AN ORGANIZED HEALTH CARE EDUCATION/TRAINING PROGRAM

## 2022-11-11 PROCEDURE — 93296 REM INTERROG EVL PM/IDS: CPT | Performed by: STUDENT IN AN ORGANIZED HEALTH CARE EDUCATION/TRAINING PROGRAM

## 2022-12-13 DIAGNOSIS — F33.40 RECURRENT MAJOR DEPRESSIVE DISORDER, IN REMISSION: ICD-10-CM

## 2022-12-14 RX ORDER — DOXEPIN HYDROCHLORIDE 50 MG/1
CAPSULE ORAL
Qty: 60 CAPSULE | Refills: 1 | Status: SHIPPED | OUTPATIENT
Start: 2022-12-14 | End: 2023-01-30 | Stop reason: SDUPTHER

## 2023-01-30 ENCOUNTER — OFFICE VISIT (OUTPATIENT)
Dept: PSYCHIATRY | Facility: CLINIC | Age: 68
End: 2023-01-30
Payer: MEDICARE

## 2023-01-30 VITALS
WEIGHT: 270 LBS | SYSTOLIC BLOOD PRESSURE: 120 MMHG | BODY MASS INDEX: 38.74 KG/M2 | HEART RATE: 81 BPM | DIASTOLIC BLOOD PRESSURE: 83 MMHG

## 2023-01-30 DIAGNOSIS — F33.40 RECURRENT MAJOR DEPRESSIVE DISORDER, IN REMISSION: Primary | ICD-10-CM

## 2023-01-30 DIAGNOSIS — Z79.899 MEDICATION MANAGEMENT: ICD-10-CM

## 2023-01-30 PROCEDURE — 99214 OFFICE O/P EST MOD 30 MIN: CPT | Performed by: PSYCHIATRY & NEUROLOGY

## 2023-01-30 RX ORDER — ALPRAZOLAM 2 MG/1
TABLET, EXTENDED RELEASE ORAL
Qty: 90 TABLET | Refills: 1 | Status: SHIPPED | OUTPATIENT
Start: 2023-01-30

## 2023-01-30 RX ORDER — DULOXETIN HYDROCHLORIDE 60 MG/1
CAPSULE, DELAYED RELEASE ORAL
Qty: 180 CAPSULE | Refills: 1 | Status: SHIPPED | OUTPATIENT
Start: 2023-01-30

## 2023-01-30 RX ORDER — TRAZODONE HYDROCHLORIDE 100 MG/1
TABLET ORAL
Qty: 90 TABLET | Refills: 1 | Status: SHIPPED | OUTPATIENT
Start: 2023-01-30

## 2023-01-30 RX ORDER — DOXEPIN HYDROCHLORIDE 50 MG/1
CAPSULE ORAL
Qty: 60 CAPSULE | Refills: 5 | Status: SHIPPED | OUTPATIENT
Start: 2023-01-30

## 2023-01-30 NOTE — PROGRESS NOTES
Patient ID: Cecilio Fierro is a 67 y.o. male    SERVICE TYPE: EVALUATION AND MANAGEMENT (greater than 50% of the time spent for supportive psychotherapy).      /83   Pulse 81   Wt 122 kg (270 lb)   BMI 38.74 kg/m²     ALLERGIES:  Morphine, Ondansetron, and Zofran [ondansetron hcl]    CC/ Focus of the visit: Depression    HPI: Patient having difficulties with balance today attributed to otitis media bilaterally, saw his primary care physician earlier this day receiving prescriptions.  Patient reports that he has not had a clinically significant occurrence of an episode of depression since his last clinic visit, no alcohol, no indication of prescription misuse or substance abuse.  Continues to exercise on a regular basis at a local gym, prides himself on his cardiovascular and weights work outs.  Basic activity interests tolerance for stress all at irrelative norms.  Was a considerable help for his wife recovery from surgery. Sleeping adequately with current medications.     PFSH: Fierro seen with his wife today who confirmed confirms patient's report.  Both very supportive of the other.      Review of Systems  No cardiopulmonary, GI or neurological complaints.    SUPPORTIVE PSYCHOTHERAPY: continuing efforts to promote the therapeutic alliance, address the patient’s issues, and strengthen self awareness, insights, and positive coping skills such as Exercising, listen to music, spending time in nature and utilizing resources.     Mental Status Exam  Appearance:  appropriate  Attitude toward clinician:  cooperative and agreeable   Speech:    Rate:  regular rate and rhythm   Volume: normal  Motor:  no abnormal movements   Mood:  Good  Affect:  euthymic  Thought Processes:  linear, logical, and goal directed  Thought Content:  Normal   Feeling Hopeless: absent  Suicidal Thoughts or Intent:  absent  Homicidal Thoughts:  absent  Perceptual Disturbance: no perceptual disturbance  Attention and Concentration:   good  Insight and Judgement:  good  Memory:  memory appears to be intact    LABS: No results found for this or any previous visit (from the past 168 hour(s)).    MEDICATION ISSUES: Have discussed with the patient the medications Risks, Benefits, and Side effects including potential falls, possible impaired driving and  metabolic adversities among others. No medication side effects or related complaints today.     TREATMENT PLAN/GOALS: Continue supportive psychotherapy efforts and medications as indicated.     Current Outpatient Medications   Medication Sig Dispense Refill   • alfuzosin (UROXATRAL) 10 MG 24 hr tablet TAKE 1 TABLET BY MOUTH ONCE A DAY AFTER THE LAST MEAL OF THE DAY FOR PROSTATE     • allopurinol (ZYLOPRIM) 300 MG tablet TAKE 1 TABLET BY MOUTH ONCE A DAY FOR GOUT     • ALPRAZolam XR (XANAX XR) 2 MG 24 hr tablet Take one at 8 PM daily  Indications: anxiety disorder 90 tablet 1   • aspirin 325 MG tablet Take 325 mg by mouth Daily.     • bumetanide (BUMEX) 1 MG tablet Take 1 mg by mouth 2 (Two) Times a Day.     • cetirizine (zyrTEC) 10 MG tablet Take 10 mg by mouth Daily.     • doxepin (SINEquan) 50 MG capsule TAKE 2 CAPSULES BY MOUTH EVERY NIGHT AT BEDTIME FOR ANXIETY AND/OR REST 60 capsule 5   • DULoxetine (Cymbalta) 60 MG capsule Take one bid. 180 capsule 1   • fexofenadine (ALLEGRA) 180 MG tablet      • fluticasone (FLONASE) 50 MCG/ACT nasal spray SPRAY 2 SPRAYS INTO EACH NOSTRIL ONCE A DAY AS DIRECTED BY YOUR PRESCRIBER     • folic acid (FOLVITE) 1 MG tablet Take 1 mg by mouth Daily.     • gabapentin (NEURONTIN) 800 MG tablet TAKE 1 TABLET BY MOUTH THREE TIMES A DAY FOR NERVE PAIN (MAY CAUSE DROWSINESS)     • methocarbamol (ROBAXIN) 750 MG tablet Take 750 mg by mouth 2 (Two) Times a Day.     • metoprolol tartrate (LOPRESSOR) 25 MG tablet Take 1 tablet by mouth 2 (Two) Times a Day. 180 tablet 3   • montelukast (SINGULAIR) 10 MG tablet Take 10 mg by mouth Every Night.     • Nicotrol 10 MG inhaler  INHALE 1 PUFF AS NEEDED FOR SMOKING CESSATION 168 each 6   • nitroglycerin (NITROSTAT) 0.4 MG SL tablet Place 0.4 mg under the tongue Every 5 (Five) Minutes As Needed for Chest Pain.     • omeprazole (priLOSEC) 40 MG capsule Take 40 mg by mouth Daily.     • pravastatin (PRAVACHOL) 40 MG tablet Take 40 mg by mouth Daily.     • promethazine (PHENERGAN) 25 MG tablet Take 25 mg by mouth As Needed for Nausea or Vomiting.     • rOPINIRole (REQUIP) 3 MG tablet Take 3 mg by mouth 2 (Two) Times a Day. Take 1 hour before bedtime.      • traZODone (DESYREL) 100 MG tablet TAKE one TABLETS BY MOUTH AT BEDTIME FOR SLEEP 90 tablet 1   • Wixela Inhub 250-50 MCG/DOSE DISKUS Inhale 2 puffs As Needed.       No current facility-administered medications for this visit.       COLLATERAL PSYCHOTHERAPEUTIC INTERVENTION:  patient not interested in additional psychotherapy.    RISK:  Moderate.     Assessment & Plan     Diagnoses and all orders for this visit:    1. Recurrent major depressive disorder, in remission (HCC) (Primary)  -     ALPRAZolam XR (XANAX XR) 2 MG 24 hr tablet; Take one at 8 PM daily  Indications: anxiety disorder  Dispense: 90 tablet; Refill: 1  -     traZODone (DESYREL) 100 MG tablet; TAKE one TABLETS BY MOUTH AT BEDTIME FOR SLEEP  Dispense: 90 tablet; Refill: 1  -     DULoxetine (Cymbalta) 60 MG capsule; Take one bid.  Dispense: 180 capsule; Refill: 1  -     doxepin (SINEquan) 50 MG capsule; TAKE 2 CAPSULES BY MOUTH EVERY NIGHT AT BEDTIME   Dispense: 60 capsule; Refill: 5    2. Medication management  -     ALPRAZolam XR (XANAX XR) 2 MG 24 hr tablet; Take one at 8 PM daily  Indications: anxiety disorder  Dispense: 90 tablet; Refill: 1        Return in about 6 months (around 7/30/2023).           Patient knows to call if symptoms worsen or fail to improve between appointments.    I spent 30 minutes caring for Cecilio on this date of service. This time includes time spent by me in the following activities: Patient evaluation,  support psychotherapy, decisions, medications, and documentation.     Dictated utilizing [employer] dictation    RICARDO Almonte MD

## 2023-02-10 PROCEDURE — 93296 REM INTERROG EVL PM/IDS: CPT | Performed by: STUDENT IN AN ORGANIZED HEALTH CARE EDUCATION/TRAINING PROGRAM

## 2023-02-10 PROCEDURE — 93294 REM INTERROG EVL PM/LDLS PM: CPT | Performed by: STUDENT IN AN ORGANIZED HEALTH CARE EDUCATION/TRAINING PROGRAM

## 2023-05-12 PROCEDURE — 93294 REM INTERROG EVL PM/LDLS PM: CPT | Performed by: STUDENT IN AN ORGANIZED HEALTH CARE EDUCATION/TRAINING PROGRAM

## 2023-05-12 PROCEDURE — 93296 REM INTERROG EVL PM/IDS: CPT | Performed by: STUDENT IN AN ORGANIZED HEALTH CARE EDUCATION/TRAINING PROGRAM

## 2023-05-31 ENCOUNTER — HOSPITAL ENCOUNTER (OUTPATIENT)
Dept: GENERAL RADIOLOGY | Facility: HOSPITAL | Age: 68
Discharge: HOME | End: 2023-05-31
Admitting: FAMILY MEDICINE

## 2023-05-31 ENCOUNTER — TRANSCRIBE ORDERS (OUTPATIENT)
Dept: LAB | Facility: HOSPITAL | Age: 68
End: 2023-05-31

## 2023-05-31 DIAGNOSIS — R06.02 SHORTNESS OF BREATH: ICD-10-CM

## 2023-05-31 DIAGNOSIS — R06.2 WHEEZING: ICD-10-CM

## 2023-05-31 DIAGNOSIS — R05.1 ACUTE COUGH: ICD-10-CM

## 2023-05-31 DIAGNOSIS — J20.9 ACUTE BRONCHITIS, UNSPECIFIED ORGANISM: ICD-10-CM

## 2023-05-31 DIAGNOSIS — J20.9 ACUTE BRONCHITIS, UNSPECIFIED ORGANISM: Primary | ICD-10-CM

## 2023-05-31 PROCEDURE — 71046 X-RAY EXAM CHEST 2 VIEWS: CPT

## 2023-05-31 PROCEDURE — 71046 X-RAY EXAM CHEST 2 VIEWS: CPT | Performed by: RADIOLOGY

## 2023-06-13 ENCOUNTER — TRANSCRIBE ORDERS (OUTPATIENT)
Dept: ULTRASOUND IMAGING | Facility: HOSPITAL | Age: 68
End: 2023-06-13
Payer: MEDICARE

## 2023-06-13 DIAGNOSIS — N18.2 CHRONIC KIDNEY DISEASE, STAGE II (MILD): Primary | ICD-10-CM

## 2023-06-16 ENCOUNTER — HOSPITAL ENCOUNTER (OUTPATIENT)
Dept: ULTRASOUND IMAGING | Facility: HOSPITAL | Age: 68
Discharge: HOME | End: 2023-06-16
Payer: MEDICARE

## 2023-06-16 ENCOUNTER — TRANSCRIBE ORDERS (OUTPATIENT)
Dept: ADMINISTRATIVE | Facility: HOSPITAL | Age: 68
End: 2023-06-16
Payer: MEDICARE

## 2023-06-16 ENCOUNTER — LAB (OUTPATIENT)
Dept: LAB | Facility: HOSPITAL | Age: 68
End: 2023-06-16
Payer: MEDICARE

## 2023-06-16 DIAGNOSIS — N18.2 CHRONIC KIDNEY DISEASE, STAGE II (MILD): ICD-10-CM

## 2023-06-16 DIAGNOSIS — N18.2 CHRONIC KIDNEY DISEASE, STAGE II (MILD): Primary | ICD-10-CM

## 2023-06-16 PROCEDURE — 82570 ASSAY OF URINE CREATININE: CPT

## 2023-06-16 PROCEDURE — 82043 UR ALBUMIN QUANTITATIVE: CPT

## 2023-06-16 PROCEDURE — 36415 COLL VENOUS BLD VENIPUNCTURE: CPT

## 2023-06-16 PROCEDURE — 84156 ASSAY OF PROTEIN URINE: CPT

## 2023-06-16 PROCEDURE — 81003 URINALYSIS AUTO W/O SCOPE: CPT

## 2023-06-16 PROCEDURE — 76775 US EXAM ABDO BACK WALL LIM: CPT

## 2023-06-16 PROCEDURE — 76775 US EXAM ABDO BACK WALL LIM: CPT | Performed by: RADIOLOGY

## 2023-06-16 PROCEDURE — 80053 COMPREHEN METABOLIC PANEL: CPT

## 2023-06-17 LAB
ALBUMIN SERPL-MCNC: 3.9 G/DL (ref 3.5–5.2)
ALBUMIN UR-MCNC: <1.2 MG/DL
ALBUMIN/GLOB SERPL: 1.4 G/DL
ALP SERPL-CCNC: 130 U/L (ref 39–117)
ALT SERPL W P-5'-P-CCNC: 18 U/L (ref 1–41)
ANION GAP SERPL CALCULATED.3IONS-SCNC: 10.5 MMOL/L (ref 5–15)
AST SERPL-CCNC: 23 U/L (ref 1–40)
BILIRUB SERPL-MCNC: <0.2 MG/DL (ref 0–1.2)
BILIRUB UR QL STRIP: NEGATIVE
BUN SERPL-MCNC: 27 MG/DL (ref 8–23)
BUN/CREAT SERPL: 18.1 (ref 7–25)
CALCIUM SPEC-SCNC: 9.9 MG/DL (ref 8.6–10.5)
CHLORIDE SERPL-SCNC: 102 MMOL/L (ref 98–107)
CLARITY UR: CLEAR
CO2 SERPL-SCNC: 28.5 MMOL/L (ref 22–29)
COLOR UR: ABNORMAL
CREAT SERPL-MCNC: 1.49 MG/DL (ref 0.76–1.27)
CREAT UR-MCNC: 256.1 MG/DL
CREAT UR-MCNC: 256.1 MG/DL
EGFRCR SERPLBLD CKD-EPI 2021: 51.4 ML/MIN/1.73
GLOBULIN UR ELPH-MCNC: 2.7 GM/DL
GLUCOSE SERPL-MCNC: 110 MG/DL (ref 65–99)
GLUCOSE UR STRIP-MCNC: NEGATIVE MG/DL
HGB UR QL STRIP.AUTO: NEGATIVE
KETONES UR QL STRIP: ABNORMAL
LEUKOCYTE ESTERASE UR QL STRIP.AUTO: NEGATIVE
MICROALBUMIN/CREAT UR: NORMAL MG/G{CREAT}
NITRITE UR QL STRIP: NEGATIVE
PH UR STRIP.AUTO: 5.5 [PH] (ref 5–8)
POTASSIUM SERPL-SCNC: 3.5 MMOL/L (ref 3.5–5.2)
PROT ?TM UR-MCNC: 21 MG/DL
PROT SERPL-MCNC: 6.6 G/DL (ref 6–8.5)
PROT UR QL STRIP: ABNORMAL
PROT/CREAT UR: 82 MG/G CREA (ref 0–200)
SODIUM SERPL-SCNC: 141 MMOL/L (ref 136–145)
SP GR UR STRIP: >=1.03 (ref 1–1.03)
UROBILINOGEN UR QL STRIP: ABNORMAL

## 2023-08-01 ENCOUNTER — OFFICE VISIT (OUTPATIENT)
Dept: PSYCHIATRY | Facility: CLINIC | Age: 68
End: 2023-08-01
Payer: MEDICARE

## 2023-08-01 VITALS
WEIGHT: 268.6 LBS | SYSTOLIC BLOOD PRESSURE: 117 MMHG | HEIGHT: 70 IN | DIASTOLIC BLOOD PRESSURE: 72 MMHG | BODY MASS INDEX: 38.45 KG/M2 | HEART RATE: 68 BPM

## 2023-08-01 DIAGNOSIS — Z79.899 MEDICATION MANAGEMENT: ICD-10-CM

## 2023-08-01 DIAGNOSIS — F33.40 RECURRENT MAJOR DEPRESSIVE DISORDER, IN REMISSION: ICD-10-CM

## 2023-08-01 RX ORDER — DOXEPIN HYDROCHLORIDE 50 MG/1
CAPSULE ORAL
Qty: 90 CAPSULE | Refills: 1 | Status: SHIPPED | OUTPATIENT
Start: 2023-08-01

## 2023-08-01 RX ORDER — DULOXETIN HYDROCHLORIDE 60 MG/1
CAPSULE, DELAYED RELEASE ORAL
Qty: 180 CAPSULE | Refills: 1 | Status: SHIPPED | OUTPATIENT
Start: 2023-08-01

## 2023-08-01 RX ORDER — FAMOTIDINE 20 MG/1
20 TABLET, FILM COATED ORAL 2 TIMES DAILY
COMMUNITY

## 2023-08-01 RX ORDER — TRAZODONE HYDROCHLORIDE 100 MG/1
TABLET ORAL
Qty: 90 TABLET | Refills: 1 | Status: SHIPPED | OUTPATIENT
Start: 2023-08-01

## 2023-08-01 RX ORDER — ALPRAZOLAM 2 MG/1
TABLET, EXTENDED RELEASE ORAL
Qty: 90 TABLET | Refills: 1 | Status: SHIPPED | OUTPATIENT
Start: 2023-08-01

## 2023-08-11 PROCEDURE — 93294 REM INTERROG EVL PM/LDLS PM: CPT | Performed by: STUDENT IN AN ORGANIZED HEALTH CARE EDUCATION/TRAINING PROGRAM

## 2023-08-11 PROCEDURE — 93296 REM INTERROG EVL PM/IDS: CPT | Performed by: STUDENT IN AN ORGANIZED HEALTH CARE EDUCATION/TRAINING PROGRAM

## 2023-10-12 ENCOUNTER — OFFICE VISIT (OUTPATIENT)
Dept: UROLOGY | Facility: CLINIC | Age: 68
End: 2023-10-12
Payer: MEDICARE

## 2023-10-12 VITALS — HEART RATE: 69 BPM | DIASTOLIC BLOOD PRESSURE: 80 MMHG | OXYGEN SATURATION: 95 % | SYSTOLIC BLOOD PRESSURE: 124 MMHG

## 2023-10-12 DIAGNOSIS — D49.4 BLADDER TUMOR: ICD-10-CM

## 2023-10-12 DIAGNOSIS — R39.9 LOWER URINARY TRACT SYMPTOMS: ICD-10-CM

## 2023-10-12 DIAGNOSIS — C61 PROSTATE CANCER: Primary | ICD-10-CM

## 2023-10-12 PROCEDURE — 87086 URINE CULTURE/COLONY COUNT: CPT | Performed by: STUDENT IN AN ORGANIZED HEALTH CARE EDUCATION/TRAINING PROGRAM

## 2023-10-12 RX ORDER — NITROFURANTOIN 25; 75 MG/1; MG/1
100 CAPSULE ORAL ONCE
Qty: 1 CAPSULE | Refills: 0 | COMMUNITY
Start: 2023-10-12 | End: 2023-10-12
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
## 2023-10-14 LAB — BACTERIA SPEC AEROBE CULT: NO GROWTH

## 2023-10-23 ENCOUNTER — TELEPHONE (OUTPATIENT)
Dept: UROLOGY | Facility: CLINIC | Age: 68
End: 2023-10-23
Payer: MEDICARE

## 2023-10-23 NOTE — TELEPHONE ENCOUNTER
Pt's wife called asking about the results from the urine cytology and urine culture. I informed her that we didn't have a results message yet and I would send a message over to Dr. Barber to see if we could get some information and call her back. PT also had a few questions regarding the [employer] Medicare [employer]vantage negotiations with [facility] and I tried to answer as many as possible and pointed her to the [facility] Website for more information.

## 2023-10-23 NOTE — TELEPHONE ENCOUNTER
I called patient with results of urine cytology, this came back atypical.  This does not mean cancer.  This could be related to the cystoscopy or recent instrumentation of the bladder.  There was no obvious findings of cancer inside the bladder.  I would recommend a repeat cystoscopy in my clinic in December to take another look to make sure we are not missing anything.  He is seeing Dr. Lockhart and radiation oncology for prostate cancer evaluation next week.  Patient reports understanding.    PLAN  - set patient up for repeat cystoscopy and urine cytology appt in my procedure clinic 12/12/23 thanks  - please call him to arrange    Malcolm Barber MD

## 2023-11-02 ENCOUNTER — OFFICE VISIT (OUTPATIENT)
Dept: RADIATION ONCOLOGY | Facility: HOSPITAL | Age: 68
End: 2023-11-02
Payer: MEDICARE

## 2023-11-02 ENCOUNTER — HOSPITAL ENCOUNTER (OUTPATIENT)
Dept: RADIATION ONCOLOGY | Facility: HOSPITAL | Age: 68
Setting detail: RADIATION/ONCOLOGY SERIES
Discharge: HOME | End: 2023-11-02
Payer: MEDICARE

## 2023-11-02 VITALS
RESPIRATION RATE: 16 BRPM | OXYGEN SATURATION: 95 % | WEIGHT: 259.3 LBS | TEMPERATURE: 97 F | HEIGHT: 70 IN | SYSTOLIC BLOOD PRESSURE: 140 MMHG | DIASTOLIC BLOOD PRESSURE: 89 MMHG | HEART RATE: 73 BPM | BODY MASS INDEX: 37.12 KG/M2

## 2023-11-02 DIAGNOSIS — C61 PROSTATE CANCER: Primary | ICD-10-CM

## 2023-11-02 PROCEDURE — G0463 HOSPITAL OUTPT CLINIC VISIT: HCPCS
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
## 2023-11-03 DIAGNOSIS — C61 PROSTATE CANCER: Primary | ICD-10-CM

## 2023-11-07 ENCOUNTER — DOCUMENTATION (OUTPATIENT)
Dept: ONCOLOGY | Facility: CLINIC | Age: 68
End: 2023-11-07
Payer: MEDICARE

## 2023-11-07 NOTE — PROGRESS NOTES
Distress Screening Follow-up    Name: Cecilio Fierro    : 1955    Diagnosis: Prostate Cancer    Location of Distress Screening: Radiation Oncology    Distress Level: 9 (2023 11:00 AM)     Interventions:   Transportation Needs: gas cards  Practical Needs: Lodging Assistance ([facility] tx[facility]      Comments:  OCTAVIANO contacted pt to follow up on Distress Screen from recent visit. OCTAVIANO provided introductions and explained nature of the call. OCTAVIANO spoke with pt and his wife, and educated them on role and services. Pt's wife asked about transportation and lodging assistance as they are over 100 miles away. OCTAVIANO educated them on [facility] and will send referral once treatment dates have been established. OCTAVIANO also educated them on gas card availability, and will provide transportation assistance when tx has been established as well. Pt and his wife thanked OCTAVIANO for the information. OCTAVIANO provided contact information and will be available ongoing.

## 2023-11-21 ENCOUNTER — HOSPITAL ENCOUNTER (OUTPATIENT)
Dept: CT IMAGING | Facility: HOSPITAL | Age: 68
Discharge: HOME | End: 2023-11-21
Admitting: RADIOLOGY
Payer: MEDICARE

## 2023-11-21 DIAGNOSIS — C61 PROSTATE CANCER: ICD-10-CM

## 2023-11-21 LAB — CREAT BLDA-MCNC: 1.5 MG/DL (ref 0.6–1.3)

## 2023-11-21 PROCEDURE — 25510000001 IOPAMIDOL 61 % SOLUTION: Performed by: RADIOLOGY

## 2023-11-21 PROCEDURE — 74177 CT ABD & PELVIS W/CONTRAST: CPT

## 2023-11-21 PROCEDURE — 82565 ASSAY OF CREATININE: CPT

## 2023-11-21 RX ADMIN — IOPAMIDOL 87 ML: 612 INJECTION, SOLUTION INTRAVENOUS at 09:25

## 2023-11-30 ENCOUNTER — OFFICE VISIT (OUTPATIENT)
Dept: RADIATION ONCOLOGY | Facility: HOSPITAL | Age: 68
End: 2023-11-30
Payer: MEDICARE

## 2023-11-30 VITALS
WEIGHT: 268.2 LBS | SYSTOLIC BLOOD PRESSURE: 139 MMHG | RESPIRATION RATE: 16 BRPM | HEART RATE: 72 BPM | OXYGEN SATURATION: 93 % | TEMPERATURE: 97.9 F | DIASTOLIC BLOOD PRESSURE: 83 MMHG | BODY MASS INDEX: 38.48 KG/M2

## 2023-11-30 DIAGNOSIS — C61 PROSTATE CANCER: Primary | ICD-10-CM

## 2023-11-30 PROCEDURE — G0463 HOSPITAL OUTPT CLINIC VISIT: HCPCS
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
## 2023-12-06 ENCOUNTER — HOSPITAL ENCOUNTER (OUTPATIENT)
Dept: GENERAL RADIOLOGY | Facility: HOSPITAL | Age: 68
Discharge: HOME | End: 2023-12-06
Admitting: FAMILY MEDICINE
Payer: MEDICARE

## 2023-12-06 ENCOUNTER — TRANSCRIBE ORDERS (OUTPATIENT)
Dept: ADMINISTRATIVE | Facility: HOSPITAL | Age: 68
End: 2023-12-06
Payer: MEDICARE

## 2023-12-06 DIAGNOSIS — M51.37 OTHER INTERVERTEBRAL DISC DEGENERATION, LUMBOSACRAL REGION: ICD-10-CM

## 2023-12-06 DIAGNOSIS — M54.89 OTHER DORSALGIA: ICD-10-CM

## 2023-12-06 DIAGNOSIS — M54.89 OTHER DORSALGIA: Primary | ICD-10-CM

## 2023-12-06 PROCEDURE — 72110 X-RAY EXAM L-2 SPINE 4/>VWS: CPT

## 2023-12-07 ENCOUNTER — TELEPHONE (OUTPATIENT)
Dept: CARDIOLOGY | Facility: CLINIC | Age: 68
End: 2023-12-07
Payer: MEDICARE

## 2023-12-07 NOTE — TELEPHONE ENCOUNTER
Wife called back and he is switching to a PPO plan.  She has discussed with insurance company about coverage for our office.

## 2023-12-07 NOTE — TELEPHONE ENCOUNTER
Called Mr Fierro in regards to his insurance/device care starting beginning of the year.  No answer, left message for a return call.

## 2023-12-11 ENCOUNTER — TELEPHONE (OUTPATIENT)
Dept: UROLOGY | Facility: CLINIC | Age: 68
End: 2023-12-11
Payer: MEDICARE

## 2023-12-11 NOTE — TELEPHONE ENCOUNTER
Hub called in to see if patient could still come into office to get a cysto since he has covid. Patient is on day 7 of covid but still having coughing symptoms. Patient is now rescheduled for 12/19/23 for cysto.

## 2023-12-19 ENCOUNTER — PROCEDURE VISIT (OUTPATIENT)
Dept: UROLOGY | Facility: CLINIC | Age: 68
End: 2023-12-19
Payer: MEDICARE

## 2023-12-19 VITALS — HEART RATE: 70 BPM | WEIGHT: 268 LBS | OXYGEN SATURATION: 95 % | HEIGHT: 70 IN | BODY MASS INDEX: 38.37 KG/M2

## 2023-12-19 DIAGNOSIS — R31.29 MICROHEMATURIA: ICD-10-CM

## 2023-12-19 DIAGNOSIS — N40.1 BPH WITH OBSTRUCTION/LOWER URINARY TRACT SYMPTOMS: ICD-10-CM

## 2023-12-19 DIAGNOSIS — N13.8 BPH WITH OBSTRUCTION/LOWER URINARY TRACT SYMPTOMS: ICD-10-CM

## 2023-12-19 DIAGNOSIS — I25.10 CORONARY ARTERY DISEASE INVOLVING NATIVE HEART, UNSPECIFIED VESSEL OR LESION TYPE, UNSPECIFIED WHETHER ANGINA PRESENT: ICD-10-CM

## 2023-12-19 DIAGNOSIS — C61 PROSTATE CANCER: Primary | ICD-10-CM

## 2023-12-19 DIAGNOSIS — R39.9 LOWER URINARY TRACT SYMPTOMS (LUTS): ICD-10-CM

## 2023-12-19 LAB
BILIRUB BLD-MCNC: ABNORMAL MG/DL
CLARITY, POC: CLEAR
COLOR UR: YELLOW
EXPIRATION DATE: ABNORMAL
GLUCOSE UR STRIP-MCNC: NEGATIVE MG/DL
KETONES UR QL: NEGATIVE
LEUKOCYTE EST, POC: NEGATIVE
Lab: ABNORMAL
NITRITE UR-MCNC: NEGATIVE MG/ML
PH UR: 6 [PH] (ref 5–8)
PROT UR STRIP-MCNC: NEGATIVE MG/DL
RBC # UR STRIP: NEGATIVE /UL
SP GR UR: 1.01 (ref 1–1.03)
UROBILINOGEN UR QL: NORMAL

## 2023-12-19 NOTE — PROGRESS NOTES
Follow Up Office Visit      Patient Name: Cecilio Fierro  : 1955   MRN: [id number]     Chief Complaint:    Chief Complaint   Patient presents with    Cystoscopy and Urine cytology       Referring Provider: No ref. provider found    History of Present Illness: Cecilio Fierro is a 68 y.o. male who presents today for follow up of multiple urologic concerns.  The patient has severe lower urinary tract symptoms and BPH with difficulty voiding, weak stream, straining, urgency and frequency.  He has been diagnosed with favorable intermediate risk prostate cancer by an outside urologist.  He was referred to me originally for concern for bladder cancer at which point a cystoscopy was performed in clinic on 10/13/2023, cystoscopy was negative.  Urine cytology was atypical.  He has been seen by radiation oncology and is planning for CyberKnife radiation therapy.  He presents today for further discussion regarding his multiple urologic issues in addition to a repeat cystoscopy to assess his bladder.    He was noted to have an elevated PSA >5, prompting a prostate biopsy which demonstrated GG1 and 2 disease (GG2 of right base and mid, 40-70% cores).              PSA history includes 2019 at 4.9, then 2019 at 5.3, then 2020 at 4.5, then 2020 at 3.5, then 2022 at 6.5, then 2022 at 5.3 with % free 12.5, then 3/2023 at 5.8 and 2023 at 4.1. Repeat PSA done 2023 at 3.9. He had MDx urine test performed showing 54% risk of prostate cancer with 26% risk of being [place] score 7 or higher.     He does have a history of recurrent UTI, states 2 UTIs within the last 6 months.  Was recently treated with outpatient antibiotics for a UTI diagnosed closer to his home.    Patient's prostate volume based on recent CT ordered by radiation oncology demonstrates a 53-66 g prostate.  He is managing with alfuzosin currently.                  Subjective      Review of System: Review of Systems   Genitourinary:  Positive  for difficulty urinating, frequency and urgency.      I have reviewed the ROS documented by my clinical staff, I have updated appropriately and I agree. Malcolm Barber MD    I have reviewed and the following portions of the patient's history were updated as appropriate: past family history, past medical history, past social history, past surgical history and problem list.    Medications:     Current Outpatient Medications:     alfuzosin (UROXATRAL) 10 MG 24 hr tablet, , Disp: , Rfl:     allopurinol (ZYLOPRIM) 300 MG tablet, , Disp: , Rfl:     ALPRAZolam XR (XANAX XR) 2 MG 24 hr tablet, Take one at 8 PM daily  Indications: anxiety disorder (Patient taking differently: Take one at 8 PM daily), Disp: 90 tablet, Rfl: 1    aspirin 325 MG tablet, Take 1 tablet by mouth Daily., Disp: , Rfl:     bumetanide (BUMEX) 1 MG tablet, Take 1 tablet by mouth 2 (Two) Times a Day., Disp: , Rfl:     cetirizine (zyrTEC) 10 MG tablet, Take 1 tablet by mouth Daily., Disp: , Rfl:     doxepin (SINEquan) 50 MG capsule, TAKE TWO CAPSULES BY MOUTH ONCE DAILY AT BEDTIME FOR ANXIETY/REST, Disp: 90 capsule, Rfl: 1    DULoxetine (Cymbalta) 60 MG capsule, Take one bid., Disp: 180 capsule, Rfl: 1    famotidine (PEPCID) 20 MG tablet, Take 1 tablet by mouth 2 (Two) Times a Day., Disp: , Rfl:     fexofenadine (ALLEGRA) 180 MG tablet, , Disp: , Rfl:     fluticasone (FLONASE) 50 MCG/ACT nasal spray, , Disp: , Rfl:     folic acid (FOLVITE) 1 MG tablet, Take 1 tablet by mouth Daily., Disp: , Rfl:     gabapentin (NEURONTIN) 800 MG tablet, TAKE 1 TABLET BY MOUTH THREE TIMES A DAY FOR NERVE PAIN (MAY CAUSE DROWSINESS), Disp: , Rfl:     methocarbamol (ROBAXIN) 750 MG tablet, Take 1 tablet by mouth 2 (Two) Times a Day., Disp: , Rfl:     metoprolol tartrate (LOPRESSOR) 25 MG tablet, Take 1 tablet by mouth 2 (Two) Times a Day., Disp: 180 tablet, Rfl: 3    montelukast (SINGULAIR) 10 MG tablet, Take 1 tablet by mouth Every Night., Disp: , Rfl:      "nitroglycerin (NITROSTAT) 0.4 MG SL tablet, Place 1 tablet under the tongue Every 5 (Five) Minutes As Needed for Chest Pain., Disp: , Rfl:     omeprazole (priLOSEC) 40 MG capsule, Take 1 capsule by mouth Daily., Disp: , Rfl:     pravastatin (PRAVACHOL) 40 MG tablet, Take 1 tablet by mouth Daily., Disp: , Rfl:     promethazine (PHENERGAN) 25 MG tablet, Take 1 tablet by mouth As Needed for Nausea or Vomiting., Disp: , Rfl:     rOPINIRole (REQUIP) 3 MG tablet, Take 1 tablet by mouth 2 (Two) Times a Day. Take 1 hour before bedtime., Disp: , Rfl:     traZODone (DESYREL) 100 MG tablet, TAKE one TABLETS BY MOUTH AT BEDTIME FOR SLEEP, Disp: 90 tablet, Rfl: 1    Wixela Inhub 250-50 MCG/DOSE DISKUS, Inhale 2 puffs As Needed., Disp: , Rfl:     Allergies:   Allergies   Allergen Reactions    Morphine Itching, Other (See Comments), Unknown (See Comments) and Unknown - Low Severity     urinary retention    urinary retention   urinary retention    Other reaction(s): Unknown   urinary retention   urinary retention   urinary retention   urinary retention    Ondansetron Itching, Unknown (See Comments), Unknown - Low Severity and Rash     Caused nausea and itching.     Caused nausea and itching.    Caused nausea and itching.    Levaquin [Levofloxacin] Hives    Adhesive Tape Rash    Tramadol Rash    Zofran [Ondansetron Hcl] Other (See Comments)     Caused nausea and itching.       Post void residual bladder scan:   5 mL     Objective     Physical Exam:   Vital Signs:   Vitals:    12/19/23 1313   Pulse: 70   SpO2: 95%   Weight: 122 kg (268 lb)   Height: 177.8 cm (70\")   PainSc: 0-No pain     Body mass index is 38.45 kg/m².     Physical Exam  Vitals and nursing note reviewed.   Constitutional:       Appearance: Normal appearance. He is obese.   HENT:      Head: Normocephalic and atraumatic.      Mouth/Throat:      Mouth: Mucous membranes are moist.      Pharynx: Oropharynx is clear.   Eyes:      Extraocular Movements: Extraocular " movements intact.      Conjunctiva/sclera: Conjunctivae normal.   Cardiovascular:      Rate and Rhythm: Normal rate and regular rhythm.   Pulmonary:      Effort: Pulmonary effort is normal. No respiratory distress.   Abdominal:      Palpations: Abdomen is soft.      Tenderness: There is no abdominal tenderness. There is no right CVA tenderness or left CVA tenderness.      Comments: Large mid line incision no hernia    Genitourinary:     Comments:  Circumcised phallus, orthotopic meatus, bilaterally descended testicles without masses, or lesions.        Musculoskeletal:         General: Normal range of motion.      Cervical back: Normal range of motion.   Skin:     General: Skin is warm and dry.   Neurological:      General: No focal deficit present.      Mental Status: He is alert and oriented to person, place, and time.   Psychiatric:         Mood and Affect: Mood normal.         Behavior: Behavior normal.         Labs:   Brief Urine Lab Results  (Last result in the past 365 days)        Color   Clarity   Blood   Leuk Est   Nitrite   Protein   CREAT   Urine HCG        12/19/23 1334 Yellow   Clear   Negative   Negative   Negative   Negative                   Urine Culture          10/12/2023    17:07   Urine Culture   Urine Culture No growth         Lab Results   Component Value Date    GLUCOSE 110 (H) 06/16/2023    CALCIUM 9.9 06/16/2023     06/16/2023    K 3.5 06/16/2023    CO2 28.5 06/16/2023     06/16/2023    BUN 27 (H) 06/16/2023    CREATININE 1.50 (H) 11/21/2023    EGFRIFNONA 68 04/18/2018    BCR 18.1 06/16/2023    ANIONGAP 10.5 06/16/2023       Lab Results   Component Value Date    WBC 13.61 (H) 04/18/2018    HGB 12.4 (L) 04/18/2018    HCT 39.5 04/18/2018    MCV 88.4 04/18/2018     04/18/2018       Images:   XR Spine Lumbar Complete 4+VW    Result Date: 12/6/2023    Degenerative changes lumbar spine as described.   This report was finalized on 12/6/2023 1:08 PM by Dr. Colt Ramirez MD.       CT Abdomen Pelvis With Contrast    Result Date: 11/21/2023   1. No evidence of metastatic disease to the abdomen or pelvis  2. Other incidental findings as discussed above.          This report was finalized on 11/21/2023 10:01 AM by Dr. Ben Espinoza MD.        Measures:   Tobacco:   Cecilio Fierro  reports that he has been smoking cigarettes. He has a 11.75 pack-year smoking history. He has been exposed to tobacco smoke. He has never used smokeless tobacco.    =================    Preprocedure diagnosis  BPH with LUTS    Postprocedure diagnosis  BPH with LUTS    Procedure  Flexible Cystourethroscopy   Transrectal Ultrasound Guided Prostate Sizing  Uroflowmetry + PVR    Attending surgeon  Malcolm Barber MD    Anesthesia  2% lidocaine jelly intraurethrally    Complications  None    Indications  68 y.o. male undergoing a flexible cystoscopy for the above mentioned indications.  Informed consent was obtained.      Findings  No obvious urethral stricture, bladder tumor, bladder stones, or urothelial lesions identified.    SEVERE lateral lobe hyperplasia, no obvious intravesical median lobe.  Mild trabeculation, chronic findings indicative of likely chronic bladder outlet obstruction      Procedure  The patient was placed in supine position and prepped and draped in sterile fashion with lidocaine jelly instilled per the urethra for local anesthesia 5 minutes prior to procedure start.  A brief timeout was performed including available nursing staff and the patient.      The 16 Fr digital flexible cystoscope was lubricated and gently advanced into the urethral meatus.     Penile and bulbar urethra appeared widely patent without visible lesion or stricture.   Prostatic urethra demonstrates severe lateral lobe coaptation, with no obvious median lobe component.   Scope then advanced into the bladder. Bladder was completely visualized starting with the trigone.   There were bilateral orthotopic ureteral orifices  effluxing clear urine.   Posterior, lateral, anterior walls, and dome completely visualized revealing NO obvious mucosal lesions, tumors, or bladder stones.    There is mild trabeculation along the posterior and lateral walls with no bladder divericuli.     Cystoscope was retroflexed and bladder neck and prostate further visualized confirming intravesical lateral and no median lobe impinging on bladder basin.     The bladder was left filled, and the cystoscope was then gently withdrawn while visualizing the urethra and the procedure was then terminated.      The patient tolerated the procedure well.      He then voided for uroflowmetry measurement and PVR bladder scan was performed afterwards.     Discussion:  The patient was moved from the procedure room to a clinic examination room to review results of BPH workup today.     Uroflow   Avg flow: 5.9  ml/sec  Max flow: 11.2 ml/sec  Time to max flow: 13.2 sec  Voided volume: 305 mL     PVR bladder scan: 5 mL     I have evaluated the patient's ongoing urinary symptoms and options for management with the patient today.     The patient's cystoscopy and uroflow results indicate likely obstruction     Prostate volume is enlarged.     The patient was counseled regarding options regarding his continued BPH with lower urinary tract symptoms. He is currently medically managed with Alfuzosin    Medical Options vs Continued Observation  Continued monitoring may be appropriate but this is a shared decision made with patient based on assumed risk.     Continued alpha blocker therapy, the addition of 5-alpha reductase inhibitors (if not already prescribed), or additional anticholinergic/beta 3-agonist medications discussed as options.    Surgical Options  Decision to proceed with surgical intervention is multi-factorial and based on patient degree of bother, presumed risk of bladder health decline over time, desire to limit or reduce medication usage (polypharmacy), and avoidance  of potential side effects of BPH medication usage (light headedness, hypotension, sexual dysfunction, retrograde ejaculation, dry eye/mouth, memory changes, etc).      Surgical options include transurethral resection of prostate, greenlight photo vaporization of prostate, Urolift (prostatic urethral lift), Aquablation, robotic simple prostatectomy (for extreme BPH).     Downsides to TURP include: likely need for overnight inpatient admission, higher bleeding risk, need for bladder irrigation.     Greenlight PVP benefits include: reduced bleeding risk, possible outpatient procedure but does require catheter placement for 48 hours or more or minimally invasive     Urolift benefits include: typically outpatient, often no catheter required post-op, preserves ejaculation without ED risk, prostate volume must be less than 100 cc, good data out to 5 years+ currently, may need additional more aggressive surgery long term if re-growth/re-obstruction, re-treatment rate typically listed as 15% or more long term.     I discussed that both TURP and Greenlight PVP will result in retrograde ejaculation or possibly anejaculation depending on aggressiveness of tissue removal, small risk of transient vs long term (rare) stress incontinence, small risk of urethral stricture or bladder neck contracture, ureteral injury, tissue regrowth requiring additional procedure long term, bladder perforation requiring repair, infection, hematuria, and anesthetic related complications not withstanding. Greenlight PVP and TURP may result in possible better long term symptom reduction vs Urolift however long term data is still accumulating regarding Urolift.        Assessment / Plan      Assessment/Plan:   68 y.o. male who presented today for follow up of favorable intermediate risk prostate cancer and progressively worsening lower urinary tract symptoms in the setting of BPH.      Cystoscopy demonstrates no obvious bladder malignancy, an initial  concern by the referring prior outside urologist.      This patient has seen radiation oncology Dr. Lockhart and is planning for CyberKnife.  I recommended bladder outlet therapy before CyberKnife to relieve his outlet obstruction given his progressive BPH symptoms and recurrent UTI concerns, this may worsen his symptoms during CyberKnife therapy if bladder outlet surgery is not performed prior.  I recommend UroLift, the least invasive option for the patient given he has some cardiac comorbidities including CAD s/p stents, pacemaker.  The risk of UroLift include bleeding, infection, need for catheter, additional procedures, anesthesia related complications like CAD, DVT, PE, MI, CVA, death.    We will need to obtain cardiac clearance prior to UroLift placement.  We will plan for UroLift in the main OR.    Discussed if we perform a UroLift prior to radiation we will not need to place fiducial markers prior to CyberKnife.  I will reach out to Dr. Lockhart to discuss whether he is comfortable performing CyberKnife after UroLift placement.    WILL PLAN FOR UROLIFT 1/5/23   NEEDS ASAP CARDIAC CLEARANCE PRIOR       Diagnoses and all orders for this visit:    1. Prostate cancer (Primary)  -     POC Urinalysis Dipstick, Automated    2. Microhematuria [R31.29]    3. BPH with obstruction/lower urinary tract symptoms  -     Case Request; Standing  -     CBC (No Diff); Future  -     Basic Metabolic Panel; Future  -     Urinalysis without microscopic (no culture) - Urine, Clean Catch; Future  -     ECG 12 Lead; Future  -     ceFAZolin (ANCEF) 3,000 mg in sodium chloride 0.9 % 100 mL IVPB  -     Case Request    4. Lower urinary tract symptoms (LUTS)  -     Case Request; Standing  -     CBC (No Diff); Future  -     Basic Metabolic Panel; Future  -     Urinalysis without microscopic (no culture) - Urine, Clean Catch; Future  -     ECG 12 Lead; Future  -     ceFAZolin (ANCEF) 3,000 mg in sodium chloride 0.9 % 100 mL IVPB  -     Case  Request    5. Coronary artery disease involving native heart, unspecified vessel or lesion type, unspecified whether angina present  -     Ambulatory Referral to Cardiology    Other orders  -     Follow Anesthesia Guidelines / Protocol; Future  -     Provide NPO Instructions to Patient; Future  -     Provide Hydration Instructions to Patient; Future  -     Provide Chlorhexidine Skin Prep Wipes and Instructions; Future  -     Obtain Informed Consent; Future  -     Nursing to Order Blood Glucose on all Inpatients >18 years Old with not BMP Ordered; Future  -     Nursing to Place Order for HgbA1c on Adult Patients >18 Years Old (HgbA1c Within One Month of Admission Acceptable); Future  -     Follow Anesthesia Guidelines / Protocol; Standing  -     Verify NPO Status; Standing  -     Verify the Time Patient Completed [place] / G2; Standing  -     Nurse to Contact Surgeon for Cardiology Consult if Indicated; Future  -     Nurse to Consult  Anesthesia if Indicated; Future           Follow Up:   No follow-ups on file.    I spent approximately 40 minutes providing clinical care for this patient; including review of patient's chart and provider documentation, face to face time spent with patient in examination room (obtaining history, performing physical exam, discussing diagnosis and management options), placing orders, and completing patient documentation.     Maloclm Barber MD  [facility] [place]

## 2023-12-21 PROBLEM — N40.1 BPH WITH OBSTRUCTION/LOWER URINARY TRACT SYMPTOMS: Status: ACTIVE | Noted: 2023-12-19

## 2023-12-21 PROBLEM — R39.9 LOWER URINARY TRACT SYMPTOMS (LUTS): Status: ACTIVE | Noted: 2023-12-19

## 2023-12-21 PROBLEM — N13.8 BPH WITH OBSTRUCTION/LOWER URINARY TRACT SYMPTOMS: Status: ACTIVE | Noted: 2023-12-19

## 2024-01-02 ENCOUNTER — OFFICE VISIT (OUTPATIENT)
Dept: CARDIOLOGY | Facility: CLINIC | Age: 69
End: 2024-01-02
Payer: MEDICARE

## 2024-01-02 ENCOUNTER — PRE-ADMISSION TESTING (OUTPATIENT)
Dept: PREADMISSION TESTING | Facility: HOSPITAL | Age: 69
End: 2024-01-02
Payer: MEDICARE

## 2024-01-02 VITALS
OXYGEN SATURATION: 97 % | BODY MASS INDEX: 37.94 KG/M2 | HEART RATE: 96 BPM | DIASTOLIC BLOOD PRESSURE: 70 MMHG | HEIGHT: 70 IN | SYSTOLIC BLOOD PRESSURE: 130 MMHG | WEIGHT: 265 LBS

## 2024-01-02 VITALS — BODY MASS INDEX: 38 KG/M2 | WEIGHT: 265.43 LBS | HEIGHT: 70 IN

## 2024-01-02 DIAGNOSIS — I25.10 CORONARY ARTERY DISEASE INVOLVING NATIVE CORONARY ARTERY OF NATIVE HEART WITHOUT ANGINA PECTORIS: Primary | ICD-10-CM

## 2024-01-02 DIAGNOSIS — N13.8 BPH WITH OBSTRUCTION/LOWER URINARY TRACT SYMPTOMS: ICD-10-CM

## 2024-01-02 DIAGNOSIS — N40.1 BPH WITH OBSTRUCTION/LOWER URINARY TRACT SYMPTOMS: ICD-10-CM

## 2024-01-02 DIAGNOSIS — R39.9 LOWER URINARY TRACT SYMPTOMS (LUTS): ICD-10-CM

## 2024-01-02 DIAGNOSIS — R06.02 SOB (SHORTNESS OF BREATH): ICD-10-CM

## 2024-01-02 LAB
ANION GAP SERPL CALCULATED.3IONS-SCNC: 12 MMOL/L (ref 5–15)
BILIRUB UR QL STRIP: NEGATIVE
BUN SERPL-MCNC: 19 MG/DL (ref 8–23)
BUN/CREAT SERPL: 14.4 (ref 7–25)
CALCIUM SPEC-SCNC: 9.6 MG/DL (ref 8.6–10.5)
CHLORIDE SERPL-SCNC: 103 MMOL/L (ref 98–107)
CLARITY UR: CLEAR
CO2 SERPL-SCNC: 27 MMOL/L (ref 22–29)
COLOR UR: YELLOW
CREAT SERPL-MCNC: 1.32 MG/DL (ref 0.76–1.27)
DEPRECATED RDW RBC AUTO: 49.1 FL (ref 37–54)
EGFRCR SERPLBLD CKD-EPI 2021: 59.5 ML/MIN/1.73
ERYTHROCYTE [DISTWIDTH] IN BLOOD BY AUTOMATED COUNT: 14.2 % (ref 12.3–15.4)
GLUCOSE SERPL-MCNC: 118 MG/DL (ref 65–99)
GLUCOSE UR STRIP-MCNC: NEGATIVE MG/DL
HCT VFR BLD AUTO: 47.2 % (ref 37.5–51)
HGB BLD-MCNC: 16 G/DL (ref 13–17.7)
HGB UR QL STRIP.AUTO: NEGATIVE
KETONES UR QL STRIP: NEGATIVE
LEUKOCYTE ESTERASE UR QL STRIP.AUTO: NEGATIVE
MCH RBC QN AUTO: 32.3 PG (ref 26.6–33)
MCHC RBC AUTO-ENTMCNC: 33.9 G/DL (ref 31.5–35.7)
MCV RBC AUTO: 95.4 FL (ref 79–97)
NITRITE UR QL STRIP: NEGATIVE
PH UR STRIP.AUTO: 5.5 [PH] (ref 5–8)
PLATELET # BLD AUTO: 217 10*3/MM3 (ref 140–450)
PMV BLD AUTO: 9 FL (ref 6–12)
POTASSIUM SERPL-SCNC: 3.8 MMOL/L (ref 3.5–5.2)
PROT UR QL STRIP: NEGATIVE
RBC # BLD AUTO: 4.95 10*6/MM3 (ref 4.14–5.8)
SODIUM SERPL-SCNC: 142 MMOL/L (ref 136–145)
SP GR UR STRIP: 1.02 (ref 1–1.03)
UROBILINOGEN UR QL STRIP: NORMAL
WBC NRBC COR # BLD AUTO: 10.18 10*3/MM3 (ref 3.4–10.8)

## 2024-01-02 PROCEDURE — 85027 COMPLETE CBC AUTOMATED: CPT

## 2024-01-02 PROCEDURE — 36415 COLL VENOUS BLD VENIPUNCTURE: CPT

## 2024-01-02 PROCEDURE — 80048 BASIC METABOLIC PNL TOTAL CA: CPT

## 2024-01-02 PROCEDURE — 81003 URINALYSIS AUTO W/O SCOPE: CPT

## 2024-01-02 RX ORDER — TAMSULOSIN HYDROCHLORIDE 0.4 MG/1
1 CAPSULE ORAL DAILY
COMMUNITY
Start: 2023-12-28

## 2024-01-02 NOTE — PROGRESS NOTES
"[facility] at [facility]   OFFICE NOTE      Cecilio Fierro  1955  PCP: Praveen Odell MD    SUBJECTIVE:   Cecilio Fierro is a 68 y.o. male seen for a follow up visit regarding the following:     CC:Bradycardia    HPI:   This is a pleasant 68-year-old gentleman who is a retired ,  from [facility] presents today with his wife for preop evaluation prior to prostate surgery and in to reestablish care with our office.  The patient remotely has had a pacemaker placed in 2003 for symptomatic bradycardia.  He had a generator change last in 2018.  He he has known history of atrial tachycardia but is not symptomatic and has rarely noted any palpitations.  In addition to this he had multiple heart caths and a previous stent in [facility] remotely.  He has been followed [facility] more recently.  He denies any chest pain or chest tightness suggesting angina pectoris.  He denies any heart failure symptoms like orthopnea PND peripheral edema.  His last stress test was in 2021 revealing no evidence of ischemia.  Echocardiogram was normal.  He has some mild shortness of breath but does smoke cigarettes.  He also recently had COVID.  He wishes to establish care with our office again after a two year hiatus and is scheduled see Dr. Mode Yarbrough in February 2024.  He presents today for further cardiac evaluation prior to prostate surgery.    Cardiac PMH: (Old records have been reviewed and summarized below)  Coronary artery disease:  History of \"9 heart caths\" at the [facility].   Remote BMS to RCA.  Referral to [facility] for LV dysfunction/diagonal artery stenosis, March 2008.   Normal echo EF and nonobstructive CAD on review of 01/24/2008 angiogram.   Nonobstructive disease by catheterization, 11/20/2010.   Normal myocardial perfusion study with a left heart catheterization, 02/25/2015, with minor nonobstructive disease.   Negative stress test 2021, No " "ischemia   Echo Normal EF , Mild diastolic dysfunction, LA 4.8cm, trace AI, Mild aortic root dilation 4.2cm 2021  Symptomatic bradycardia:  St. Prakash dual-chamber pacemaker implant by Dr. Chawla, October 2003.   Generator change 02/14/2007, St. Prakash Victory XL.   Gen change scheduled, 2/2018 (Noa)  Questionable TIA vs. seizure, June 2014.   Negative head CT and carotid duplex.  Residual left facial droop and speech impairment, July 2014.   Hypertension.   New diagnosis of prostate cancer, surgial revision, five raditation, cyper knife.   Dyslipidemia.   Tobacco abuse  Diverticulosis.   Obesity.  Anxiety/depression.   Neuropathy.  Recent COVID  \"Bleeding hemorrhoids\" reported winter 2018.  Right adrenal gland tumor followed by Dr. Alexandra Rosales, [facility]  Right adrenalectomy, 2019   Surgical history:   Lipoma resection from abdominal wall.   Back surgery.   Cholecystectomy.   Right knee surgery, 2014.   Right knee replacement.  Bullet removal.  Right adrenalectomy    Past Medical History, Past Surgical History, Family history, Social History, and Medications were all reviewed with the patient today and updated as necessary.       Current Outpatient Medications:     allopurinol (ZYLOPRIM) 300 MG tablet, , Disp: , Rfl:     ALPRAZolam XR (XANAX XR) 2 MG 24 hr tablet, Take one at 8 PM daily  Indications: anxiety disorder (Patient taking differently: Take one at 8 PM daily), Disp: 90 tablet, Rfl: 1    aspirin 325 MG tablet, Take 1 tablet by mouth Daily., Disp: , Rfl:     bumetanide (BUMEX) 1 MG tablet, Take 1 tablet by mouth 2 (Two) Times a Day., Disp: , Rfl:     cetirizine (zyrTEC) 10 MG tablet, Take 1 tablet by mouth Daily., Disp: , Rfl:     doxepin (SINEquan) 50 MG capsule, TAKE TWO CAPSULES BY MOUTH ONCE DAILY AT BEDTIME FOR ANXIETY/REST, Disp: 90 capsule, Rfl: 1    DULoxetine (Cymbalta) 60 MG capsule, Take one bid., Disp: 180 capsule, Rfl: 1    fexofenadine (ALLEGRA) 180 MG tablet, , Disp: , Rfl:     fluticasone (FLONASE) " 50 MCG/ACT nasal spray, , Disp: , Rfl:     folic acid (FOLVITE) 1 MG tablet, Take 1 tablet by mouth Daily., Disp: , Rfl:     gabapentin (NEURONTIN) 800 MG tablet, TAKE 1 TABLET BY MOUTH THREE TIMES A DAY FOR NERVE PAIN (MAY CAUSE DROWSINESS), Disp: , Rfl:     methocarbamol (ROBAXIN) 750 MG tablet, Take 1 tablet by mouth 2 (Two) Times a Day., Disp: , Rfl:     metoprolol tartrate (LOPRESSOR) 25 MG tablet, Take 1 tablet by mouth 2 (Two) Times a Day., Disp: 180 tablet, Rfl: 3    montelukast (SINGULAIR) 10 MG tablet, Take 1 tablet by mouth Every Night., Disp: , Rfl:     nitroglycerin (NITROSTAT) 0.4 MG SL tablet, Place 1 tablet under the tongue Every 5 (Five) Minutes As Needed for Chest Pain., Disp: , Rfl:     omeprazole (priLOSEC) 40 MG capsule, Take 1 capsule by mouth Daily., Disp: , Rfl:     pravastatin (PRAVACHOL) 40 MG tablet, Take 1 tablet by mouth Daily., Disp: , Rfl:     promethazine (PHENERGAN) 25 MG tablet, Take 1 tablet by mouth As Needed for Nausea or Vomiting., Disp: , Rfl:     rOPINIRole (REQUIP) 3 MG tablet, Take 1 tablet by mouth 2 (Two) Times a Day. Take 1 hour before bedtime., Disp: , Rfl:     tamsulosin (FLOMAX) 0.4 MG capsule 24 hr capsule, , Disp: , Rfl:     traZODone (DESYREL) 100 MG tablet, TAKE one TABLETS BY MOUTH AT BEDTIME FOR SLEEP, Disp: 90 tablet, Rfl: 1    Wixela Inhub 250-50 MCG/DOSE DISKUS, Inhale 2 puffs As Needed., Disp: , Rfl:     famotidine (PEPCID) 20 MG tablet, Take 1 tablet by mouth 2 (Two) Times a Day. (Patient not taking: Reported on 1/2/2024), Disp: , Rfl:       Allergies   Allergen Reactions    Morphine Itching, Other (See Comments), Unknown (See Comments) and Unknown - Low Severity     urinary retention    urinary retention   urinary retention    Other reaction(s): Unknown   urinary retention   urinary retention   urinary retention   urinary retention    Ondansetron Itching, Unknown (See Comments), Unknown - Low Severity and Rash     Caused nausea and itching.     Caused  "nausea and itching.    Caused nausea and itching.    Levaquin [Levofloxacin] Hives    Adhesive Tape Rash    Tramadol Rash    Zofran [Ondansetron Hcl] Other (See Comments)     Caused nausea and itching.          PHYSICAL EXAM:    /70 (BP Location: Right arm, Patient Position: Sitting, Cuff Size: Large Adult)   Pulse 96   Ht 177.8 cm (70\")   Wt 120 kg (265 lb)   SpO2 97%   BMI 38.02 kg/m²        Wt Readings from Last 5 Encounters:   01/02/24 120 kg (265 lb)   12/19/23 122 kg (268 lb)   11/30/23 122 kg (268 lb 3.2 oz)   11/02/23 118 kg (259 lb 4.8 oz)   08/01/23 122 kg (268 lb 9.6 oz)       BP Readings from Last 5 Encounters:   01/02/24 130/70   11/30/23 139/83   11/02/23 140/89   10/12/23 124/80   08/01/23 117/72       General appearance - Alert, well appearing, and in no distress   Mental status - Affect appropriate to mood.  Eyes - Sclerae anicteric,  ENMT - Hearing grossly normal bilaterally, Dental hygiene good.  Neck - Carotids upstroke normal bilaterally, no bruits, no JVD.  Resp - few scattered Wheezes, cleared with cough.   Heart - Normal rate, regular rhythm, normal S1, S2, no murmurs, rubs, clicks or gallops.  GI - Soft, nontender, nondistended, no masses or organomegaly.  Neurological - Grossly intact - normal speech, no focal findings  Musculoskeletal - Knee pain  Extremities - Peripheral pulses normal, trace pedal edema, no clubbing or cyanosis.  Skin - Normal coloration and turgor.  Psych -  oriented to person, place, and time.    Medical problems and test results were reviewed with the patient today.       EKG: (EKG has been independently visualized by me and summarized below)    ECG 12 Lead    Date/Time: 1/2/2024 10:48 AM  Performed by: Issa Greene PA    Authorized by: Issa Greene PA  Comparison: compared with previous ECG from 11/20/2021  Similar to previous ECG  Rhythm: sinus rhythm  Rate: normal  Conduction: conduction normal  ST Segments: ST segments normal  T Waves: T " waves normal  QRS axis: normal    Clinical impression: normal ECG         Surgical Cardiac Risk Assessment    Patient Name: Cecilio Fierro : 1955 MRN: [id number]      Revised Cardiac Risk Index (RCRI)     Clinical Risk Factors  Check if Present or Past    History    High-risk type of surgery (examples include vascular surgery and any open intraperitoneal or intrathoracic procedure) []   +1 point     History of ischemic heart disease (history of myocardial infarction or a positive exercise test, current complaint of chest pain considered to be secondary to myocardial ischemia, use of nitrate therapy, or ECG with pathological Q waves; do not count prior coronary revascularization procedures unless one of the other criteria for ischemic heart disease is present) []   +1 point     History of heart failure []   +1 point     History of cerebrovascular disease [x]   +1 point     Diabetes mellitus requiring treatment with insulin   []   +1 point   Preoperative serum creatinine >2.0 mg/dL   (177 micromol/L)   []   +1 point       Rate of cardiac death, nonfatal myocardial infarction, and nonfatal cardiac arrest according to the number of predictors   Total Points       [] 0= 0.4% (95% CI 0.1-0.8)       [x] 1= 1.0% (95% CI 0.5-1.4)       [] 2= 2.4% (95% CI 1.3-3.5)       [] 3 or more= 5.4% (95% CI 2.8-7.9)         Rate of myocardial infarction, pulmonary edema, ventricular fibrillation, primary cardiac arrest, and complete heart block   Total Points       [] 0= 0.5% (95% CI 0.2-1.1)       [x] 1= 1.3% (95% CI 0.7-2.1)       [] 2= 3.6% (95% CI 2.1-5.6)       [] 3 or more= 9.1% (95% CI 5.5-13.8)     Recommendations    [x] Careful hemodynamic control (I.e, HR 60-70 bpm, no hypotension)  [x] If patient has been on beta blocker, continue throughout pre-operative period, if stable BP.  [x] Continue pre-op statin if no contraindications  [x] Hold ACE/ARB/Entresto 24 hours before and after if stable BP.  [x] Avoid initiation  of Clonidine pre-op.   [x] EKG post op recovery room if risk is > 5%.    Device Interrogation:  Please see device interrogation form which has been signed and updated for full details.  [employer] Prakash pacemaker.  DDDR at 70.  A paced 60%.  RV paced less than 1%.  P wave 4.7 mV.  R wave 12 mV.  Normal thresholds impedances.  Battery voltage 4.7 years remaining.  Less than 1% mode switch.    ASSESSMENT   1. AT: Minimally symptomatic, less than 1% mode switch.    2. SSS: Pacemaker-normal interrogation today.  Battery life 4.7 years.  A paced 60% RV paced less than 1%.  No A-fib.    3. CAD: Previously followed by Dr. Ortega.  Stress test 2021 no ischemia normal EF.  Patient is not having any angina symptoms.    4. HTN: He reports that at home it is normally controlled.     5.  Ongoing tobacco abuse, discussed in great detail the patient needs to immediately stop smoking we discussed the dangers of him not doing so.    6.  New diagnosis of prostate cancer followed by Dr. Barber which will require surgical revision, radiation therapy    PLAN  The patient denies any angina or heart failure symptoms.  He has a revised cardiac risk index less than 2%.  Recommend follow-up echocardiogram regarding LV function.  He has a normal pacemaker interrogation today.  Did discuss the patient he needs to stop smoking immediately and he understands this increases his risk of adverse outcomes during surgery including MI and CVA.   Overall he is considered acceptable risk to pursue prostate surgery.  He will keep his scheduled follow-up appoint with Dr. Mode Yarbrough and Dr. [facility].      1/2/2024  09:50 EST    Electronically signed by YUMIKO Paul, 01/02/24, 10:52 AM EST.

## 2024-01-02 NOTE — DISCHARGE INSTRUCTIONS
Patient instructed to bring CPAP mask and tubing to the hospital for overnight stay.  Explained that it is not necessary to bring their CPAP machine to the hospital instead a CPAP machine will be provided for use by the hospital. If patient knows their CPAP settings, those settings will be implemented.  If not, the CPAP machine will be utilized on the auto setting using their mask and tubing.    Patient verbalized understanding.     Patient instructed to drink 20 ounces of Gatorade or Gatorlyte (if diabetic) and it needs to be completed 1 hour (for Main OR patients) or 2 hours (scheduled  section & [facility] patients) before given arrival time for procedure (NO RED Gatorade and NO Gatorade Zero).    Patient verbalized understanding.

## 2024-01-02 NOTE — PAT
I have called to have the EKG from cardiology this morning faxed to PAT.Pt did not want to repeat since it was already done this morning.    See Will Oscar RODRIGUEZ note from [facility]     An arrival time for procedure was not provided during PAT visit. If patient had any questions or concerns about their arrival time, they were instructed to contact their surgeon/physician.  Additionally, if the patient referred to an arrival time that was acquired from their my chart account, patient was encouraged to verify that time with their surgeon/physician. Arrival times are NOT provided in Pre Admission Testing Department.     Patient denies any current skin issues.      Patient instructed to bring CPAP mask and tubing to the hospital for overnight stay.  Explained that it is not necessary to bring their CPAP machine to the hospital instead a CPAP machine will be provided for use by the hospital. If patient knows their CPAP settings, those settings will be implemented.  If not, the CPAP machine will be utilized on the auto setting using their mask and tubing.    Patient verbalized understanding.     Patient instructed to drink 20 ounces of Gatorade or Gatorlyte (if diabetic) and it needs to be completed 1 hour (for Main OR patients) or 2 hours (scheduled  section & [facility] patients) before given arrival time for procedure (NO RED Gatorade and NO Gatorade Zero).    Patient verbalized understanding.

## 2024-01-03 ENCOUNTER — HOSPITAL ENCOUNTER (OUTPATIENT)
Dept: CARDIOLOGY | Facility: HOSPITAL | Age: 69
Discharge: HOME | End: 2024-01-03
Admitting: PHYSICIAN ASSISTANT
Payer: MEDICARE

## 2024-01-03 DIAGNOSIS — I25.10 CORONARY ARTERY DISEASE INVOLVING NATIVE CORONARY ARTERY OF NATIVE HEART WITHOUT ANGINA PECTORIS: ICD-10-CM

## 2024-01-03 DIAGNOSIS — R06.02 SOB (SHORTNESS OF BREATH): ICD-10-CM

## 2024-01-03 PROCEDURE — 93306 TTE W/DOPPLER COMPLETE: CPT

## 2024-01-07 LAB
BH CV ECHO MEAS - AO MAX PG: 3.1 MMHG
BH CV ECHO MEAS - AO MEAN PG: 2 MMHG
BH CV ECHO MEAS - AO ROOT DIAM: 4.1 CM
BH CV ECHO MEAS - AO V2 MAX: 87.5 CM/SEC
BH CV ECHO MEAS - AO V2 VTI: 17.9 CM
BH CV ECHO MEAS - EDV(CUBED): 140.6 ML
BH CV ECHO MEAS - EDV(MOD-SP4): 47.2 ML
BH CV ECHO MEAS - EF(MOD-SP4): 49.4 %
BH CV ECHO MEAS - ESV(CUBED): 79.5 ML
BH CV ECHO MEAS - ESV(MOD-SP4): 23.9 ML
BH CV ECHO MEAS - FS: 17.3 %
BH CV ECHO MEAS - IVS/LVPW: 1 CM
BH CV ECHO MEAS - IVSD: 1.4 CM
BH CV ECHO MEAS - LA DIMENSION: 5.1 CM
BH CV ECHO MEAS - LAT PEAK E' VEL: 4.2 CM/SEC
BH CV ECHO MEAS - LV DIASTOLIC VOL/BSA (35-75): 20.1 CM2
BH CV ECHO MEAS - LV MASS(C)D: 309.6 GRAMS
BH CV ECHO MEAS - LV SYSTOLIC VOL/BSA (12-30): 10.2 CM2
BH CV ECHO MEAS - LVIDD: 5.2 CM
BH CV ECHO MEAS - LVIDS: 4.3 CM
BH CV ECHO MEAS - LVOT AREA: 5.7 CM2
BH CV ECHO MEAS - LVOT DIAM: 2.7 CM
BH CV ECHO MEAS - LVPWD: 1.4 CM
BH CV ECHO MEAS - MED PEAK E' VEL: 3.5 CM/SEC
BH CV ECHO MEAS - MV A MAX VEL: 84.2 CM/SEC
BH CV ECHO MEAS - MV DEC SLOPE: 144 CM/SEC2
BH CV ECHO MEAS - MV DEC TIME: 0.29 SEC
BH CV ECHO MEAS - MV E MAX VEL: 41.1 CM/SEC
BH CV ECHO MEAS - MV E/A: 0.49
BH CV ECHO MEAS - PA ACC TIME: 0.1 SEC
BH CV ECHO MEAS - SI(MOD-SP4): 9.9 ML/M2
BH CV ECHO MEAS - SV(MOD-SP4): 23.3 ML
BH CV ECHO MEAS - TAPSE (>1.6): 2.39 CM
BH CV ECHO MEASUREMENTS AVERAGE E/E' RATIO: 10.68
LEFT ATRIUM VOLUME INDEX: 19.4 ML/M2

## 2024-01-17 ENCOUNTER — TELEPHONE (OUTPATIENT)
Dept: UROLOGY | Facility: CLINIC | Age: 69
End: 2024-01-17

## 2024-01-25 DIAGNOSIS — Z79.899 MEDICATION MANAGEMENT: ICD-10-CM

## 2024-01-25 DIAGNOSIS — F33.40 RECURRENT MAJOR DEPRESSIVE DISORDER, IN REMISSION: ICD-10-CM

## 2024-01-25 RX ORDER — DOXEPIN HYDROCHLORIDE 50 MG/1
CAPSULE ORAL
Qty: 90 CAPSULE | Refills: 1 | Status: SHIPPED | OUTPATIENT
Start: 2024-01-25

## 2024-01-25 RX ORDER — TRAZODONE HYDROCHLORIDE 100 MG/1
TABLET ORAL
Qty: 90 TABLET | Refills: 1 | Status: SHIPPED | OUTPATIENT
Start: 2024-01-25

## 2024-01-25 RX ORDER — DULOXETIN HYDROCHLORIDE 60 MG/1
CAPSULE, DELAYED RELEASE ORAL
Qty: 180 CAPSULE | Refills: 1 | Status: SHIPPED | OUTPATIENT
Start: 2024-01-25

## 2024-01-25 RX ORDER — ALPRAZOLAM 2 MG/1
TABLET, EXTENDED RELEASE ORAL
Qty: 90 TABLET | Refills: 0 | Status: SHIPPED | OUTPATIENT
Start: 2024-01-25

## 2024-01-26 ENCOUNTER — ANESTHESIA EVENT (OUTPATIENT)
Dept: PERIOP | Facility: HOSPITAL | Age: 69
End: 2024-01-26
Payer: MEDICARE

## 2024-01-26 ENCOUNTER — ANESTHESIA (OUTPATIENT)
Dept: PERIOP | Facility: HOSPITAL | Age: 69
End: 2024-01-26
Payer: MEDICARE

## 2024-01-26 ENCOUNTER — HOSPITAL ENCOUNTER (OUTPATIENT)
Facility: HOSPITAL | Age: 69
Setting detail: HOSPITAL OUTPATIENT SURGERY
Discharge: HOME | End: 2024-01-26
Attending: STUDENT IN AN ORGANIZED HEALTH CARE EDUCATION/TRAINING PROGRAM | Admitting: STUDENT IN AN ORGANIZED HEALTH CARE EDUCATION/TRAINING PROGRAM
Payer: MEDICARE

## 2024-01-26 VITALS
BODY MASS INDEX: 38 KG/M2 | RESPIRATION RATE: 10 BRPM | TEMPERATURE: 98.4 F | OXYGEN SATURATION: 97 % | HEART RATE: 70 BPM | HEIGHT: 70 IN | WEIGHT: 265.43 LBS | SYSTOLIC BLOOD PRESSURE: 128 MMHG | DIASTOLIC BLOOD PRESSURE: 93 MMHG

## 2024-01-26 DIAGNOSIS — R39.9 LOWER URINARY TRACT SYMPTOMS (LUTS): ICD-10-CM

## 2024-01-26 DIAGNOSIS — N40.1 BPH WITH OBSTRUCTION/LOWER URINARY TRACT SYMPTOMS: ICD-10-CM

## 2024-01-26 DIAGNOSIS — N13.8 BPH WITH OBSTRUCTION/LOWER URINARY TRACT SYMPTOMS: ICD-10-CM

## 2024-01-26 LAB — POTASSIUM SERPL-SCNC: 4.3 MMOL/L (ref 3.5–5.2)

## 2024-01-26 PROCEDURE — 25010000002 FENTANYL CITRATE (PF) 100 MCG/2ML SOLUTION: Performed by: ANESTHESIOLOGY

## 2024-01-26 PROCEDURE — 25010000002 PROPOFOL 10 MG/ML EMULSION: Performed by: ANESTHESIOLOGY

## 2024-01-26 PROCEDURE — 52442 CYSTO INS TRNSPRSTC IMPLT EA: CPT | Performed by: STUDENT IN AN ORGANIZED HEALTH CARE EDUCATION/TRAINING PROGRAM

## 2024-01-26 PROCEDURE — C1889 IMPLANT/INSERT DEVICE, NOC: HCPCS | Performed by: STUDENT IN AN ORGANIZED HEALTH CARE EDUCATION/TRAINING PROGRAM

## 2024-01-26 PROCEDURE — 52441 CYSTO INSJ TRNSPRSTC 1 IMPLT: CPT | Performed by: STUDENT IN AN ORGANIZED HEALTH CARE EDUCATION/TRAINING PROGRAM

## 2024-01-26 PROCEDURE — 84132 ASSAY OF SERUM POTASSIUM: CPT | Performed by: ANESTHESIOLOGY

## 2024-01-26 PROCEDURE — 25010000002 CEFAZOLIN PER 500 MG: Performed by: STUDENT IN AN ORGANIZED HEALTH CARE EDUCATION/TRAINING PROGRAM

## 2024-01-26 PROCEDURE — 25810000003 LACTATED RINGERS PER 1000 ML: Performed by: ANESTHESIOLOGY

## 2024-01-26 PROCEDURE — 25010000002 DEXAMETHASONE PER 1 MG: Performed by: ANESTHESIOLOGY

## 2024-01-26 DEVICE — CARTRDG SUT UROLIFT2: Type: IMPLANTABLE DEVICE | Site: PROSTATE | Status: FUNCTIONAL

## 2024-01-26 DEVICE — DS HNDL UROLIFT2 W/SUT BX/1EA: Type: IMPLANTABLE DEVICE | Site: PROSTATE | Status: FUNCTIONAL

## 2024-01-26 RX ORDER — HYDROCODONE BITARTRATE AND ACETAMINOPHEN 5; 325 MG/1; MG/1
1 TABLET ORAL EVERY 6 HOURS PRN
Qty: 8 TABLET | Refills: 0 | Status: SHIPPED | OUTPATIENT
Start: 2024-01-26

## 2024-01-26 RX ORDER — OXYBUTYNIN CHLORIDE 10 MG/1
10 TABLET, EXTENDED RELEASE ORAL DAILY
Qty: 30 TABLET | Refills: 0 | Status: SHIPPED | OUTPATIENT
Start: 2024-01-26 | End: 2024-02-25

## 2024-01-26 RX ORDER — SODIUM CHLORIDE, SODIUM LACTATE, POTASSIUM CHLORIDE, CALCIUM CHLORIDE 600; 310; 30; 20 MG/100ML; MG/100ML; MG/100ML; MG/100ML
INJECTION, SOLUTION INTRAVENOUS CONTINUOUS PRN
Status: DISCONTINUED | OUTPATIENT
Start: 2024-01-26 | End: 2024-01-26 | Stop reason: SURG

## 2024-01-26 RX ORDER — LIDOCAINE HYDROCHLORIDE 10 MG/ML
INJECTION, SOLUTION EPIDURAL; INFILTRATION; INTRACAUDAL; PERINEURAL AS NEEDED
Status: DISCONTINUED | OUTPATIENT
Start: 2024-01-26 | End: 2024-01-26 | Stop reason: SURG

## 2024-01-26 RX ORDER — FENTANYL CITRATE 50 UG/ML
50 INJECTION, SOLUTION INTRAMUSCULAR; INTRAVENOUS
Status: DISCONTINUED | OUTPATIENT
Start: 2024-01-26 | End: 2024-01-26 | Stop reason: HOSPADM

## 2024-01-26 RX ORDER — PHENAZOPYRIDINE HYDROCHLORIDE 200 MG/1
200 TABLET, FILM COATED ORAL 3 TIMES DAILY PRN
Qty: 20 TABLET | Refills: 0 | Status: SHIPPED | OUTPATIENT
Start: 2024-01-26

## 2024-01-26 RX ORDER — FAMOTIDINE 10 MG/ML
20 INJECTION, SOLUTION INTRAVENOUS
Status: COMPLETED | OUTPATIENT
Start: 2024-01-26 | End: 2024-01-26

## 2024-01-26 RX ORDER — SODIUM CHLORIDE, SODIUM LACTATE, POTASSIUM CHLORIDE, CALCIUM CHLORIDE 600; 310; 30; 20 MG/100ML; MG/100ML; MG/100ML; MG/100ML
1000 INJECTION, SOLUTION INTRAVENOUS CONTINUOUS
Status: DISCONTINUED | OUTPATIENT
Start: 2024-01-26 | End: 2024-01-26 | Stop reason: HOSPADM

## 2024-01-26 RX ORDER — NITROFURANTOIN 25; 75 MG/1; MG/1
100 CAPSULE ORAL 2 TIMES DAILY
Qty: 14 CAPSULE | Refills: 0 | Status: SHIPPED | OUTPATIENT
Start: 2024-01-26

## 2024-01-26 RX ORDER — LIDOCAINE HYDROCHLORIDE 10 MG/ML
0.5 INJECTION, SOLUTION EPIDURAL; INFILTRATION; INTRACAUDAL; PERINEURAL ONCE AS NEEDED
Status: COMPLETED | OUTPATIENT
Start: 2024-01-26 | End: 2024-01-26

## 2024-01-26 RX ORDER — FENTANYL CITRATE 50 UG/ML
INJECTION, SOLUTION INTRAMUSCULAR; INTRAVENOUS AS NEEDED
Status: DISCONTINUED | OUTPATIENT
Start: 2024-01-26 | End: 2024-01-26 | Stop reason: SURG

## 2024-01-26 RX ORDER — NALOXONE HYDROCHLORIDE 4 MG/.1ML
SPRAY NASAL
Qty: 2 EACH | Refills: 0 | Status: SHIPPED | OUTPATIENT
Start: 2024-01-26

## 2024-01-26 RX ORDER — SODIUM CHLORIDE 0.9 % (FLUSH) 0.9 %
10 SYRINGE (ML) INJECTION AS NEEDED
Status: DISCONTINUED | OUTPATIENT
Start: 2024-01-26 | End: 2024-01-26 | Stop reason: HOSPADM

## 2024-01-26 RX ORDER — CEFAZOLIN SODIUM IN 0.9 % NACL 3 G/100 ML
3000 INTRAVENOUS SOLUTION, PIGGYBACK (ML) INTRAVENOUS ONCE
Status: COMPLETED | OUTPATIENT
Start: 2024-01-26 | End: 2024-01-26

## 2024-01-26 RX ORDER — LIDOCAINE HYDROCHLORIDE 20 MG/ML
JELLY TOPICAL AS NEEDED
Status: DISCONTINUED | OUTPATIENT
Start: 2024-01-26 | End: 2024-01-26 | Stop reason: HOSPADM

## 2024-01-26 RX ORDER — DEXAMETHASONE SODIUM PHOSPHATE 4 MG/ML
INJECTION, SOLUTION INTRA-ARTICULAR; INTRALESIONAL; INTRAMUSCULAR; INTRAVENOUS; SOFT TISSUE AS NEEDED
Status: DISCONTINUED | OUTPATIENT
Start: 2024-01-26 | End: 2024-01-26 | Stop reason: SURG

## 2024-01-26 RX ORDER — PROPOFOL 10 MG/ML
VIAL (ML) INTRAVENOUS AS NEEDED
Status: DISCONTINUED | OUTPATIENT
Start: 2024-01-26 | End: 2024-01-26 | Stop reason: SURG

## 2024-01-26 RX ORDER — FAMOTIDINE 20 MG/1
20 TABLET, FILM COATED ORAL
Status: COMPLETED | OUTPATIENT
Start: 2024-01-26 | End: 2024-01-26

## 2024-01-26 RX ORDER — HYOSCYAMINE SULFATE 0.12 MG/1
0.12 TABLET SUBLINGUAL EVERY 4 HOURS PRN
Qty: 30 EACH | Refills: 1 | Status: SHIPPED | OUTPATIENT
Start: 2024-01-26

## 2024-01-26 RX ORDER — MAGNESIUM HYDROXIDE 1200 MG/15ML
LIQUID ORAL AS NEEDED
Status: DISCONTINUED | OUTPATIENT
Start: 2024-01-26 | End: 2024-01-26 | Stop reason: HOSPADM

## 2024-01-26 RX ADMIN — LIDOCAINE HYDROCHLORIDE 50 MG: 10 INJECTION, SOLUTION EPIDURAL; INFILTRATION; INTRACAUDAL; PERINEURAL at 18:44

## 2024-01-26 RX ADMIN — SODIUM CHLORIDE, POTASSIUM CHLORIDE, SODIUM LACTATE AND CALCIUM CHLORIDE: 600; 310; 30; 20 INJECTION, SOLUTION INTRAVENOUS at 18:39

## 2024-01-26 RX ADMIN — DEXAMETHASONE SODIUM PHOSPHATE 4 MG: 4 INJECTION, SOLUTION INTRAMUSCULAR; INTRAVENOUS at 18:48

## 2024-01-26 RX ADMIN — LIDOCAINE HYDROCHLORIDE 0.5 ML: 10 INJECTION, SOLUTION EPIDURAL; INFILTRATION; INTRACAUDAL; PERINEURAL at 16:22

## 2024-01-26 RX ADMIN — SODIUM CHLORIDE, POTASSIUM CHLORIDE, SODIUM LACTATE AND CALCIUM CHLORIDE 1000 ML: 600; 310; 30; 20 INJECTION, SOLUTION INTRAVENOUS at 16:23

## 2024-01-26 RX ADMIN — FENTANYL CITRATE 50 MCG: 50 INJECTION, SOLUTION INTRAMUSCULAR; INTRAVENOUS at 18:47

## 2024-01-26 RX ADMIN — PROPOFOL 200 MG: 10 INJECTION, EMULSION INTRAVENOUS at 18:44

## 2024-01-26 RX ADMIN — FENTANYL CITRATE 50 MCG: 50 INJECTION, SOLUTION INTRAMUSCULAR; INTRAVENOUS at 18:51

## 2024-01-26 RX ADMIN — FAMOTIDINE 20 MG: 20 TABLET, FILM COATED ORAL at 16:22

## 2024-01-26 RX ADMIN — CEFAZOLIN 3000 MG: 10 INJECTION, POWDER, FOR SOLUTION INTRAVENOUS at 18:40

## 2024-01-26 NOTE — ANESTHESIA PROCEDURE NOTES
Airway  Urgency: elective    Date/Time: 1/26/2024 6:45 PM  Airway not difficult    General Information and Staff    Patient location during procedure: OR  Anesthesiologist: Cole Palencia MD    Indications and Patient Condition  Indications for airway management: airway protection    Preoxygenated: yes  Mask difficulty assessment: 1 - vent by mask    Final Airway Details  Final airway type: supraglottic airway      Successful airway: I-gel  Size 4     Number of attempts at approach: 1  Assessment: lips, teeth, and gum same as pre-op    Additional Comments  LMA placed without difficulty, ventilation with assist, equal breath sounds and symmetric chest rise and fall

## 2024-01-26 NOTE — H&P
[facility]   HISTORY AND PHYSICAL    Patient Name: Cecilio Fierro  : 1955  MRN: [id number]  Primary Care Physician:  Praveen Odell MD  Date of admission: 2024    Subjective   Subjective     Chief Complaint: Prostate cancer, lower inner tract symptoms secondary to BPH    HPI:    Cecilio Fierro is a 68 y.o. male who has a history of high-volume grade group 2 prostate cancer, in addition he has severe lower urinary tract symptoms.  He has recurrent UTIs.  He reports weak stream.  He elects to proceed to the operating room today for cystoscopy with UroLift implant.    Review of Systems   All systems were reviewed and negative except for: None    Personal History     Past Medical History:   Diagnosis Date    Anxiety     Anxiety and depression 2017    Arthritis     Bleeds easily     per pt and wife     Bursitis     left hip     CAD (coronary artery disease) 2017    5 stents in heart     Chronic kidney failure     sees Dr Hicks every 6 months    Depression     Diverticulosis 2017    Dyslipidemia 2017    Emphysema of lung     GERD (gastroesophageal reflux disease)     Gout     Hemorrhoid     needs surgery     Hyperlipidemia     Hypertension 2017    Low back pain     needs another back surgery     Neuropathy 2017    Pacemaker at end of battery life     Palpitations     worsening since 2017    Prostate cancer     Restless leg syndrome     Sleep apnea with use of continuous positive airway pressure (CPAP)     Symptomatic bradycardia 2017    TIA (transient ischemic attack)     Questionable TIA vs. seizure, 2014. Negative head CT and carotid duplex.Residual left facial droop and speech impairment, 2014.     Tobacco use 2017    Wears dentures     upper only     Wears glasses        Past Surgical History:   Procedure Laterality Date    ADRENAL GLAND SURGERY Right 2019    BACK SURGERY      lumbar x stop placed    CARDIAC CATHETERIZATION   02/25/2015    Dr. Huizar- Non Obstructive disease    CARDIAC ELECTROPHYSIOLOGY PROCEDURE N/A 02/21/2018    PPM battery change- Dr.Sandeep Latham    CARDIOVASCULAR STRESS TEST  12/30/2021    Lexiscan- EF 50%. Negative. Poor Study    CHOLECYSTECTOMY      COLONOSCOPY      CORONARY ANGIOPLASTY WITH STENT PLACEMENT      x5    ECHO - CONVERTED  04/18/2018    -= EF 55%.Trace MR    ECHO - CONVERTED  12/30/2021    EF 65%. LA- 4.8 Cm. Trace-Mild MR & AI. AO- 4.2 Cm    ENDOSCOPY      HEMORRHOIDECTOMY      INSERT / REPLACE / REMOVE PACEMAKER  2003    pacemaker originally inserted in 2003 by Dr Chawla     JOINT REPLACEMENT Bilateral     knees    LIPOMA RESECTION      Lipoma resection from abdominal wall.     OTHER SURGICAL HISTORY      Bullet removal. total of 7 entered left shoulder removed from chest abd and buttock ( expl Lap )    PACEMAKER IMPLANTATION  10/2003    Generator change 02/14/2007, St. Prakash Victory XL. -Nicole     PET MULTI STUDY REST STRESS  01/26/2015    - EF 55%. No Ischemia       Family History: family history includes Alzheimer's disease in an other family member; Brain cancer in his brother and sister; Cancer in his father and another family member; Colon cancer in his brother; Diabetes in an other family member; Heart disease in an other family member; Hypertension in an other family member; Lung cancer in his father; Prostate cancer in his brother; Stroke in an other family member. Otherwise pertinent FHx was reviewed and not pertinent to current issue.    Social History:  reports that he has been smoking cigarettes. He has a 26.00 pack-year smoking history. He has been exposed to tobacco smoke. He has quit using smokeless tobacco.  His smokeless tobacco use included chew. He reports that he does not drink alcohol and does not use drugs.    Home Medications:  ALPRAZolam XR, DULoxetine, Fluticasone-Salmeterol, PE-Triprolidine-DM-GG-APAP, allopurinol, aspirin, bumetanide, doxepin,  fexofenadine, fluticasone, folic acid, gabapentin, methocarbamol, metoprolol tartrate, montelukast, nitroglycerin, omeprazole, pravastatin, promethazine, rOPINIRole, tamsulosin, and traZODone      Allergies:  Allergies   Allergen Reactions    Morphine Itching, Other (See Comments), Unknown (See Comments) and Unknown - Low Severity     urinary retention    urinary retention   urinary retention    Other reaction(s): Unknown   urinary retention   urinary retention   urinary retention   urinary retention    Ondansetron Itching, Unknown (See Comments), Unknown - Low Severity and Rash     Caused nausea and itching.     Caused nausea and itching.    Caused nausea and itching.    Levaquin [Levofloxacin] Hives    Tramadol Rash    Zofran [Ondansetron Hcl] Other (See Comments)     Caused nausea and itching.        Objective   Objective     Vitals:   Temp:  [97.1 °F (36.2 °C)] 97.1 °F (36.2 °C)  Heart Rate:  [70] 70  Resp:  [18] 18  BP: (117)/(89) 117/89  Physical Exam    Constitutional: Awake in bed, alert    Eyes: PERRLA, sclerae anicteric, no conjunctival injection   HENT: Normocephalic, atraumatic, mucous membranes moist   Neck: Supple, trachea midline   Respiratory:Equal chest rise, non-labored respirations    Cardiovascular: RRR, palpable radial pulses bilaterally   Gastrointestinal: Soft, nontender, non-distended, no flank pain to palpation    Musculoskeletal: No bilateral ankle edema, no clubbing or cyanosis to extremities   Psychiatric: Appropriate affect, cooperative   Neurologic: Oriented x 3, Cranial Nerves grossly intact, speech clear   Skin: No rashes     Result Review    Result Review:  I have personally reviewed the results from the time of this admission to 1/26/2024 18:16 EST and agree with these findings:  []  Laboratory  []  Microbiology  []  Radiology  []  EKG/Telemetry   []  Cardiology/Vascular   []  Pathology  []  Old records  []  Other:    Most notable findings include:     Assessment & Plan   Assessment  / Plan     Brief Patient Summary:  Cecilio Fierro is a 68 y.o. male who has significant BPH with weak stream, urgency and frequency despite alfuzosin.  Is here for cystoscopy with UroLift.  Risks discussed.  Informed consent obtained.    Active Hospital Problems:  Active Hospital Problems    Diagnosis     BPH with obstruction/lower urinary tract symptoms     Lower urinary tract symptoms (LUTS)      Plan:   -UroLift today  -Ancef preop  -I have asked patient to remove his Mercer catheter tomorrow at home, we will have nursing teach him how in recovery  -Follow-up in 2 weeks, we will arrange follow-up with Dr. Lockhart radiation oncologist for prostate cancer treatment as planned      DVT prophylaxis:  No DVT prophylaxis order currently exists.        CODE STATUS:       Admission Status:  I believe this patient meets discharge post op status.    Electronically signed by Malcolm Barber MD, 01/26/24, 6:16 PM EST.

## 2024-01-27 NOTE — OP NOTE
PROSTATIC URETHRAL LIFT  Procedure Report    Patient Name:  Cecliio Fierro  YOB: 1955    Date of Surgery:  1/26/2024     Indications: 68-year-old gentleman with high-volume grade group 2 prostate cancer in the setting of severe lower urinary tract symptoms, who has elected for primary XRT as management for his prostate cancer, presents today for cystoscopy with UroLift placement to alleviate BPH symptoms prior to planned radiation therapy.  Risks discussed.  Informed consent obtained.    Pre-op Diagnosis:   BPH with obstruction/lower urinary tract symptoms [N40.1, N13.8]  Lower urinary tract symptoms (LUTS) [R39.9]       Post-Op Diagnosis Codes:     * BPH with obstruction/lower urinary tract symptoms [N40.1, N13.8]     * Lower urinary tract symptoms (LUTS) [R39.9]      Procedure(s):  CYSTOSCOPY, UROLIFT PLACEMENT x 5 IMPLANTS  LEONARD CATHETER PLACEMENT     Procedure CPT:  52441 x 1  52442 x 4      Staff:  Surgeon(s):  Malcolm Barber MD      Anesthesia: General    Estimated Blood Loss: minimal    Implants:    Implant Name Type Inv. Item Serial No.  Lot No. LRB No. Used Action   CARTRDG SUT UROLIFT2 - [id number] Implant CARTRDG SUT UROLIFT2  NEOTRACT [id number] N/A 5 Implanted       Specimen:          None        Findings: Severe lateral lobe hyperplasia with urethral coaptation of the midline.  Uncomplicated placement of 5X UroLift implants with widely patent prostatic fossa and bladder neck at conclusion of procedure.  18 Macedonian coudé Leonard catheter placed, 10 cc in balloon.    Complications: None    Description of Procedure:    The patient was identified in the preoperative waiting area where informed consent was reviewed and signed.  He was transferred back to the OR, placed supine.  Preoperative antibiotics were administered, a timeout was performed.  The patient was anesthetized with monitored anesthesia care.  He was placed in dorsolithotomy.  Genitals were prepped and draped  in sterile fashion.     I began the procedure by inserting a 20 [place]ian cystoscope atraumatically per the urethra and into the bladder.  Patient has significant lateral lobe hyperplasia, no significant median lobe was noted.  The bladder was evaluated and appeared normal, he had bilateral orthotopic ureteral orifices well away from the bladder neck.  The first treatment site was the patient's right side approximately 1.5 cm distal to the bladder neck.  The distal tip of the delivery device was angled laterally approximately 20 degrees at this position to compress the lateral lobe.  The trigger was pulled thereby deploying a needle containing the implant through the prostate.  The needle was retracted allowing 1 end of the implant to be delivered to the capsular surface of the prostate.  The implant was then tensioned to assure capsular seating and removal of slack monofilament.  The device was then angled back toward midline and slowly advanced proximally until cystoscopic verification of the monofilament being centered in the delivery bay.  The urethral end piece was then affixed to the monofilament thereby tailoring the size of the implant.  Excess filament was then severed.  The delivery device was then readvanced into the bladder.  The delivery device was then replaced with the cystoscope and bridge and the implant location opening affect was confirmed cystoscopically.  The same procedure was then repeated on the right side and 2 additional implants were delivered just proximal to the verumontanum again on the right and 1 on the left side of the prostate following the same technique.      After 4 implant placement, there was residual obstructing tissue noted at left apex, requiring additional implants x1.      Cystoscopy then revealed a widely patent prostatic fossa, bladder neck outlet is easily visible with cystoscope seated at the verumontanum.  No additional implants were deemed necessary.      The bladder  was then filled with 200 cc of sterile water and the scope was removed.  There was mild prostatic oozing. Decision made to place a catheter overnight for prostatic tamponade. 10 cc used to fill the catheter balloon.        PLAN  - Patient will discharge home tonight.  He has a significant 2-hour drive time.  I recommend maintain Mercer catheter overnight with plans to remove the catheter by the patient and his wife at home, instructions were provided and 10 cc syringe provided for him to remove his Mercer.  Return precautions for hematuria, retention etc. were discussed at length.  He will go home with antispasmodics, narcotic pain medication in case of severe pain and Macrobid x 1 week.  I will see him in 2 weeks for follow-up and to help him arrange further XRT planning with radiation oncology.      Malcolm Barber MD     Date: 1/27/2024  Time: 10:23 EST

## 2024-01-27 NOTE — BRIEF OP NOTE
PROSTATIC URETHRAL LIFT  Progress Note    Cecilio Fierro  1/26/2024    Pre-op Diagnosis:   BPH with obstruction/lower urinary tract symptoms [N40.1, N13.8]  Lower urinary tract symptoms (LUTS) [R39.9]       Post-Op Diagnosis Codes:     * BPH with obstruction/lower urinary tract symptoms [N40.1, N13.8]     * Lower urinary tract symptoms (LUTS) [R39.9]         Procedure(s):  CYSTOSCOPY, UROLIFT PLACEMENT x 5 IMPLANTS  LEONARD CATHETER PLACEMENT      Surgeon(s):  Malcolm Barber MD    Anesthesia: General    Staff:   Circulator: Beba Leach RN; Evangelista Cheung RN  Scrub Person: Olivia Luque         Estimated Blood Loss: minimal    Urine Voided: * No values recorded between 1/26/2024  6:39 PM and 1/26/2024  7:02 PM *    Specimens:                None          Drains:   Urethral Catheter Coude 18 Fr. (Active)       Findings: Significant lateral lobe hyperplasia, uncomplicated Urolift x 5 implants. 18 Fr leonard.         Complications: None          Malcolm Barber MD     Date: 1/26/2024  Time: 19:09 EST

## 2024-01-27 NOTE — ANESTHESIA POSTPROCEDURE EVALUATION
Patient: Cecilio Fierro    Procedure Summary       Date: 01/26/24 Room / Location:  [facility]    Anesthesia Start: 1839 Anesthesia Stop: 1920    Procedure: CYSTOSCOPY, UROLIFT PLACEMENT Diagnosis:       BPH with obstruction/lower urinary tract symptoms      Lower urinary tract symptoms (LUTS)      (BPH with obstruction/lower urinary tract symptoms [N40.1, N13.8])      (Lower urinary tract symptoms (LUTS) [R39.9])    Surgeons: Malcolm Barber MD Provider: Cole Palencia MD    Anesthesia Type: general ASA Status: 4            Anesthesia Type: general    Vitals  No vitals data found for the desired time range.          Post Anesthesia Care and Evaluation    Patient location during evaluation: PACU  Patient participation: complete - patient participated  Level of consciousness: awake and alert  Pain management: adequate    Airway patency: patent  Anesthetic complications: No anesthetic complications  PONV Status: none  Cardiovascular status: hemodynamically stable and acceptable  Respiratory status: nonlabored ventilation, acceptable and nasal cannula  Hydration status: acceptable

## 2024-01-27 NOTE — DISCHARGE INSTRUCTIONS
UROLIFT - Post-Operative Instructions    Please follow these guidelines after your procedure in order to assist with your recovery.     Anesthesia Precautions and Expectations  - Rest for 24 hours after receiving general anesthesia, make sure you have someone at home with you that can monitor you  - Do not operate a vehicle, drink alcohol, or make 'important decisions'/sign legal documentation during the immediate recovery period if you received sedation for your procedure  - You may experience a sore throat, jaw discomfort, or muscle aches related to anesthesia, these symptoms may last a few days    Activity  - Light activity is encouraged for 1 week to prevent urinary bleeding  - Avoid lifting heavy objects more than 20 lbs, running, or endurance exercise for 1 week   - Do not operate a vehicle or drink alcohol if you were prescribed narcotic pain medications     Bathing/Showering  - You may resume normal bathing and showering post-procedure    Pain/Urinary Symptoms  - You may experience burning urinary pain for a few days, and/or increased urinary urgency/frequency post-procedure for a few weeks which is expected  - A medication to treat burning urinary pain (Phenazopyridine) may be prescribed by your doctor, take as directed  - A medication to prevent urinary urgency/frequency (sometimes referred to as “bladder spasms”) (Hyoscyamine, Oxybutynin, Mirabegron, Solifenacin, etc.) may be prescribed by your doctor for up to 1 month, take as directed     Urinary Bleeding (Hematuria)   - Some degree of light urinary bleeding (hematuria) is expected for up to 1-2 weeks (this may be as light as pink lemonade or somewhat darker like clear/pale red Gatorade); a good rule of thumb is that your urine should remain see-through    - If you experience heavy urinary bleeding (like the color and consistency of tomato juice, or red wine), large blood clots, or you are unable to urinate for more than 8 hours you should contact your  doctor and present to the nearest Emergency Department  - Drink plenty of water at home and stay hydrated, as this will help naturally flush out your bladder and urethra    Sexual Activity  - Refrain from sexual activity and ejaculation for 1 week while you are healing which may help prevent pain and bleeding    Mercer Catheter  IF YOUR CATHETER WAS LEFT IN PLACE TO PROMOTE HEALING, YOUR SURGEON WILL ARRANGE FOLLOW UP FOR CATHETER REMOVAL IN THE UROLOGY CLINIC AND YOU WILL BE CONTACTED    - You may be sent home with a urethral catheter sometimes for up to 1 week post-procedure; catheter specific instructions are as follows:   - Do not attempt to remove your urinary catheter (unless explicitly instructed by your doctor with at home catheter removal instructions/equipment)  - If you feel that your catheter is not working the right way, call your doctor   - Keep your skin and catheter clean, clean the skin around your catheter at least two times each day, clean your skin  and catheter after every bowel movement  - Always keep your urine collection bag below the level of your bladder; keeping the bag below your bladder will help keep your urine from flowing back into your bladder from urine collection bag, which may cause infection     - Drink plenty of liquids, at least 6-8 glasses of healthy liquids or water each day which will help flush out your bladder  - Do not attempt sexual intercourse while you have a catheter in place  - Do not tug or pull on your catheter tubing. This can cause you to bleed and hurt your urethra. Do not step on the tubing when you walk. Always keep the catheter secured to your inner leg with either leg-tape provided, the special catheter sticker/securing device, or leg strap         Please contact your physician if you are experiencing the following symptoms after your procedure:    Fever >100 Farenheit, chills, nausea, vomiting, severe abdominal or chest pain, severe urinary bleeding (darker  than red fruit punch), urinary clots, catheter stops draining, or unable to urinate for more than 8 hours.     Please call the [facility] mainline at [phone], the Urology office at [phone], or present to your nearest Emergency Department if you have ongoing concerns mentioned above.

## 2024-01-29 NOTE — TELEPHONE ENCOUNTER
Wife called requesting refill on medication. Patient has had surgery due to cancer had to reschedule appointment and will be starting chemotherapy. Requesting refill till appointment scheduled 2/29/24

## 2024-02-15 ENCOUNTER — OFFICE VISIT (OUTPATIENT)
Dept: UROLOGY | Facility: CLINIC | Age: 69
End: 2024-02-15
Payer: MEDICARE

## 2024-02-15 VITALS
WEIGHT: 265 LBS | BODY MASS INDEX: 37.94 KG/M2 | HEIGHT: 70 IN | DIASTOLIC BLOOD PRESSURE: 82 MMHG | HEART RATE: 77 BPM | SYSTOLIC BLOOD PRESSURE: 136 MMHG

## 2024-02-15 DIAGNOSIS — C61 PROSTATE CANCER: Primary | ICD-10-CM

## 2024-02-15 DIAGNOSIS — N40.1 BPH WITH OBSTRUCTION/LOWER URINARY TRACT SYMPTOMS: ICD-10-CM

## 2024-02-15 DIAGNOSIS — R39.9 LOWER URINARY TRACT SYMPTOMS (LUTS): ICD-10-CM

## 2024-02-15 DIAGNOSIS — N13.8 BPH WITH OBSTRUCTION/LOWER URINARY TRACT SYMPTOMS: ICD-10-CM

## 2024-02-15 LAB
BILIRUB BLD-MCNC: NEGATIVE MG/DL
CLARITY, POC: CLEAR
COLOR UR: YELLOW
EXPIRATION DATE: NORMAL
GLUCOSE UR STRIP-MCNC: NEGATIVE MG/DL
KETONES UR QL: NEGATIVE
LEUKOCYTE EST, POC: NEGATIVE
Lab: NORMAL
NITRITE UR-MCNC: NEGATIVE MG/ML
PH UR: 5.5 [PH] (ref 5–8)
PROT UR STRIP-MCNC: NEGATIVE MG/DL
RBC # UR STRIP: NEGATIVE /UL
SP GR UR: 1.01 (ref 1–1.03)
UROBILINOGEN UR QL: NORMAL

## 2024-02-15 RX ORDER — MAGNESIUM HYDROXIDE 1200 MG/15ML
1 LIQUID ORAL ONCE
Qty: 1 ENEMA | Refills: 0 | Status: SHIPPED | OUTPATIENT
Start: 2024-02-15 | End: 2024-02-15

## 2024-02-15 RX ORDER — SULFAMETHOXAZOLE AND TRIMETHOPRIM 800; 160 MG/1; MG/1
1 TABLET ORAL 2 TIMES DAILY
Qty: 6 TABLET | Refills: 0 | Status: SHIPPED | OUTPATIENT
Start: 2024-02-18 | End: 2024-02-21

## 2024-02-19 ENCOUNTER — TELEPHONE (OUTPATIENT)
Dept: RADIATION ONCOLOGY | Facility: HOSPITAL | Age: 69
End: 2024-02-19
Payer: MEDICARE

## 2024-02-19 DIAGNOSIS — C61 PROSTATE CANCER: Primary | ICD-10-CM

## 2024-02-19 NOTE — TELEPHONE ENCOUNTER
Mrs. Fierro notified of the following upcomming appointments:  Please start your gas reducing diet on 3/2/24 along with GasX with meals and at bedtime.  3/4/24 you have an appointment with Dr. Lockhart on 3/4/24 at 1430, please do not eat or drink six hours prior to this appointment and you will need to do an enema before arrival.

## 2024-02-20 ENCOUNTER — PROCEDURE VISIT (OUTPATIENT)
Dept: UROLOGY | Facility: CLINIC | Age: 69
End: 2024-02-20
Payer: MEDICARE

## 2024-02-20 VITALS
OXYGEN SATURATION: 94 % | HEART RATE: 78 BPM | DIASTOLIC BLOOD PRESSURE: 74 MMHG | HEIGHT: 70 IN | WEIGHT: 265 LBS | BODY MASS INDEX: 37.94 KG/M2 | SYSTOLIC BLOOD PRESSURE: 118 MMHG

## 2024-02-20 DIAGNOSIS — C61 PROSTATE CANCER: Primary | ICD-10-CM

## 2024-02-20 NOTE — PROGRESS NOTES
Preprocedure diagnosis  Prostate Cancer     Postprocedure diagnosis  Prostate Cancer     Procedure  1. Transrectal Ultrasound Guided Placement of Gold Fiducial Markers  2. Total Number of Markers Placed: 5    Attending surgeon  Malcolm Barber MD    Anesthesia  1% lidocaine injected anshu-prostatic      Complications  None    Indications  69 y.o. male with a history of high volume prostate cancer, who has elected to proceed with external beam radiation therapy.  He presents today for placement of gold fiducial marker to guide radiation therapy planning.  Informed consent was reviewed and signed after discussion of risks, benefits, alternatives.    Findings  Uncomplicated placement of 5 gold fiducial markers, placement performed at left and right lateral base, left and right lateral apex, and 1 markers placed in a medial location at the left midportion of the prostate.     Procedure  The patient was identified, informed consent was reviewed and signed.  I confirmed with the patient that he has been taking his preprocedure antibiotics appropriately.  He was placed in the left lateral position, with knees to chest.  The ultrasound probe was inserted into the patient's rectum.  The prostate was identified.  5 mL of 1% lidocaine was injected along the neurovascular bundle on the right side.  I then performed the same anesthetic injection on the left side.      Next starting at the base of the prostate on the patient's left side, I placed one gold marker in a lateral position.  I then moved to the left apex and placed another gold marker in a lateral position.  I then switched to the right side and performed the identical procedure, placing a gold marker on the right lateral position, and then at the apex in a right lateral position.  I moved to the mid prostate and placed a medial marker at the left mid prostate.  A total of 5 gold markers were used.  No significant bleeding was encountered.  The rectal probe was held  in place for 3 minutes to confirm hemostasis.  The rectal probe was removed and there was no significant blood per rectum noted.  The patient tolerated the procedure well denies significant pain or discomfort.    Follow Up:   -F/u with Radiation Oncology as planned for radiation therapy planning  -Return to my clinic in 4-5 months with a repeat PSA prior and for continued prostate cancer follow-up  -Return precautions discussed, including significant hematuria, significant blood per rectum, fevers, chills, nausea, vomiting. Patient was instructed to call my office or present to the nearest emergency department with these concerns.    Malcolm Barber MD

## 2024-02-20 NOTE — PATIENT INSTRUCTIONS
PROSTATE FIDUCIAL MARKERS Post-Procedure Care/Expectations     Follow these guidelines after your procedure in order to assist with your recovery.    Activity  - Limit yourself to light activity only for 4-5 days after your procedure to prevent rectal and urinary bleeding  - You may return to work in 24 hours     Bleeding  - It is common to notice bleeding in the urine, in the semen, and in the stool after your prostate marker procedure  - Urinary bleeding usually stops within a week and is typically light  - Rectal bleeding or blood in the stool can persist for up to 1 week; if you experience very heavy rectal bleeding with large blood clots, or become light headed, present to the nearest emergency department and call your urologist  - Blood in the semen (red discoloration or “margarito” discoloration) can persist for up to 6 weeks and this is not inherently harmful to you or your partner     Urination  - You may experience a few days of urinary urgency and frequency after your biopsy which is expected  - Drink plenty of fluid to flush out your bladder and urethra   - If you are unable to urinate for more than 8 hours after your procedure, you may be experiencing urinary retention related to prostatic swelling, and should contact your urologist or present to the nearest emergency department for evaluation and possible urethral catheter placement     Bathing/Showering  - You may resume normal showering and bathing after your procedure     Pain Control  - You will likely have some rectal or pelvic discomfort after your procedure, but this is not typically severe, and very rarely requires the use of narcotics for pain control   - If you experience rectal or pelvic discomfort post-procedure, you should use over the counter Tylenol (Acetaminophen) or Advil/Motrin (Ibuprofen)     Sexual Activity  - Refrain from sexual activity and ejaculation for at least 1 week post-procedure to prevent bleeding and possibly pain    When to  call your doctor:     - ANY fever after prostate marker procedure is ALWAYS considered significant and should be reported to your urologist right away as this can indicate the possibility of sepsis (bacteria in blood stream) which can be life threatening; the risk of post-prostate biopsy sepsis is less than 4%     - If you experience any of the following symptoms while at home, call your urologist and make plans to present to the nearest emergency department:     - Fever >100 F, shaking chills, nausea or vomiting, profuse sweating   - If you are unable to urinate for more than 8 hours after your biopsy, call your urologist and present to the nearest emergency department for evaluation

## 2024-02-21 ENCOUNTER — TELEPHONE (OUTPATIENT)
Dept: UROLOGY | Facility: CLINIC | Age: 69
End: 2024-02-21
Payer: MEDICARE

## 2024-02-21 DIAGNOSIS — R10.2 PELVIC PAIN: Primary | ICD-10-CM

## 2024-02-21 RX ORDER — OXYCODONE HYDROCHLORIDE AND ACETAMINOPHEN 5; 325 MG/1; MG/1
1 TABLET ORAL EVERY 6 HOURS PRN
Qty: 12 TABLET | Refills: 0 | Status: SHIPPED | OUTPATIENT
Start: 2024-02-21

## 2024-02-21 NOTE — TELEPHONE ENCOUNTER
Patient had fiducial markers placed yesterday.    Patient states he is in allot of pain - pain level 10.  The pain is by his rectum, by testicles.    Patient states the pain is worse when he walks and sits down.       Patient asking if Dr. Barber can send pain medication.       Patient denies any fever, chills or problems voiding.     Dr. Barber, please advise.  Thank you

## 2024-02-29 ENCOUNTER — DOCUMENTATION (OUTPATIENT)
Dept: NUTRITION | Facility: HOSPITAL | Age: 69
End: 2024-02-29
Payer: MEDICARE

## 2024-02-29 ENCOUNTER — OFFICE VISIT (OUTPATIENT)
Dept: PSYCHIATRY | Facility: CLINIC | Age: 69
End: 2024-02-29
Payer: MEDICARE

## 2024-02-29 VITALS
BODY MASS INDEX: 40.37 KG/M2 | HEIGHT: 70 IN | HEART RATE: 78 BPM | SYSTOLIC BLOOD PRESSURE: 140 MMHG | DIASTOLIC BLOOD PRESSURE: 76 MMHG | OXYGEN SATURATION: 96 % | WEIGHT: 282 LBS

## 2024-02-29 DIAGNOSIS — F33.40 RECURRENT MAJOR DEPRESSIVE DISORDER, IN REMISSION: Primary | ICD-10-CM

## 2024-02-29 NOTE — PROGRESS NOTES
[facility] Nutrition    Attempted to reach patient via phone to review the Gas Elimination Diet and instructions in preparation for the imaging scans and CK treatment for prostate cancer.  No answer; VM left requesting return phone call.    3/1/24 - 11:05 am  Phone consult with patient regarding the Gas Elimination Diet and instructions in preparation for the imaging scans and CK treatment for prostate cancer.  Reviewed the rationale for the diet modification, gas forming foods, schedule for following, Gas X, enemas, NPO status x 6 hours prior to MRI and appointment times.  Patient verbalized excellent comprehension of diet; all questions were addressed.
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
## 2024-03-04 ENCOUNTER — HOSPITAL ENCOUNTER (OUTPATIENT)
Dept: RADIATION ONCOLOGY | Facility: HOSPITAL | Age: 69
Setting detail: RADIATION/ONCOLOGY SERIES
End: 2024-03-04
Payer: MEDICARE

## 2024-03-04 ENCOUNTER — OFFICE VISIT (OUTPATIENT)
Dept: RADIATION ONCOLOGY | Facility: HOSPITAL | Age: 69
End: 2024-03-04
Payer: MEDICARE

## 2024-03-04 VITALS
SYSTOLIC BLOOD PRESSURE: 140 MMHG | RESPIRATION RATE: 16 BRPM | DIASTOLIC BLOOD PRESSURE: 81 MMHG | TEMPERATURE: 98.5 F | OXYGEN SATURATION: 94 % | HEART RATE: 77 BPM | BODY MASS INDEX: 39.26 KG/M2 | WEIGHT: 273.6 LBS

## 2024-03-04 DIAGNOSIS — R39.9 LOWER URINARY TRACT SYMPTOMS (LUTS): ICD-10-CM

## 2024-03-04 DIAGNOSIS — N13.8 BPH WITH OBSTRUCTION/LOWER URINARY TRACT SYMPTOMS: ICD-10-CM

## 2024-03-04 DIAGNOSIS — N40.1 BPH WITH OBSTRUCTION/LOWER URINARY TRACT SYMPTOMS: ICD-10-CM

## 2024-03-04 DIAGNOSIS — C61 PROSTATE CANCER: Primary | ICD-10-CM

## 2024-03-04 PROCEDURE — G0463 HOSPITAL OUTPT CLINIC VISIT: HCPCS
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
## 2024-04-02 DIAGNOSIS — C61 PROSTATE CANCER: Primary | ICD-10-CM

## 2024-04-04 ENCOUNTER — HOSPITAL ENCOUNTER (OUTPATIENT)
Dept: RADIATION ONCOLOGY | Facility: HOSPITAL | Age: 69
Setting detail: RADIATION/ONCOLOGY SERIES
Discharge: HOME | End: 2024-04-04
Payer: MEDICARE

## 2024-04-04 ENCOUNTER — OFFICE VISIT (OUTPATIENT)
Dept: RADIATION ONCOLOGY | Facility: HOSPITAL | Age: 69
End: 2024-04-04
Payer: MEDICARE

## 2024-04-04 ENCOUNTER — HOSPITAL ENCOUNTER (OUTPATIENT)
Dept: RADIATION ONCOLOGY | Facility: HOSPITAL | Age: 69
Discharge: HOME | End: 2024-04-04

## 2024-04-04 ENCOUNTER — APPOINTMENT (OUTPATIENT)
Dept: RADIATION ONCOLOGY | Facility: HOSPITAL | Age: 69
End: 2024-04-04
Payer: MEDICARE

## 2024-04-04 VITALS
WEIGHT: 278.1 LBS | OXYGEN SATURATION: 94 % | BODY MASS INDEX: 39.9 KG/M2 | TEMPERATURE: 98 F | RESPIRATION RATE: 16 BRPM | HEART RATE: 71 BPM | SYSTOLIC BLOOD PRESSURE: 143 MMHG | DIASTOLIC BLOOD PRESSURE: 83 MMHG

## 2024-04-04 DIAGNOSIS — C61 PROSTATE CANCER: Primary | ICD-10-CM

## 2024-04-04 LAB — CREAT BLDA-MCNC: 1.7 MG/DL (ref 0.6–1.3)

## 2024-04-04 PROCEDURE — 82565 ASSAY OF CREATININE: CPT

## 2024-04-04 PROCEDURE — G0463 HOSPITAL OUTPT CLINIC VISIT: HCPCS
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
## 2024-04-09 PROCEDURE — 77301 RADIOTHERAPY DOSE PLAN IMRT: CPT | Performed by: RADIOLOGY

## 2024-04-09 PROCEDURE — 77300 RADIATION THERAPY DOSE PLAN: CPT | Performed by: RADIOLOGY

## 2024-04-09 PROCEDURE — 77338 DESIGN MLC DEVICE FOR IMRT: CPT | Performed by: RADIOLOGY

## 2024-04-12 ENCOUNTER — DOCUMENTATION (OUTPATIENT)
Dept: OTHER | Facility: HOSPITAL | Age: 69
End: 2024-04-12
Payer: MEDICARE

## 2024-04-12 NOTE — SIGNIFICANT NOTE
SW contacted pt's wife per request of radiation team to assist with transportation needs. Pt's wife communicated they plan to drive for tx, and transportation assistance would be helpful. SW will submit [place] [facility] application on this date, and provided education on gas card availability. OCTAVIANO asked that pt's wife contact on 4/15 and SW will meet with them in radiation. PT's wife was agreeable to plan and thanked OCTAVIANO. SW will be available ongoing.       04/12/24 0900   Oncology Interventions   Transportation Needs gas cards  ([place] [facility] referral)

## 2024-04-15 ENCOUNTER — HOSPITAL ENCOUNTER (OUTPATIENT)
Dept: RADIATION ONCOLOGY | Facility: HOSPITAL | Age: 69
Discharge: HOME | End: 2024-04-15
Payer: MEDICARE

## 2024-04-15 PROCEDURE — 77373 STRTCTC BDY RAD THER TX DLVR: CPT | Performed by: RADIOLOGY

## 2024-04-17 ENCOUNTER — HOSPITAL ENCOUNTER (OUTPATIENT)
Dept: RADIATION ONCOLOGY | Facility: HOSPITAL | Age: 69
Discharge: HOME | End: 2024-04-17

## 2024-04-17 ENCOUNTER — DOCUMENTATION (OUTPATIENT)
Dept: OTHER | Facility: HOSPITAL | Age: 69
End: 2024-04-17
Payer: MEDICARE

## 2024-04-17 PROCEDURE — 77373 STRTCTC BDY RAD THER TX DLVR: CPT | Performed by: RADIOLOGY

## 2024-04-17 NOTE — SIGNIFICANT NOTE
OCTAVIANO provided radiation medicine with $20 in [employer] gas cards per request of pt from previous conversation. SW will be available should other needs or concerns arise.       04/17/24 1300   Oncology Interventions   Transportation Needs gas cards  (provided $20 in  [employer] cards)

## 2024-04-19 ENCOUNTER — HOSPITAL ENCOUNTER (OUTPATIENT)
Dept: RADIATION ONCOLOGY | Facility: HOSPITAL | Age: 69
Discharge: HOME | End: 2024-04-19

## 2024-04-19 ENCOUNTER — PATIENT OUTREACH (OUTPATIENT)
Dept: OTHER | Facility: HOSPITAL | Age: 69
End: 2024-04-19
Payer: MEDICARE

## 2024-04-19 PROCEDURE — 77373 STRTCTC BDY RAD THER TX DLVR: CPT | Performed by: RADIOLOGY

## 2024-04-24 ENCOUNTER — DOCUMENTATION (OUTPATIENT)
Dept: OTHER | Facility: HOSPITAL | Age: 69
End: 2024-04-24
Payer: MEDICARE

## 2024-04-24 ENCOUNTER — HOSPITAL ENCOUNTER (OUTPATIENT)
Dept: RADIATION ONCOLOGY | Facility: HOSPITAL | Age: 69
Setting detail: RADIATION/ONCOLOGY SERIES
Discharge: HOME | End: 2024-04-24
Payer: MEDICARE

## 2024-04-24 PROCEDURE — 77373 STRTCTC BDY RAD THER TX DLVR: CPT | Performed by: RADIOLOGY

## 2024-04-24 NOTE — SIGNIFICANT NOTE
SW received request for transportation assistance. SW met with pt in radiation and provided pt with $20 in [employer] gas cards. PT has now received $60 total. Pt reported he is almost done with tx, and thanked OCTAVIANO for the assistance. SW will be available ongoing.       04/24/24 1300   Oncology Interventions   Transportation Needs gas cards  (provided $20 in [employer] cards)

## 2024-04-26 ENCOUNTER — HOSPITAL ENCOUNTER (OUTPATIENT)
Dept: RADIATION ONCOLOGY | Facility: HOSPITAL | Age: 69
Setting detail: RADIATION/ONCOLOGY SERIES
Discharge: HOME | End: 2024-04-26
Payer: MEDICARE

## 2024-04-26 PROCEDURE — 77336 RADIATION PHYSICS CONSULT: CPT | Performed by: RADIOLOGY

## 2024-04-26 PROCEDURE — 77373 STRTCTC BDY RAD THER TX DLVR: CPT | Performed by: RADIOLOGY

## 2024-05-15 DIAGNOSIS — Z79.899 MEDICATION MANAGEMENT: ICD-10-CM

## 2024-05-15 DIAGNOSIS — F33.40 RECURRENT MAJOR DEPRESSIVE DISORDER, IN REMISSION: ICD-10-CM

## 2024-05-16 RX ORDER — ALPRAZOLAM 2 MG/1
TABLET, EXTENDED RELEASE ORAL
Qty: 90 TABLET | Refills: 0 | Status: SHIPPED | OUTPATIENT
Start: 2024-05-16

## 2024-06-20 ENCOUNTER — OFFICE VISIT (OUTPATIENT)
Dept: UROLOGY | Facility: CLINIC | Age: 69
End: 2024-06-20
Payer: MEDICARE

## 2024-06-20 VITALS — OXYGEN SATURATION: 96 % | SYSTOLIC BLOOD PRESSURE: 113 MMHG | HEART RATE: 71 BPM | DIASTOLIC BLOOD PRESSURE: 77 MMHG

## 2024-06-20 DIAGNOSIS — C61 PROSTATE CANCER: Primary | ICD-10-CM

## 2024-06-20 DIAGNOSIS — N40.1 BPH WITH OBSTRUCTION/LOWER URINARY TRACT SYMPTOMS: ICD-10-CM

## 2024-06-20 DIAGNOSIS — N13.8 BPH WITH OBSTRUCTION/LOWER URINARY TRACT SYMPTOMS: ICD-10-CM

## 2024-06-20 DIAGNOSIS — N39.0 RECURRENT UTI: ICD-10-CM

## 2024-06-20 LAB
BILIRUB BLD-MCNC: NEGATIVE MG/DL
CLARITY, POC: CLEAR
COLOR UR: YELLOW
EXPIRATION DATE: NORMAL
GLUCOSE UR STRIP-MCNC: NEGATIVE MG/DL
KETONES UR QL: NEGATIVE
LEUKOCYTE EST, POC: NEGATIVE
Lab: NORMAL
NITRITE UR-MCNC: NEGATIVE MG/ML
PH UR: 5.5 [PH] (ref 5–8)
PROT UR STRIP-MCNC: NEGATIVE MG/DL
RBC # UR STRIP: NEGATIVE /UL
SP GR UR: 1.02 (ref 1–1.03)
UROBILINOGEN UR QL: NORMAL

## 2024-06-20 RX ORDER — TIZANIDINE 4 MG/1
4 TABLET ORAL EVERY 6 HOURS PRN
COMMUNITY
Start: 2024-06-17

## 2024-06-20 RX ORDER — TAMSULOSIN HYDROCHLORIDE 0.4 MG/1
1 CAPSULE ORAL DAILY
Qty: 90 CAPSULE | Refills: 1 | Status: SHIPPED | OUTPATIENT
Start: 2024-06-20

## 2024-06-20 RX ORDER — POTASSIUM CHLORIDE 20 MEQ/1
20 TABLET, EXTENDED RELEASE ORAL DAILY
COMMUNITY
Start: 2024-06-04

## 2024-06-20 NOTE — PROGRESS NOTES
Follow Up Office Visit      Patient Name: Cecilio Fierro  : 1955   MRN: [id number]     Chief Complaint:    Chief Complaint   Patient presents with    Prostate Cancer       Referring Provider: No ref. provider found    History of Present Illness: Cecilio Fierro is a 69 y.o. male who presents today for follow up of prostate cancer, BPH with lower inner tract symptoms.  Patient referred to me from a urologist in [facility].  Significant BPH with ongoing urinary straining, incomplete bladder emptying, recurrent UTI.               PSA history includes 2019 at 4.9, then 2019 at 5.3, then 2020 at 4.5, then 2020 at 3.5, then 2022 at 6.5, then 2022 at 5.3 with % free 12.5, then 3/2023 at 5.8 and 2023 at 4.1. Repeat PSA done 2023 at 3.9. He had MDx urine test performed showing 54% risk of prostate cancer with 26% risk of being Param score 7 or higher.      He does have a history of recurrent UTI.  Was recently treated with outpatient antibiotics for a UTI diagnosed closer to his home.Was found to have prostate cancer on biopsy, grade group 2 at the right base.  PSA has been elevated.  Was referred to radiation oncology and has completed CyberKnife radiation.  Underwent a UroLift with me in  implants.  Reports stable urinary symptoms and strong stream.  Continues to take tamsulosin.    No UTI concerns  Completed radiation 2024    Lawnmower recently tipped over on the patient presented to the hospital.  Recovering from his fall.  No significant issues at this time.    IPSS 8, pleased QOL. He has a flat affect        Subjective      Review of System: Review of Systems   Genitourinary:  Positive for frequency.      I have reviewed the ROS documented by my clinical staff, I have updated appropriately and I agree. Malcolm Barber MD    I have reviewed and the following portions of the patient's history were updated as appropriate: past family history, past medical  history, past social history, past surgical history and problem list.    Medications:     Current Outpatient Medications:     allopurinol (ZYLOPRIM) 300 MG tablet, Take 1 tablet by mouth Daily., Disp: , Rfl:     ALPRAZolam XR (XANAX XR) 2 MG 24 hr tablet, TAKE 1 TABLET BY MOUTH daily, Disp: 90 tablet, Rfl: 0    aspirin 81 MG EC tablet, Take 325 mg by mouth Daily., Disp: , Rfl:     bumetanide (BUMEX) 1 MG tablet, Take 2 tablets by mouth Daily., Disp: , Rfl:     doxepin (SINEquan) 50 MG capsule, TAKE TWO CAPSULES BY MOUTH ONCE DAILY AT BEDTIME FOR ANXIETY/REST, Disp: 90 capsule, Rfl: 1    fexofenadine (ALLEGRA) 180 MG tablet, Take 1 tablet by mouth Daily., Disp: , Rfl:     fluticasone (FLONASE) 50 MCG/ACT nasal spray, 2 sprays Daily., Disp: , Rfl:     folic acid (FOLVITE) 1 MG tablet, Take 1 tablet by mouth Every Night., Disp: , Rfl:     gabapentin (NEURONTIN) 800 MG tablet, Take 1 tablet by mouth 3 (Three) Times a Day., Disp: , Rfl:     metoprolol tartrate (LOPRESSOR) 25 MG tablet, Take 1 tablet by mouth 2 (Two) Times a Day., Disp: 180 tablet, Rfl: 3    montelukast (SINGULAIR) 10 MG tablet, Take 1 tablet by mouth Every Night., Disp: , Rfl:     naloxone (NARCAN) 4 MG/0.1ML nasal spray, Call 911. Don't prime. [place] in 1 nostril for overdose. Repeat in 2-3 minutes in other nostril if no or minimal breathing/responsiveness., Disp: 2 each, Rfl: 0    nitroglycerin (NITROSTAT) 0.4 MG SL tablet, Place 1 tablet under the tongue Every 5 (Five) Minutes As Needed for Chest Pain., Disp: , Rfl:     omeprazole (priLOSEC) 40 MG capsule, Take 1 capsule by mouth Daily., Disp: , Rfl:     PE-Triprolidine-DM-GG-APAP (MUCINEX CNG/CGH/COLD/FLU DY/NT PO), Take  by mouth. Daytime formula 3 times a day nighttime formula every night, Disp: , Rfl:     potassium chloride (KLOR-CON M20) 20 MEQ CR tablet, Take 1 tablet by mouth Daily., Disp: , Rfl:     pravastatin (PRAVACHOL) 40 MG tablet, Take 1 tablet by mouth Every Night., Disp: , Rfl:      promethazine (PHENERGAN) 25 MG tablet, Take 1 tablet by mouth As Needed for Nausea or Vomiting., Disp: , Rfl:     rOPINIRole (REQUIP) 3 MG tablet, Take 1 tablet by mouth 2 (Two) Times a Day. Take 1 hour before bedtime., Disp: , Rfl:     tamsulosin (FLOMAX) 0.4 MG capsule 24 hr capsule, Take 1 capsule by mouth Daily., Disp: 90 capsule, Rfl: 1    tiZANidine (ZANAFLEX) 4 MG tablet, Take 1 tablet by mouth Every 6 (Six) Hours As Needed., Disp: , Rfl:     traZODone (DESYREL) 100 MG tablet, TAKE one TABLETS BY MOUTH AT BEDTIME FOR SLEEP, Disp: 90 tablet, Rfl: 1    Wixela Inhub 250-50 MCG/DOSE DISKUS, Inhale 2 puffs Daily., Disp: , Rfl:     DULoxetine (Cymbalta) 60 MG capsule, Take one bid., Disp: 180 capsule, Rfl: 1    methocarbamol (ROBAXIN) 750 MG tablet, Take 1 tablet by mouth 2 (Two) Times a Day. (Patient not taking: Reported on 6/20/2024), Disp: , Rfl:     Allergies:   Allergies   Allergen Reactions    Morphine Itching, Other (See Comments), Unknown (See Comments) and Unknown - Low Severity     urinary retention    urinary retention   urinary retention    Other reaction(s): Unknown   urinary retention   urinary retention   urinary retention   urinary retention    Ondansetron Itching, Unknown (See Comments), Unknown - Low Severity and Rash     Caused nausea and itching.     Caused nausea and itching.    Caused nausea and itching.    Levaquin [Levofloxacin] Hives    Tramadol Rash    Zofran [Ondansetron Hcl] Other (See Comments)     Caused nausea and itching.        IPSS Questionnaire (AUA-7):  Over the past month…    1)  Incomplete Emptying:       How often have you had a sensation of not emptying you had the sensation of not emptying your bladder completely after you finished urinating?  0 - Not at all   2)  Frequency:       How often have you had the urinate again less than two hours after you finished urinating?  4 - More than half the time   3)  Intermittency:       How often have you found you stopped and started  again several times when you urinated?   1 - Less than 1 time in 5   4) Urgency:      How often have you found it difficult to postpone urination?  0 - Not at all   5) Weak Stream:      How often have you had a weak urinary stream?  2 - Less than half the time   6) Straining:       How often have you had to push or strain to begin urination?  0 - Not at all   7) Nocturia:      How many times did you most typically get up to urinate from the time you went to bed at night until the time you got up in the morning?  1 - 1 time   Total Score:  8   The International Prostate Symptom Score (IPSS) is used to screen, diagnose, track symptoms of benign prostatic hyperplasia (BPH).   0-7 (Mild Symptoms) 8-19 (Moderate) 20-35 (Severe)   Quality of Life (QoL):  If you were to spend the rest of your life with your urinary condition just the way it is now, how would you feel about that? 1-Pleased   Urine Leakage (Incontinence) 0-No Leakage       Objective     Physical Exam:   Vital Signs:   Vitals:    06/20/24 1042   BP: 113/77   Pulse: 71   SpO2: 96%     There is no height or weight on file to calculate BMI.     Physical Exam  Constitutional:       Appearance: Normal appearance.   HENT:      Head: Normocephalic and atraumatic.      Nose: Nose normal.   Eyes:      Extraocular Movements: Extraocular movements intact.      Conjunctiva/sclera: Conjunctivae normal.      Pupils: Pupils are equal, round, and reactive to light.   Musculoskeletal:         General: Normal range of motion.      Cervical back: Normal range of motion and neck supple.   Skin:     General: Skin is warm and dry.      Findings: No lesion or rash.   Neurological:      General: No focal deficit present.      Mental Status: He is alert and oriented to person, place, and time. Mental status is at baseline.   Psychiatric:         Mood and Affect: Mood normal.         Behavior: Behavior normal.         Labs:   Brief Urine Lab Results  (Last result in the past 365 days)         Color   Clarity   Blood   Leuk Est   Nitrite   Protein   CREAT   Urine HCG        02/15/24 1227 Yellow   Clear   Negative   Negative   Negative   Negative                   Urine Culture          10/12/2023    17:07   Urine Culture   Urine Culture No growth         Lab Results   Component Value Date    GLUCOSE 118 (H) 01/02/2024    CALCIUM 9.6 01/02/2024     01/02/2024    K 4.3 01/26/2024    CO2 27.0 01/02/2024     01/02/2024    BUN 19 01/02/2024    CREATININE 1.70 (H) 04/04/2024    EGFRIFNONA 68 04/18/2018    BCR 14.4 01/02/2024    ANIONGAP 12.0 01/02/2024       Lab Results   Component Value Date    WBC 10.18 01/02/2024    HGB 16.0 01/02/2024    HCT 47.2 01/02/2024    MCV 95.4 01/02/2024     01/02/2024       Images:   CT Cervical Spine Without Contrast    Result Date: 6/10/2024       1.   No acute fracture or subluxation.      2.   Mild central canal narrowing C5-C6 and C6-C7.  3. Moderate right and mild left neuroforaminal narrowing C5-C6.  4. Mild-to-moderate right and mild left neuroforaminal narrowing C6-C7.       Created by: Corona Conn MD, PHD  Signed by: Corona Conn MD, PHD  Signed on: 6/10/2024 7:57 AM  Location: [facility]            CT Head Without Contrast    Result Date: 6/10/2024   No acute intracranial process.  Created by: Corona Conn MD, PHD  Signed by: Corona Conn MD, PHD  Signed on: 6/10/2024 7:54 AM  Location: [facility]              Measures:   Tobacco:   Cecilio Fierro  reports that he has been smoking cigarettes. He has a 26 pack-year smoking history. He has been exposed to tobacco smoke. He has quit using smokeless tobacco.  His smokeless tobacco use included chew. I have educated him on the risk of diseases from using tobacco products such as cancer, COPD, and heart disease.     I advised him to quit and he is not willing to quit.    I spent 3  minutes counseling the patient.       Assessment / Plan      Assessment/Plan:   69 y.o.  male who presented today for follow up of favorable intermediate risk prostate cancer, BPH with LUTS s/p UROLIFT, atypical cytology and multiple cystoscopies which demonstrate no bladder cancer.  There was an initial bladder cancer concern by his outside urologist who performed cystoscopy but this has not been identified after extensive evaluation.  Prostate cancer status post completion of radiation 2 months ago.  Repeat his PSA in the fall.  He will continue the tamsulosin given stability in urinary symptoms with low IPSS score at this time.  No additional concerns.    Diagnoses and all orders for this visit:    1. Prostate cancer (Primary)  -     PSA Diagnostic; Future    2. BPH with obstruction/lower urinary tract symptoms  -     tamsulosin (FLOMAX) 0.4 MG capsule 24 hr capsule; Take 1 capsule by mouth Daily.  Dispense: 90 capsule; Refill: 1    3. Recurrent UTI           Follow Up:   Return in about 3 months (around 9/20/2024).    I spent approximately 30 minutes providing clinical care for this patient; including review of patient's chart and provider documentation, face to face time spent with patient in examination room (obtaining history, performing physical exam, discussing diagnosis and management options), placing orders, and completing patient documentation.     Malcolm Barber MD  [facility]

## 2024-06-26 ENCOUNTER — TELEPHONE (OUTPATIENT)
Dept: UROLOGY | Facility: CLINIC | Age: 69
End: 2024-06-26
Payer: MEDICARE

## 2024-06-26 NOTE — TELEPHONE ENCOUNTER
Caller: Nevaeh Fierro     Relationship: SPOUSE    Best call back number: [phone]     What is the best time to reach you: ANYTIME    Who are you requesting to speak with (clinical staff, provider,  specific staff member): CLINICAL    Do you know the name of the person who called: INCOMING CALL    What was the call regarding: PT'S WIFE CALLING TO ASK ABOUT HIS LAB RESULTS. PT CAME TO OFFICE TO DO URINALYSIS.     Is it okay if the provider responds through MyChart: NO

## 2024-07-05 ENCOUNTER — OFFICE VISIT (OUTPATIENT)
Dept: CARDIOLOGY | Facility: CLINIC | Age: 69
End: 2024-07-05
Payer: MEDICARE

## 2024-07-05 VITALS
OXYGEN SATURATION: 96 % | HEIGHT: 70 IN | SYSTOLIC BLOOD PRESSURE: 110 MMHG | HEART RATE: 70 BPM | WEIGHT: 273.6 LBS | BODY MASS INDEX: 39.17 KG/M2 | DIASTOLIC BLOOD PRESSURE: 72 MMHG

## 2024-07-05 DIAGNOSIS — I25.10 CORONARY ARTERY DISEASE INVOLVING NATIVE CORONARY ARTERY OF NATIVE HEART WITHOUT ANGINA PECTORIS: ICD-10-CM

## 2024-07-05 DIAGNOSIS — R00.1 SYMPTOMATIC BRADYCARDIA: Primary | ICD-10-CM

## 2024-07-05 DIAGNOSIS — Z95.0 PACEMAKER: ICD-10-CM

## 2024-07-05 NOTE — PROGRESS NOTES
Cardiac Electrophysiology Outpatient Note  [facility]    Office Visit     Cecilio Fierro  [id number]  11/19/2019    Primary Care Physician: Praveen Odell MD    Referred By: No ref. provider found    Subjective     Chief Complaint   Patient presents with    Coronary Artery Disease       History of Present Illness:   Cecilio Fierro is a 69 y.o. male who presents to my electrophysiology clinic for follow up of a bradycardia status post [employer] dual-chamber pacemaker, atrial tachycardia, coronary disease.  Since his last visit he is overall doing well from a cardiac standpoint.  Does have prostate cancer and underwent CyberKnife recently.  Follow-up in September to see the results of this.  Activity is limited mostly due to orthopedic issues.  Does not have any significant shortness of breath right now.  Has occasional dizziness but no syncope.  Rare palpitations that are overall not too bothersome at the current time.  No problems with his pacemaker site.      Past Medical History:   Diagnosis Date    Anxiety     Anxiety and depression 01/13/2017    Arthritis     Bleeds easily     per pt and wife     Bursitis     left hip     CAD (coronary artery disease) 01/13/2017    5 stents in heart     Chronic kidney failure     sees Dr Hicks every 6 months    Depression     Diverticulosis 01/13/2017    Dyslipidemia 01/13/2017    Emphysema of lung     GERD (gastroesophageal reflux disease)     Gout     Hemorrhoid     needs surgery     Hyperlipidemia     Hypertension 01/13/2017    Low back pain     needs another back surgery     Neuropathy 01/13/2017    Pacemaker at end of battery life     Palpitations     worsening since august 2017    Prostate cancer     Restless leg syndrome     Sleep apnea with use of continuous positive airway pressure (CPAP)     Symptomatic bradycardia 01/13/2017    TIA (transient ischemic attack)     Questionable TIA vs. seizure, June 2014. Negative  head CT and carotid duplex.Residual left facial droop and speech impairment, July 2014.     Tobacco use 01/13/2017    Wears dentures     upper only     Wears glasses        Past Surgical History:   Procedure Laterality Date    ADRENAL GLAND SURGERY Right 02/2019    BACK SURGERY      lumbar x stop placed    CARDIAC CATHETERIZATION  02/25/2015    Dr. Huizar- Non Obstructive disease    CARDIAC ELECTROPHYSIOLOGY PROCEDURE N/A 02/21/2018    PPM battery change- Dr.Sandeep Latham    CARDIOVASCULAR STRESS TEST  12/30/2021    Lexiscan- EF 50%. Negative. Poor Study    CHOLECYSTECTOMY      COLONOSCOPY      CORONARY ANGIOPLASTY WITH STENT PLACEMENT      x5    ECHO - CONVERTED  04/18/2018    -= EF 55%.Trace MR    ECHO - CONVERTED  12/30/2021    EF 65%. LA- 4.8 Cm. Trace-Mild MR & AI. AO- 4.2 Cm    ENDOSCOPY      HEMORRHOIDECTOMY      INSERT / REPLACE / REMOVE PACEMAKER  2003    pacemaker originally inserted in 2003 by Dr Chawla     JOINT REPLACEMENT Bilateral     knees    LIPOMA RESECTION      Lipoma resection from abdominal wall.     OTHER SURGICAL HISTORY      Bullet removal. total of 7 entered left shoulder removed from chest abd and buttock ( expl Lap )    PACEMAKER IMPLANTATION  10/2003    Generator change 02/14/2007, [employer]. -Nicole     PET MULTI STUDY REST STRESS  01/26/2015    - EF 55%. No Ischemia       Family History   Problem Relation Age of Onset    Cancer Father     Lung cancer Father     Brain cancer Sister     Brain cancer Brother     Colon cancer Brother     Prostate cancer Brother     Cancer Other     Diabetes Other     Alzheimer's disease Other     Stroke Other     Heart disease Other     Hypertension Other        Social History     Socioeconomic History    Marital status:    Tobacco Use    Smoking status: Every Day     Current packs/day: 0.50     Average packs/day: 0.5 packs/day for 52.0 years (26.0 ttl pk-yrs)     Types: Cigarettes     Passive exposure: Current     Smokeless tobacco: Former     Types: Chew    Tobacco comments:     Pt now under 0.5 ppd    Vaping Use    Vaping status: Never Used   Substance and Sexual Activity    Alcohol use: No    Drug use: No    Sexual activity: Not Currently     Partners: Female         Current Outpatient Medications:     allopurinol (ZYLOPRIM) 300 MG tablet, Take 1 tablet by mouth Daily., Disp: , Rfl:     ALPRAZolam XR (XANAX XR) 2 MG 24 hr tablet, TAKE 1 TABLET BY MOUTH daily, Disp: 90 tablet, Rfl: 0    aspirin 81 MG EC tablet, Take 1 tablet by mouth Daily., Disp: , Rfl:     bumetanide (BUMEX) 1 MG tablet, Take 2 tablets by mouth Daily., Disp: , Rfl:     doxepin (SINEquan) 50 MG capsule, TAKE TWO CAPSULES BY MOUTH ONCE DAILY AT BEDTIME FOR ANXIETY/REST, Disp: 90 capsule, Rfl: 1    DULoxetine (Cymbalta) 60 MG capsule, Take one bid., Disp: 180 capsule, Rfl: 1    fexofenadine (ALLEGRA) 180 MG tablet, Take 1 tablet by mouth Daily., Disp: , Rfl:     fluticasone (FLONASE) 50 MCG/ACT nasal spray, 2 sprays Daily., Disp: , Rfl:     folic acid (FOLVITE) 1 MG tablet, Take 1 tablet by mouth Every Night., Disp: , Rfl:     gabapentin (NEURONTIN) 800 MG tablet, Take 1 tablet by mouth 3 (Three) Times a Day., Disp: , Rfl:     methocarbamol (ROBAXIN) 750 MG tablet, Take 1 tablet by mouth Daily As Needed., Disp: , Rfl:     metoprolol tartrate (LOPRESSOR) 25 MG tablet, Take 1 tablet by mouth 2 (Two) Times a Day., Disp: 180 tablet, Rfl: 3    montelukast (SINGULAIR) 10 MG tablet, Take 1 tablet by mouth Every Night., Disp: , Rfl:     naloxone (NARCAN) 4 MG/0.1ML nasal spray, Call 911. Don't prime. [place] in 1 nostril for overdose. Repeat in 2-3 minutes in other nostril if no or minimal breathing/responsiveness., Disp: 2 each, Rfl: 0    nitroglycerin (NITROSTAT) 0.4 MG SL tablet, Place 1 tablet under the tongue Every 5 (Five) Minutes As Needed for Chest Pain., Disp: , Rfl:     omeprazole (priLOSEC) 40 MG capsule, Take 1 capsule by mouth Daily., Disp: , Rfl:      "PE-Triprolidine-DM-GG-APAP (MUCINEX CNG/CGH/COLD/FLU DY/NT PO), Take  by mouth. Daytime formula 3 times a day nighttime formula every night, Disp: , Rfl:     potassium chloride (KLOR-CON M20) 20 MEQ CR tablet, Take 1 tablet by mouth Daily., Disp: , Rfl:     pravastatin (PRAVACHOL) 40 MG tablet, Take 1 tablet by mouth Every Night., Disp: , Rfl:     promethazine (PHENERGAN) 25 MG tablet, Take 1 tablet by mouth As Needed for Nausea or Vomiting., Disp: , Rfl:     rOPINIRole (REQUIP) 3 MG tablet, Take 1 tablet by mouth 2 (Two) Times a Day. Take 1 hour before bedtime., Disp: , Rfl:     tamsulosin (FLOMAX) 0.4 MG capsule 24 hr capsule, Take 1 capsule by mouth Daily., Disp: 90 capsule, Rfl: 1    tiZANidine (ZANAFLEX) 4 MG tablet, Take 1 tablet by mouth Every 6 (Six) Hours As Needed., Disp: , Rfl:     traZODone (DESYREL) 100 MG tablet, TAKE one TABLETS BY MOUTH AT BEDTIME FOR SLEEP, Disp: 90 tablet, Rfl: 1    Wixela Inhub 250-50 MCG/DOSE DISKUS, Inhale 2 puffs Daily., Disp: , Rfl:     Allergies:   Allergies   Allergen Reactions    Morphine Itching, Other (See Comments), Unknown (See Comments) and Unknown - Low Severity     urinary retention    urinary retention   urinary retention    Other reaction(s): Unknown   urinary retention   urinary retention   urinary retention   urinary retention    Ondansetron Itching, Unknown (See Comments), Unknown - Low Severity and Rash     Caused nausea and itching.     Caused nausea and itching.    Caused nausea and itching.    Levaquin [Levofloxacin] Hives    Tramadol Rash    Zofran [Ondansetron Hcl] Other (See Comments)     Caused nausea and itching.        Objective   Vital Signs: Blood pressure 110/72, pulse 70, height 177.8 cm (70\"), weight 124 kg (273 lb 9.6 oz), SpO2 96%.    PHYSICAL EXAM  General appearance: Awake, alert, cooperative  Head: Normocephalic, without obvious abnormality, atraumatic  Neck: No JVD  Lungs: Clear to ascultation bilaterally  Heart: Regular rate and rhythm, no " murmurs, 2+ LE pulses, trace left lower extremity swelling  Skin: Skin color, turgor normal, no rashes or lesions  Neurologic: Grossly normal     Lab Results   Component Value Date    GLUCOSE 118 (H) 01/02/2024    CALCIUM 9.6 01/02/2024     01/02/2024    K 4.3 01/26/2024    CO2 27.0 01/02/2024     01/02/2024    BUN 19 01/02/2024    CREATININE 1.70 (H) 04/04/2024    EGFRIFNONA 68 04/18/2018    BCR 14.4 01/02/2024    ANIONGAP 12.0 01/02/2024     Lab Results   Component Value Date    WBC 10.18 01/02/2024    HGB 16.0 01/02/2024    HCT 47.2 01/02/2024    MCV 95.4 01/02/2024     01/02/2024     Lab Results   Component Value Date    INR 0.90 (L) 02/20/2018    PROTIME 9.4 (L) 02/20/2018     Lab Results   Component Value Date    TSH 0.259 (L) 12/15/2015          Results for orders placed during the hospital encounter of 01/03/24    Adult Transthoracic Echo Complete w/ Color, Spectral and Contrast if necessary per protocol    Interpretation Summary    Normal left ventricular cavity size and wall thickness noted. All left ventricular wall segments contract normally.    Left ventricular ejection fraction appears to be 61 - 65%.    Left ventricular diastolic function is consistent with (grade I) impaired relaxation.    The aortic valve is structurally normal with no regurgitation or stenosis present.    The mitral valve is structurally normal with no regurgitation or significant stenosis present.    There is no evidence of pericardial effusion. .       Cecilio Fierro  reports that he has been smoking cigarettes. He has a 26 pack-year smoking history. He has been exposed to tobacco smoke. He has quit using smokeless tobacco.  His smokeless tobacco use included chew..     Advance Care Planning   ACP discussion was held with the patient during this visit. Patient does not have an advance directive, information provided.       Assessment & Plan    1. Pacemaker  His [employer] dual-chamber pacemaker was  interrogated and is functioning well.  He is approximately 4.3 years of battery life remaining.  He is pacing 556% of the atrium less than 1% of time in the ventricle.  His pacing sensing thresholds all the normal limits.  He had a few episodes of atrial tachycardia, overall appears less than prior.  Longest episode was 8 minutes.    2. Coronary artery disease involving native coronary artery of native heart without angina pectoris  He has a history of coronary disease status post PCI.  He does not have any chest pain currently.  He will continue on metoprolol, aspirin and statin.    3. Atrial tachycardia (HCC)  Does have atrial tachycardia noted on his device.  Longest episode 8 minutes.  Minimally symptomatic at the current time.  Will continue to monitor.  No clear A-fib noted.       Follow Up:  Return in about 1 year (around 7/5/2025) for [facility].      Thank you for allowing me to participate in the care of your patient. Please do not hesitate to contact me with additional questions or concerns.      Mikel Mae M.D.  Cardiac Electrophysiologist  [facility]

## 2024-08-13 ENCOUNTER — OFFICE VISIT (OUTPATIENT)
Dept: PSYCHIATRY | Facility: CLINIC | Age: 69
End: 2024-08-13
Payer: MEDICARE

## 2024-08-13 ENCOUNTER — HOSPITAL ENCOUNTER (OUTPATIENT)
Dept: GENERAL RADIOLOGY | Facility: HOSPITAL | Age: 69
Discharge: HOME | End: 2024-08-13
Payer: MEDICARE

## 2024-08-13 ENCOUNTER — TRANSCRIBE ORDERS (OUTPATIENT)
Dept: LAB | Facility: HOSPITAL | Age: 69
End: 2024-08-13
Payer: MEDICARE

## 2024-08-13 VITALS
BODY MASS INDEX: 37.55 KG/M2 | HEIGHT: 70 IN | DIASTOLIC BLOOD PRESSURE: 80 MMHG | SYSTOLIC BLOOD PRESSURE: 132 MMHG | OXYGEN SATURATION: 98 % | WEIGHT: 262.3 LBS | HEART RATE: 73 BPM

## 2024-08-13 DIAGNOSIS — M25.551 RIGHT HIP PAIN: ICD-10-CM

## 2024-08-13 DIAGNOSIS — M51.17 INTERVERTEBRAL DISC DISORDER WITH RADICULOPATHY OF LUMBOSACRAL REGION: ICD-10-CM

## 2024-08-13 DIAGNOSIS — F33.40 RECURRENT MAJOR DEPRESSIVE DISORDER, IN REMISSION: Primary | ICD-10-CM

## 2024-08-13 DIAGNOSIS — Z79.899 MEDICATION MANAGEMENT: ICD-10-CM

## 2024-08-13 DIAGNOSIS — M51.17 INTERVERTEBRAL DISC DISORDER WITH RADICULOPATHY OF LUMBOSACRAL REGION: Primary | ICD-10-CM

## 2024-08-13 PROCEDURE — 72110 X-RAY EXAM L-2 SPINE 4/>VWS: CPT | Performed by: RADIOLOGY

## 2024-08-13 PROCEDURE — 73503 X-RAY EXAM HIP UNI 4/> VIEWS: CPT | Performed by: RADIOLOGY

## 2024-08-13 PROCEDURE — 72110 X-RAY EXAM L-2 SPINE 4/>VWS: CPT

## 2024-08-13 PROCEDURE — 73503 X-RAY EXAM HIP UNI 4/> VIEWS: CPT

## 2024-08-13 RX ORDER — TRAZODONE HYDROCHLORIDE 100 MG/1
TABLET ORAL
Qty: 90 TABLET | Refills: 1 | Status: SHIPPED | OUTPATIENT
Start: 2024-08-13

## 2024-08-13 RX ORDER — FLUTICASONE FUROATE, UMECLIDINIUM BROMIDE AND VILANTEROL TRIFENATATE 200; 62.5; 25 UG/1; UG/1; UG/1
POWDER RESPIRATORY (INHALATION)
COMMUNITY
Start: 2024-08-02

## 2024-08-13 RX ORDER — DOXEPIN HYDROCHLORIDE 50 MG/1
CAPSULE ORAL
Qty: 90 CAPSULE | Refills: 1 | Status: SHIPPED | OUTPATIENT
Start: 2024-08-13

## 2024-08-13 RX ORDER — ALPRAZOLAM 2 MG/1
TABLET, EXTENDED RELEASE ORAL
Qty: 90 TABLET | Refills: 1 | Status: SHIPPED | OUTPATIENT
Start: 2024-08-13

## 2024-08-13 RX ORDER — IPRATROPIUM BROMIDE AND ALBUTEROL SULFATE 2.5; .5 MG/3ML; MG/3ML
SOLUTION RESPIRATORY (INHALATION)
COMMUNITY
Start: 2024-08-02

## 2024-08-13 RX ORDER — DULOXETIN HYDROCHLORIDE 60 MG/1
CAPSULE, DELAYED RELEASE ORAL
Qty: 180 CAPSULE | Refills: 1 | Status: SHIPPED | OUTPATIENT
Start: 2024-08-13

## 2024-08-13 NOTE — PROGRESS NOTES
"Patient ID: Cecilio Fierro is a 69 y.o. male    SERVICE TYPE: EVALUATION AND MANAGEMENT (greater than 50% of the time spent for supportive psychotherapy).      /80   Pulse 73   Ht 177.8 cm (70\")   Wt 119 kg (262 lb 4.8 oz)   SpO2 98%   BMI 37.64 kg/m²     ALLERGIES:  Morphine, Ondansetron, Levaquin [levofloxacin], Tramadol, and Zofran [ondansetron hcl]    CC/ Focus of the visit:  depression/ past hx of Alcohol Abuse     HPI: Patient continues to struggle with physical/medical issues, currently had a COVID-like syndrome for the past month with weakness and fatigue and a persistent cough, he continues to undergo evaluations and diagnostic test to determine cause.  He has tested negative for COVID 3 times.  He has recovered from his CyberKnife prostate cancer procedures awaiting follow-up PSA test.  Emotionally he states he's coped with the medical issues without recurrence of significant depressive or anxious symptomatology.  He sleeps on average 6 or 7 hours per night using a CPAP.  His activities interest continue to thrive adapting to his physical limitations.  He is now taking up wiggling.  He is assist his wife with the  of the great grandson and now a 2-year-old adopted granddaughter.    Once again no indication of prescription misuse or substance abuse.    PFSH: Home life is stable.    Review of Systems  No cardiopulmonary, GI or neurological complaints.    SUPPORTIVE PSYCHOTHERAPY: continuing efforts to promote the therapeutic alliance, address the patient’s issues, and strengthen self awareness, insights, and positive coping skills such as Exercising, listen to music, spending time in nature and utilizing resources.     Mental Status Exam  Appearance:  appropriate  Attitude toward clinician:  cooperative and agreeable   Speech:    Rate:  regular rate and rhythm   Volume: normal  Motor:  no abnormal movements   Mood:  Good  Affect:  euthymic  Thought Processes:  linear, logical, and goal " directed  Thought Content:  Normal   Feeling Hopeless: absent  Suicidal Thoughts or Intent:  absent  Homicidal Thoughts:  absent  Perceptual Disturbance: no perceptual disturbance  Attention and Concentration:  good  Insight and Judgement:  good  Memory:  memory appears to be intact    LABS: No results found for this or any previous visit (from the past 168 hour(s)).    MEDICATION ISSUES: Have discussed with the patient the medications Risks, Benefits, and Side effects including potential falls, possible impaired driving and  metabolic adversities among others. No medication side effects or related complaints today.     TREATMENT PLAN/GOALS: Continue supportive psychotherapy efforts and medications as indicated.     Current Outpatient Medications   Medication Sig Dispense Refill    allopurinol (ZYLOPRIM) 300 MG tablet Take 1 tablet by mouth Daily.      ALPRAZolam XR (XANAX XR) 2 MG 24 hr tablet TAKE 1 TABLET BY MOUTH daily 90 tablet 0    aspirin 81 MG EC tablet Take 1 tablet by mouth Daily.      bumetanide (BUMEX) 1 MG tablet Take 2 tablets by mouth Daily.      doxepin (SINEquan) 50 MG capsule TAKE TWO CAPSULES BY MOUTH ONCE DAILY AT BEDTIME FOR ANXIETY/REST 90 capsule 1    DULoxetine (Cymbalta) 60 MG capsule Take one bid. 180 capsule 1    fexofenadine (ALLEGRA) 180 MG tablet Take 1 tablet by mouth Daily.      fluticasone (FLONASE) 50 MCG/ACT nasal spray 2 sprays Daily.      folic acid (FOLVITE) 1 MG tablet Take 1 tablet by mouth Every Night.      gabapentin (NEURONTIN) 800 MG tablet Take 1 tablet by mouth 3 (Three) Times a Day.      ipratropium-albuterol (DUO-NEB) 0.5-2.5 mg/3 ml nebulizer       methocarbamol (ROBAXIN) 750 MG tablet Take 1 tablet by mouth Daily As Needed.      metoprolol tartrate (LOPRESSOR) 25 MG tablet Take 1 tablet by mouth 2 (Two) Times a Day. 180 tablet 3    montelukast (SINGULAIR) 10 MG tablet Take 1 tablet by mouth Every Night.      naloxone (NARCAN) 4 MG/0.1ML nasal spray Call 911. Don't  prime. Spray in 1 nostril for overdose. Repeat in 2-3 minutes in other nostril if no or minimal breathing/responsiveness. 2 each 0    nitroglycerin (NITROSTAT) 0.4 MG SL tablet Place 1 tablet under the tongue Every 5 (Five) Minutes As Needed for Chest Pain.      omeprazole (priLOSEC) 40 MG capsule Take 1 capsule by mouth Daily.      PE-Triprolidine-DM-GG-APAP (MUCINEX CNG/CGH/COLD/FLU DY/NT PO) Take  by mouth. Daytime formula 3 times a day nighttime formula every night      potassium chloride (KLOR-CON M20) 20 MEQ CR tablet Take 1 tablet by mouth Daily.      pravastatin (PRAVACHOL) 40 MG tablet Take 1 tablet by mouth Every Night.      promethazine (PHENERGAN) 25 MG tablet Take 1 tablet by mouth As Needed for Nausea or Vomiting.      rOPINIRole (REQUIP) 3 MG tablet Take 1 tablet by mouth 2 (Two) Times a Day. Take 1 hour before bedtime.      tamsulosin (FLOMAX) 0.4 MG capsule 24 hr capsule Take 1 capsule by mouth Daily. 90 capsule 1    tiZANidine (ZANAFLEX) 4 MG tablet Take 1 tablet by mouth Every 6 (Six) Hours As Needed.      traZODone (DESYREL) 100 MG tablet TAKE one TABLETS BY MOUTH AT BEDTIME FOR SLEEP 90 tablet 1    Trelegy Ellipta 200-62.5-25 MCG/ACT inhaler       Wixela Inhub 250-50 MCG/DOSE DISKUS Inhale 2 puffs Daily.       No current facility-administered medications for this visit.       COLLATERAL PSYCHOTHERAPEUTIC INTERVENTION: patient not interested in additional psychotherapy.    RISK:  moderate    Assessment & Plan     There are no diagnoses linked to this encounter.    No follow-ups on file.           Patient knows to call if symptoms worsen or fail to improve between appointments.    I spent 30 minutes caring for Cecilio on this date of service. This time includes time spent by me in the following activities: Patient evaluation, support psychotherapy, decisions, medications, and documentation.     Dictated utilizing Dragon dictation    RICARDO Almonte MD

## 2024-09-26 ENCOUNTER — OFFICE VISIT (OUTPATIENT)
Dept: UROLOGY | Facility: CLINIC | Age: 69
End: 2024-09-26
Payer: MEDICARE

## 2024-09-26 VITALS — OXYGEN SATURATION: 96 % | HEART RATE: 69 BPM | DIASTOLIC BLOOD PRESSURE: 77 MMHG | SYSTOLIC BLOOD PRESSURE: 116 MMHG

## 2024-09-26 DIAGNOSIS — R39.9 LOWER URINARY TRACT SYMPTOMS (LUTS): ICD-10-CM

## 2024-09-26 DIAGNOSIS — N13.8 BPH WITH OBSTRUCTION/LOWER URINARY TRACT SYMPTOMS: ICD-10-CM

## 2024-09-26 DIAGNOSIS — S69.90XA FINGER INJURY, INITIAL ENCOUNTER: ICD-10-CM

## 2024-09-26 DIAGNOSIS — N40.1 BPH WITH OBSTRUCTION/LOWER URINARY TRACT SYMPTOMS: ICD-10-CM

## 2024-09-26 DIAGNOSIS — C61 PROSTATE CANCER: Primary | ICD-10-CM

## 2024-09-26 PROCEDURE — 1160F RVW MEDS BY RX/DR IN RCRD: CPT | Performed by: STUDENT IN AN ORGANIZED HEALTH CARE EDUCATION/TRAINING PROGRAM

## 2024-09-26 PROCEDURE — 1159F MED LIST DOCD IN RCRD: CPT | Performed by: STUDENT IN AN ORGANIZED HEALTH CARE EDUCATION/TRAINING PROGRAM

## 2024-09-26 PROCEDURE — 99214 OFFICE O/P EST MOD 30 MIN: CPT | Performed by: STUDENT IN AN ORGANIZED HEALTH CARE EDUCATION/TRAINING PROGRAM

## 2024-09-26 PROCEDURE — 3074F SYST BP LT 130 MM HG: CPT | Performed by: STUDENT IN AN ORGANIZED HEALTH CARE EDUCATION/TRAINING PROGRAM

## 2024-09-26 PROCEDURE — 3078F DIAST BP <80 MM HG: CPT | Performed by: STUDENT IN AN ORGANIZED HEALTH CARE EDUCATION/TRAINING PROGRAM

## 2024-09-26 RX ORDER — TAMSULOSIN HYDROCHLORIDE 0.4 MG/1
1 CAPSULE ORAL DAILY
Qty: 90 CAPSULE | Refills: 1 | Status: SHIPPED | OUTPATIENT
Start: 2024-09-26

## 2024-10-28 ENCOUNTER — HOSPITAL ENCOUNTER (OUTPATIENT)
Dept: RADIATION ONCOLOGY | Facility: HOSPITAL | Age: 69
Setting detail: RADIATION/ONCOLOGY SERIES
Discharge: HOME | End: 2024-10-28
Payer: MEDICARE

## 2024-10-28 ENCOUNTER — OFFICE VISIT (OUTPATIENT)
Dept: RADIATION ONCOLOGY | Facility: HOSPITAL | Age: 69
End: 2024-10-28
Payer: MEDICARE

## 2024-10-28 VITALS
TEMPERATURE: 97.1 F | RESPIRATION RATE: 16 BRPM | BODY MASS INDEX: 36.89 KG/M2 | DIASTOLIC BLOOD PRESSURE: 78 MMHG | OXYGEN SATURATION: 97 % | HEART RATE: 69 BPM | SYSTOLIC BLOOD PRESSURE: 123 MMHG | WEIGHT: 257.1 LBS

## 2024-10-28 DIAGNOSIS — C61 PROSTATE CANCER: Primary | ICD-10-CM
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
## 2024-11-05 NOTE — TELEPHONE ENCOUNTER
Ordered a prescription for doxepin 25 mg #30 with 2 refills for the patient to take 1 at bedtime.   PDMP reviewed; no aberrant behavior identified, prescription authorized. Vyvanse 50mg last dispensed 10/1/2024, refill faxed

## 2024-11-13 PROCEDURE — 93294 REM INTERROG EVL PM/LDLS PM: CPT | Performed by: STUDENT IN AN ORGANIZED HEALTH CARE EDUCATION/TRAINING PROGRAM

## 2024-11-13 PROCEDURE — 93296 REM INTERROG EVL PM/IDS: CPT | Performed by: STUDENT IN AN ORGANIZED HEALTH CARE EDUCATION/TRAINING PROGRAM

## 2025-01-28 ENCOUNTER — TELEPHONE (OUTPATIENT)
Dept: CARDIOLOGY | Facility: CLINIC | Age: 70
End: 2025-01-28
Payer: MEDICARE

## 2025-01-28 NOTE — TELEPHONE ENCOUNTER
Pt's spouse called to report pt has been experiencing near syncope spells when bending over & standing back up, for about 1 week. Pt saw his PCP today who stated he needed to speak with his cardiologist. Pt has been in his usual state of health, denies recent illnesses. Unsuccessfully attempted to assist pt/spouse with manual remote [employer] pacemaker transmission, several attempts. Advised Naresh Fierro to call [employer] customer service for advanced trouble shooting assistance. Mrs. Fierro states she'll call [employer] then call the device clinic tomorrow, mid-morning.

## 2025-02-11 DIAGNOSIS — F33.40 RECURRENT MAJOR DEPRESSIVE DISORDER, IN REMISSION: ICD-10-CM

## 2025-02-11 DIAGNOSIS — Z79.899 MEDICATION MANAGEMENT: ICD-10-CM

## 2025-02-11 RX ORDER — DULOXETIN HYDROCHLORIDE 60 MG/1
CAPSULE, DELAYED RELEASE ORAL
Qty: 60 CAPSULE | Refills: 1 | Status: SHIPPED | OUTPATIENT
Start: 2025-02-11

## 2025-02-11 RX ORDER — TRAZODONE HYDROCHLORIDE 100 MG/1
TABLET ORAL
Qty: 30 TABLET | Refills: 1 | Status: SHIPPED | OUTPATIENT
Start: 2025-02-11

## 2025-02-11 RX ORDER — ALPRAZOLAM 2 MG/1
TABLET, EXTENDED RELEASE ORAL
Qty: 30 TABLET | Refills: 1 | Status: SHIPPED | OUTPATIENT
Start: 2025-02-11

## 2025-02-11 RX ORDER — DOXEPIN HYDROCHLORIDE 50 MG/1
CAPSULE ORAL
Qty: 60 CAPSULE | Refills: 1 | Status: SHIPPED | OUTPATIENT
Start: 2025-02-11

## 2025-02-11 NOTE — TELEPHONE ENCOUNTER
Patient had to reschedule due to illness. Requesting refills to get him through until appointment in March

## 2025-02-12 PROCEDURE — 93294 REM INTERROG EVL PM/LDLS PM: CPT | Performed by: STUDENT IN AN ORGANIZED HEALTH CARE EDUCATION/TRAINING PROGRAM

## 2025-02-12 PROCEDURE — 93296 REM INTERROG EVL PM/IDS: CPT | Performed by: STUDENT IN AN ORGANIZED HEALTH CARE EDUCATION/TRAINING PROGRAM

## 2025-02-28 ENCOUNTER — HOSPITAL ENCOUNTER (OUTPATIENT)
Dept: GENERAL RADIOLOGY | Facility: HOSPITAL | Age: 70
Discharge: HOME | End: 2025-02-28
Payer: MEDICARE

## 2025-02-28 ENCOUNTER — TRANSCRIBE ORDERS (OUTPATIENT)
Dept: ADMINISTRATIVE | Facility: HOSPITAL | Age: 70
End: 2025-02-28
Payer: MEDICARE

## 2025-02-28 DIAGNOSIS — R06.2 WHEEZING: ICD-10-CM

## 2025-02-28 DIAGNOSIS — R06.2 WHEEZING: Primary | ICD-10-CM

## 2025-02-28 PROCEDURE — 71046 X-RAY EXAM CHEST 2 VIEWS: CPT

## 2025-03-18 ENCOUNTER — OFFICE VISIT (OUTPATIENT)
Dept: PSYCHIATRY | Facility: CLINIC | Age: 70
End: 2025-03-18
Payer: MEDICARE

## 2025-03-18 VITALS
HEART RATE: 77 BPM | BODY MASS INDEX: 38.22 KG/M2 | OXYGEN SATURATION: 96 % | SYSTOLIC BLOOD PRESSURE: 132 MMHG | HEIGHT: 70 IN | DIASTOLIC BLOOD PRESSURE: 80 MMHG | WEIGHT: 267 LBS

## 2025-03-18 DIAGNOSIS — Z79.899 MEDICATION MANAGEMENT: ICD-10-CM

## 2025-03-18 DIAGNOSIS — F33.40 RECURRENT MAJOR DEPRESSIVE DISORDER, IN REMISSION: Primary | ICD-10-CM

## 2025-03-18 PROCEDURE — 3079F DIAST BP 80-89 MM HG: CPT | Performed by: PSYCHIATRY & NEUROLOGY

## 2025-03-18 PROCEDURE — 99214 OFFICE O/P EST MOD 30 MIN: CPT | Performed by: PSYCHIATRY & NEUROLOGY

## 2025-03-18 PROCEDURE — 3075F SYST BP GE 130 - 139MM HG: CPT | Performed by: PSYCHIATRY & NEUROLOGY

## 2025-03-18 RX ORDER — TRAZODONE HYDROCHLORIDE 50 MG/1
50 TABLET ORAL NIGHTLY
Qty: 90 TABLET | Refills: 1 | Status: SHIPPED | OUTPATIENT
Start: 2025-03-18

## 2025-03-18 RX ORDER — ALPRAZOLAM 1 MG/1
1 TABLET, EXTENDED RELEASE ORAL EVERY MORNING
Qty: 30 TABLET | Refills: 4 | Status: SHIPPED | OUTPATIENT
Start: 2025-04-02

## 2025-03-18 RX ORDER — DULOXETIN HYDROCHLORIDE 60 MG/1
CAPSULE, DELAYED RELEASE ORAL
Qty: 180 CAPSULE | Refills: 1 | Status: SHIPPED | OUTPATIENT
Start: 2025-03-18

## 2025-03-18 RX ORDER — MECLIZINE HYDROCHLORIDE 25 MG/1
TABLET ORAL
COMMUNITY
Start: 2025-01-20

## 2025-03-18 RX ORDER — POTASSIUM CHLORIDE 1500 MG/1
TABLET, EXTENDED RELEASE ORAL
COMMUNITY
Start: 2025-03-05

## 2025-03-18 RX ORDER — DOXEPIN HYDROCHLORIDE 50 MG/1
CAPSULE ORAL
Qty: 90 CAPSULE | Refills: 1 | Status: SHIPPED | OUTPATIENT
Start: 2025-03-18

## 2025-03-18 NOTE — PROGRESS NOTES
"Patient ID: Cecilio Fierro is a 70 y.o. male    SERVICE TYPE: EVALUATION AND MANAGEMENT (greater than 50% of the time spent for supportive psychotherapy).      /80   Pulse 77   Ht 177.8 cm (70\")   Wt 121 kg (267 lb)   SpO2 96%   BMI 38.31 kg/m²     ALLERGIES:  Morphine, Ondansetron, Levaquin [levofloxacin], Tramadol, and Zofran [ondansetron hcl]    CC/ Focus of the visit: Depression    HPI: Patient reports that he has done fairly well, no recurrent significant episodes of depression and that his interest, activities and disposition are at irrelative norms.  Recent surgery for carpal tunnel syndrome left wrist has impaired his quilting pastime hobby.  He is planning a small garden this spring.    Concerned about his medications, daytime drowsiness and sleep apnea.  He does use a CPAP with supplemental oxygen at nighttime.  Had previously been under the impression he was continuing to have difficulty sleeping but it seems that his daytime naps are a culprit with his nighttime terminal insomnia.  We will cut back on the dosing of medications that have built up based on his complaints of insomnia.  Reducing the dose of the trazodone, doxepin and alprazolam.  Cymbalta dosage remains the same.    No ETOH or indications of Rx misuse or substance abuse.     PFSH: Home life stable, a 2-year-old adopted granddaughter now spends more time with them while her mother, the patient's daughter, works.    Review of Systems  No cardiopulmonary, GI or neurological complaints.      SUPPORTIVE PSYCHOTHERAPY: continuing efforts to promote the therapeutic alliance, address the patient’s issues, and strengthen self awareness, insights, and positive coping skills such as Exercising, listen to music, spending time in nature and utilizing resources.     Mental Status Exam  Appearance:  appropriate  Attitude toward clinician:  cooperative and agreeable   Speech:    Rate:  regular rate and rhythm   Volume: normal  Motor:  no " abnormal movements   Mood:  Good  Affect:  euthymic  Thought Processes:  linear, logical, and goal directed  Thought Content:  Normal   Feeling Hopeless: absent  Suicidal Thoughts or Intent:  absent  Homicidal Thoughts:  absent  Perceptual Disturbance: no perceptual disturbance  Attention and Concentration:  good  Insight and Judgement:  good  Memory:  memory appears to be intact    LABS: No results found for this or any previous visit (from the past week).    MEDICATION ISSUES: Have discussed with the patient the medications Risks, Benefits, and Side effects including potential falls, possible impaired driving and  metabolic adversities among others. No medication side effects or related complaints today.     TREATMENT PLAN/GOALS: Continue supportive psychotherapy efforts and medications as indicated.     Current Outpatient Medications   Medication Sig Dispense Refill    allopurinol (ZYLOPRIM) 300 MG tablet Take 1 tablet by mouth Daily.      aspirin 81 MG EC tablet Take 1 tablet by mouth Daily.      bumetanide (BUMEX) 1 MG tablet Take 2 tablets by mouth Daily.      doxepin (SINEquan) 50 MG capsule TAKE one CAPSULES BY MOUTH ONCE DAILY AT BEDTIME 90 capsule 1    DULoxetine (Cymbalta) 60 MG capsule Take one bid. 180 capsule 1    fexofenadine (ALLEGRA) 180 MG tablet Take 1 tablet by mouth Daily.      fluticasone (FLONASE) 50 MCG/ACT nasal spray 2 sprays Daily.      folic acid (FOLVITE) 1 MG tablet Take 1 tablet by mouth Every Night.      gabapentin (NEURONTIN) 800 MG tablet Take 1 tablet by mouth 3 (Three) Times a Day.      ipratropium-albuterol (DUO-NEB) 0.5-2.5 mg/3 ml nebulizer       meclizine (ANTIVERT) 25 MG tablet       methocarbamol (ROBAXIN) 750 MG tablet Take 1 tablet by mouth Daily As Needed.      metoprolol tartrate (LOPRESSOR) 25 MG tablet Take 1 tablet by mouth 2 (Two) Times a Day. 180 tablet 3    montelukast (SINGULAIR) 10 MG tablet Take 1 tablet by mouth Every Night.      naloxone (NARCAN) 4 MG/0.1ML  nasal spray Call 911. Don't prime. [place] in 1 nostril for overdose. Repeat in 2-3 minutes in other nostril if no or minimal breathing/responsiveness. 2 each 0    nitroglycerin (NITROSTAT) 0.4 MG SL tablet Place 1 tablet under the tongue Every 5 (Five) Minutes As Needed for Chest Pain.      omeprazole (priLOSEC) 40 MG capsule Take 1 capsule by mouth Daily.      potassium chloride (KLOR-CON M20) 20 MEQ CR tablet Take 1 tablet by mouth Daily.      potassium chloride ER (K-TAB) 20 MEQ tablet controlled-release ER tablet       pravastatin (PRAVACHOL) 40 MG tablet Take 1 tablet by mouth Every Night.      promethazine (PHENERGAN) 25 MG tablet Take 1 tablet by mouth As Needed for Nausea or Vomiting.      rOPINIRole (REQUIP) 3 MG tablet Take 1 tablet by mouth 2 (Two) Times a Day. Take 1 hour before bedtime.      tamsulosin (FLOMAX) 0.4 MG capsule 24 hr capsule Take 1 capsule by mouth Daily. 90 capsule 1    tiZANidine (ZANAFLEX) 4 MG tablet Take 1 tablet by mouth Every 6 (Six) Hours As Needed.      Trelegy Ellipta 200-62.5-25 MCG/ACT inhaler       Wixela Inhub 250-50 MCG/DOSE DISKUS Inhale 2 puffs Daily.      [START ON 4/2/2025] ALPRAZolam XR (XANAX XR) 1 MG 24 hr tablet Take 1 tablet by mouth Every Morning. 30 tablet 4    traZODone (DESYREL) 50 MG tablet Take 1 tablet by mouth Every Night. 90 tablet 1     No current facility-administered medications for this visit.       COLLATERAL PSYCHOTHERAPEUTIC INTERVENTION: patient not interested in additional psychotherapy.    RISK:  moderate    Assessment & Plan     Diagnoses and all orders for this visit:    1. Recurrent major depressive disorder, in remission (Primary)  -     doxepin (SINEquan) 50 MG capsule; TAKE one CAPSULES BY MOUTH ONCE DAILY AT BEDTIME  Dispense: 90 capsule; Refill: 1  -     DULoxetine (Cymbalta) 60 MG capsule; Take one bid.  Dispense: 180 capsule; Refill: 1  -     traZODone (DESYREL) 50 MG tablet; Take 1 tablet by mouth Every Night.  Dispense: 90 tablet;  Refill: 1  -     ALPRAZolam XR (XANAX XR) 1 MG 24 hr tablet; Take 1 tablet by mouth Every Morning.  Dispense: 30 tablet; Refill: 4    2. Medication management  -     ALPRAZolam XR (XANAX XR) 1 MG 24 hr tablet; Take 1 tablet by mouth Every Morning.  Dispense: 30 tablet; Refill: 4        Return in about 6 months (around 9/18/2025).           Patient knows to call if symptoms worsen or fail to improve between appointments.    I spent 30 minutes caring for Cecilio on this date of service. This time includes time spent by me in the following activities: Patient evaluation, support psychotherapy, decisions, medications, and documentation.     Dictated utilizing Functional Neuromodulation dictation    RICARDO Almonte MD

## 2025-04-02 ENCOUNTER — OFFICE VISIT (OUTPATIENT)
Dept: UROLOGY | Facility: CLINIC | Age: 70
End: 2025-04-02
Payer: MEDICARE

## 2025-04-02 DIAGNOSIS — Z85.46 HISTORY OF PROSTATE CANCER: Primary | ICD-10-CM

## 2025-04-02 DIAGNOSIS — N13.8 BPH WITH OBSTRUCTION/LOWER URINARY TRACT SYMPTOMS: ICD-10-CM

## 2025-04-02 DIAGNOSIS — R39.9 LOWER URINARY TRACT SYMPTOMS (LUTS): ICD-10-CM

## 2025-04-02 DIAGNOSIS — N40.1 BPH WITH OBSTRUCTION/LOWER URINARY TRACT SYMPTOMS: ICD-10-CM

## 2025-04-02 RX ORDER — TAMSULOSIN HYDROCHLORIDE 0.4 MG/1
1 CAPSULE ORAL DAILY
Qty: 90 CAPSULE | Refills: 3 | Status: SHIPPED | OUTPATIENT
Start: 2025-04-02

## 2025-04-02 NOTE — PROGRESS NOTES
Follow Up Office Visit      Patient Name: Cecilio Fierro  : 1955   MRN: [id number]     Chief Complaint:    Chief Complaint   Patient presents with    Prostate Cancer       Referring Provider: No ref. provider found    History of Present Illness: Cecilio Fierro is a 70 y.o. male who presents today for follow up of high-volume grade group 2 prostate cancer status post CyberKnife radiation completed 2024, history of BPH with progressive LUTS status post UroLift procedure performed in 2024.  He is here today for 6-month follow-up.  Patient is doing well.  He denies significant urinary complaints.  IPSS score is 13 with a pleased quality of life.  He continues taking tamsulosin per his preference.  He is mainly concerned about recent wrist surgery with hand pain.  His PSA has declined precipitously after radiation 1 year ago. Pca is in remission.     PSA is 1.1 per outside [facility] records, PSA drawn 3/27/25.     Pre-radiation PSA was >7.       Subjective      Review of System: Review of Systems   Genitourinary:  Positive for frequency and urgency.      I have reviewed the ROS documented by my clinical staff, I have updated appropriately and I agree. Malcolm Barber MD    I have reviewed and the following portions of the patient's history were updated as appropriate: past family history, past medical history, past social history, past surgical history and problem list.    Medications:     Current Outpatient Medications:     allopurinol (ZYLOPRIM) 300 MG tablet, Take 1 tablet by mouth Daily., Disp: , Rfl:     ALPRAZolam XR (XANAX XR) 1 MG 24 hr tablet, Take 1 tablet by mouth Every Morning., Disp: 30 tablet, Rfl: 4    aspirin 81 MG EC tablet, Take 1 tablet by mouth Daily., Disp: , Rfl:     bumetanide (BUMEX) 1 MG tablet, Take 2 tablets by mouth Daily., Disp: , Rfl:     doxepin (SINEquan) 50 MG capsule, TAKE one CAPSULES BY MOUTH ONCE DAILY AT BEDTIME, Disp: 90 capsule, Rfl: 1    DULoxetine  (Cymbalta) 60 MG capsule, Take one bid., Disp: 180 capsule, Rfl: 1    fexofenadine (ALLEGRA) 180 MG tablet, Take 1 tablet by mouth Daily., Disp: , Rfl:     fluticasone (FLONASE) 50 MCG/ACT nasal spray, 2 sprays Daily., Disp: , Rfl:     folic acid (FOLVITE) 1 MG tablet, Take 1 tablet by mouth Every Night., Disp: , Rfl:     gabapentin (NEURONTIN) 800 MG tablet, Take 1 tablet by mouth 3 (Three) Times a Day., Disp: , Rfl:     ipratropium-albuterol (DUO-NEB) 0.5-2.5 mg/3 ml nebulizer, , Disp: , Rfl:     meclizine (ANTIVERT) 25 MG tablet, , Disp: , Rfl:     methocarbamol (ROBAXIN) 750 MG tablet, Take 1 tablet by mouth Daily As Needed., Disp: , Rfl:     metoprolol tartrate (LOPRESSOR) 25 MG tablet, Take 1 tablet by mouth 2 (Two) Times a Day., Disp: 180 tablet, Rfl: 3    montelukast (SINGULAIR) 10 MG tablet, Take 1 tablet by mouth Every Night., Disp: , Rfl:     naloxone (NARCAN) 4 MG/0.1ML nasal spray, Call 911. Don't prime. [place] in 1 nostril for overdose. Repeat in 2-3 minutes in other nostril if no or minimal breathing/responsiveness., Disp: 2 each, Rfl: 0    nitroglycerin (NITROSTAT) 0.4 MG SL tablet, Place 1 tablet under the tongue Every 5 (Five) Minutes As Needed for Chest Pain., Disp: , Rfl:     omeprazole (priLOSEC) 40 MG capsule, Take 1 capsule by mouth Daily., Disp: , Rfl:     potassium chloride (KLOR-CON M20) 20 MEQ CR tablet, Take 1 tablet by mouth Daily., Disp: , Rfl:     potassium chloride ER (K-TAB) 20 MEQ tablet controlled-release ER tablet, , Disp: , Rfl:     pravastatin (PRAVACHOL) 40 MG tablet, Take 1 tablet by mouth Every Night., Disp: , Rfl:     promethazine (PHENERGAN) 25 MG tablet, Take 1 tablet by mouth As Needed for Nausea or Vomiting., Disp: , Rfl:     rOPINIRole (REQUIP) 3 MG tablet, Take 1 tablet by mouth 2 (Two) Times a Day. Take 1 hour before bedtime., Disp: , Rfl:     tamsulosin (FLOMAX) 0.4 MG capsule 24 hr capsule, Take 1 capsule by mouth Daily., Disp: 90 capsule, Rfl: 3    tiZANidine  (ZANAFLEX) 4 MG tablet, Take 1 tablet by mouth Every 6 (Six) Hours As Needed., Disp: , Rfl:     traZODone (DESYREL) 50 MG tablet, Take 1 tablet by mouth Every Night., Disp: 90 tablet, Rfl: 1    Trelegy Ellipta 200-62.5-25 MCG/ACT inhaler, , Disp: , Rfl:     Wixela Inhub 250-50 MCG/DOSE DISKUS, Inhale 2 puffs Daily., Disp: , Rfl:     Allergies:   Allergies   Allergen Reactions    Morphine Itching, Other (See Comments), Unknown (See Comments) and Unknown - Low Severity     urinary retention    urinary retention   urinary retention    Other reaction(s): Unknown   urinary retention   urinary retention   urinary retention   urinary retention    Ondansetron Itching, Unknown (See Comments), Unknown - Low Severity and Rash     Caused nausea and itching.     Caused nausea and itching.    Caused nausea and itching.    Levaquin [Levofloxacin] Hives    Tramadol Rash    Zofran [Ondansetron Hcl] Other (See Comments)     Caused nausea and itching.        IPSS Questionnaire (AUA-7):  Over the past month…    1)  Incomplete Emptying:       How often have you had a sensation of not emptying you had the sensation of not emptying your bladder completely after you finished urinating?  3 - About half the time   2)  Frequency:       How often have you had the urinate again less than two hours after you finished urinating?  5 - Almost always   3)  Intermittency:       How often have you found you stopped and started again several times when you urinated?   1 - Less than 1 time in 5   4) Urgency:      How often have you found it difficult to postpone urination?  4 - More than half the time   5) Weak Stream:      How often have you had a weak urinary stream?  0 - Not at all   6) Straining:       How often have you had to push or strain to begin urination?  0 - Not at all   7) Nocturia:      How many times did you most typically get up to urinate from the time you went to bed at night until the time you got up in the morning?  0 - None   Total  Score:  13   The International Prostate Symptom Score (IPSS) is used to screen, diagnose, track symptoms of benign prostatic hyperplasia (BPH).   0-7 (Mild Symptoms) 8-19 (Moderate) 20-35 (Severe)   Quality of Life (QoL):  If you were to spend the rest of your life with your urinary condition just the way it is now, how would you feel about that? 1-Pleased   Urine Leakage (Incontinence) 0-No Leakage         Objective     Physical Exam:   Vital Signs: There were no vitals filed for this visit.  There is no height or weight on file to calculate BMI.     Physical Exam  Constitutional:       Appearance: Normal appearance.   HENT:      Head: Normocephalic and atraumatic.      Nose: Nose normal.   Eyes:      Extraocular Movements: Extraocular movements intact.      Conjunctiva/sclera: Conjunctivae normal.      Pupils: Pupils are equal, round, and reactive to light.   Musculoskeletal:         General: Normal range of motion.      Cervical back: Normal range of motion and neck supple.   Skin:     General: Skin is warm and dry.      Findings: No lesion or rash.   Neurological:      General: No focal deficit present.      Mental Status: He is alert and oriented to person, place, and time. Mental status is at baseline.   Psychiatric:         Mood and Affect: Mood normal.         Behavior: Behavior normal.         Labs:   Brief Urine Lab Results  (Last result in the past 365 days)        Color   Clarity   Blood   Leuk Est   Nitrite   Protein   CREAT   Urine HCG        06/20/24 1251 Yellow   Clear   Negative   Negative   Negative   Negative                        Lab Results   Component Value Date    GLUCOSE 118 (H) 01/02/2024    CALCIUM 9.6 01/02/2024     01/02/2024    K 4.3 01/26/2024    CO2 27.0 01/02/2024     01/02/2024    BUN 19 01/02/2024    CREATININE 1.70 (H) 04/04/2024    EGFRIFNONA 68 04/18/2018    BCR 14.4 01/02/2024    ANIONGAP 12.0 01/02/2024       Lab Results   Component Value Date    WBC 10.18  01/02/2024    HGB 16.0 01/02/2024    HCT 47.2 01/02/2024    MCV 95.4 01/02/2024     01/02/2024       Images:   XR Chest 2 View  Result Date: 2/28/2025    Stable exam with cardiomegaly noted. No acute changes identified.  This report was finalized on 2/28/2025 10:29 AM by Dr. Jt Becerra MD.        Measures:   Tobacco:   Cecilio Fierro  reports that he has been smoking cigarettes. He has a 26 pack-year smoking history. He has been exposed to tobacco smoke. He has quit using smokeless tobacco.  His smokeless tobacco use included chew. I have educated him on the risk of diseases from using tobacco products such as cancer, COPD, and heart disease.     I advised him to quit and he is not willing to quit.    I spent 3  minutes counseling the patient.            Assessment / Plan      Assessment/Plan:   70 y.o. male who presented today for follow up of grade group 2 prostate cancer status post CyberKnife radiation completed April 2024.  PSA is 1.1.  Continue declining.  Will recheck a PSA in 1 year.  Status post UroLift more than a year ago for progressive BPH with symptoms, symptoms are stable and he desires to continue tamsulosin given stability.    Diagnoses and all orders for this visit:    1. History of prostate cancer (Primary)  -     PSA Diagnostic; Future    2. BPH with obstruction/lower urinary tract symptoms  -     tamsulosin (FLOMAX) 0.4 MG capsule 24 hr capsule; Take 1 capsule by mouth Daily.  Dispense: 90 capsule; Refill: 3    3. Lower urinary tract symptoms (LUTS)  -     tamsulosin (FLOMAX) 0.4 MG capsule 24 hr capsule; Take 1 capsule by mouth Daily.  Dispense: 90 capsule; Refill: 3    Other orders  -     LABS SCANNED  -     LABS SCANNED           Follow Up:   Return in about 1 year (around 4/2/2026) for Follow Up after Labs.    I spent approximately 30 minutes providing clinical care for this patient; including review of patient's chart and provider documentation, face to face time spent with  patient in examination room (obtaining history, performing physical exam, discussing diagnosis and management options), placing orders, and completing patient documentation.     Malcolm Barber MD  [facility] [place]

## 2025-04-07 ENCOUNTER — PRIOR AUTHORIZATION (OUTPATIENT)
Dept: PSYCHIATRY | Facility: CLINIC | Age: 70
End: 2025-04-07
Payer: MEDICARE

## 2025-05-06 ENCOUNTER — TRANSCRIBE ORDERS (OUTPATIENT)
Dept: ADMINISTRATIVE | Facility: HOSPITAL | Age: 70
End: 2025-05-06
Payer: MEDICARE

## 2025-05-06 DIAGNOSIS — Z87.891 PERSONAL HISTORY OF TOBACCO USE, PRESENTING HAZARDS TO HEALTH: Primary | ICD-10-CM

## 2025-05-21 ENCOUNTER — HOSPITAL ENCOUNTER (OUTPATIENT)
Facility: HOSPITAL | Age: 70
Discharge: HOME | End: 2025-05-21
Admitting: FAMILY MEDICINE
Payer: MEDICARE

## 2025-05-21 DIAGNOSIS — Z87.891 PERSONAL HISTORY OF TOBACCO USE, PRESENTING HAZARDS TO HEALTH: ICD-10-CM

## 2025-05-21 PROCEDURE — 71271 CT THORAX LUNG CANCER SCR C-: CPT

## 2025-07-21 ENCOUNTER — TRANSCRIBE ORDERS (OUTPATIENT)
Dept: LAB | Facility: HOSPITAL | Age: 70
End: 2025-07-21
Payer: MEDICARE

## 2025-07-21 ENCOUNTER — HOSPITAL ENCOUNTER (OUTPATIENT)
Facility: HOSPITAL | Age: 70
Discharge: HOME | End: 2025-07-21
Admitting: FAMILY MEDICINE
Payer: MEDICARE

## 2025-07-21 DIAGNOSIS — M79.642 LEFT HAND PAIN: Primary | ICD-10-CM

## 2025-07-21 PROCEDURE — 73130 X-RAY EXAM OF HAND: CPT

## 2025-07-21 PROCEDURE — 73130 X-RAY EXAM OF HAND: CPT | Performed by: RADIOLOGY

## 2025-08-14 LAB
MDC_IDC_MSMT_BATTERY_REMAINING_LONGEVITY: 35 MO
MDC_IDC_MSMT_BATTERY_REMAINING_PERCENTAGE: 31 %
MDC_IDC_MSMT_BATTERY_RRT_TRIGGER: 2.6
MDC_IDC_MSMT_BATTERY_STATUS: NORMAL
MDC_IDC_MSMT_BATTERY_VOLTAGE: 2.95
MDC_IDC_MSMT_LEADCHNL_RA_DTM: NORMAL
MDC_IDC_MSMT_LEADCHNL_RA_IMPEDANCE_VALUE: 400
MDC_IDC_MSMT_LEADCHNL_RA_PACING_THRESHOLD_AMPLITUDE: 0.88
MDC_IDC_MSMT_LEADCHNL_RA_PACING_THRESHOLD_POLARITY: NORMAL
MDC_IDC_MSMT_LEADCHNL_RA_PACING_THRESHOLD_PULSEWIDTH: 0.4
MDC_IDC_MSMT_LEADCHNL_RA_SENSING_INTR_AMPL: 5
MDC_IDC_MSMT_LEADCHNL_RV_IMPEDANCE_VALUE: 450
MDC_IDC_MSMT_LEADCHNL_RV_PACING_THRESHOLD_POLARITY: NORMAL
MDC_IDC_MSMT_LEADCHNL_RV_SENSING_INTR_AMPL: 12
MDC_IDC_PG_IMPLANT_DTM: NORMAL
MDC_IDC_PG_MFG: NORMAL
MDC_IDC_PG_MODEL: NORMAL
MDC_IDC_PG_SERIAL: NORMAL
MDC_IDC_PG_TYPE: NORMAL
MDC_IDC_SESS_DTM: NORMAL
MDC_IDC_SESS_TYPE: NORMAL
MDC_IDC_SET_BRADY_AT_MODE_SWITCH_RATE: 160
MDC_IDC_SET_BRADY_LOWRATE: 70
MDC_IDC_SET_BRADY_MAX_SENSOR_RATE: 125
MDC_IDC_SET_BRADY_MAX_TRACKING_RATE: 130
MDC_IDC_SET_BRADY_MODE: NORMAL
MDC_IDC_SET_BRADY_PAV_DELAY: 275
MDC_IDC_SET_BRADY_SAV_DELAY: 250
MDC_IDC_SET_LEADCHNL_RA_PACING_AMPLITUDE: 2
MDC_IDC_SET_LEADCHNL_RA_PACING_POLARITY: NORMAL
MDC_IDC_SET_LEADCHNL_RA_PACING_PULSEWIDTH: 0.4
MDC_IDC_SET_LEADCHNL_RA_SENSING_POLARITY: NORMAL
MDC_IDC_SET_LEADCHNL_RA_SENSING_SENSITIVITY: 0.5
MDC_IDC_SET_LEADCHNL_RV_PACING_AMPLITUDE: 2
MDC_IDC_SET_LEADCHNL_RV_PACING_POLARITY: NORMAL
MDC_IDC_SET_LEADCHNL_RV_PACING_PULSEWIDTH: 0.4
MDC_IDC_SET_LEADCHNL_RV_SENSING_POLARITY: NORMAL
MDC_IDC_SET_LEADCHNL_RV_SENSING_SENSITIVITY: 2
MDC_IDC_STAT_AT_BURDEN_PERCENT: 1
MDC_IDC_STAT_BRADY_RA_PERCENT_PACED: 68
MDC_IDC_STAT_BRADY_RV_PERCENT_PACED: 1

## (undated) DEVICE — ADULT, W/LG. BACK PAD, RADIOTRANSPARENT ELEMENT AND LEAD WIRE: Brand: DEFIBRILLATION ELECTRODES

## (undated) DEVICE — SET PRIMARY GRVTY 10DP MALE LL 104IN

## (undated) DEVICE — PK CYSTO-TUR BASIC 10

## (undated) DEVICE — 3M™ STERI-STRIP™ REINFORCED ADHESIVE SKIN CLOSURES, R1547, 1/2 IN X 4 IN (12 MM X 100 MM), 6 STRIPS/ENVELOPE: Brand: 3M™ STERI-STRIP™

## (undated) DEVICE — SOL NACL 0.9PCT 1000ML

## (undated) DEVICE — ST INF PRI SMRTSTE 20DRP 2VLV 24ML 117

## (undated) DEVICE — DECANTER: Brand: UNBRANDED

## (undated) DEVICE — LIMB HOLDERS: Brand: DEROYAL

## (undated) DEVICE — MEDI-VAC YANKAUER SUCTION HANDLE W/BULBOUS TIP: Brand: CARDINAL HEALTH

## (undated) DEVICE — CANN NASL CO2 DIVIDED A/

## (undated) DEVICE — ST EXT IV SMARTSITE 2VLV SP M LL 5ML IV1

## (undated) DEVICE — GLV SURG BIOGEL LTX PF 8

## (undated) DEVICE — IRRIGATOR BULB ASEPTO 60CC STRL

## (undated) DEVICE — GOWN SURG SIRUS NONREINF W/SLV 2XL STRL

## (undated) DEVICE — CAUTERY TIP POLISHER: Brand: DEVON

## (undated) DEVICE — LEX ELECTRO PHYSIOLOGY: Brand: MEDLINE INDUSTRIES, INC.

## (undated) DEVICE — IRRIGATOR TOOMEY 70CC

## (undated) DEVICE — TUBING, SUCTION, 1/4" X 10', STRAIGHT: Brand: MEDLINE

## (undated) DEVICE — PENCL E/S HNDSWCH ROCKRBTN HOLSTR 10FT

## (undated) DEVICE — DRSNG SURESITE123 4X4.8IN